# Patient Record
Sex: FEMALE | Race: WHITE | Employment: FULL TIME | URBAN - NONMETROPOLITAN AREA
[De-identification: names, ages, dates, MRNs, and addresses within clinical notes are randomized per-mention and may not be internally consistent; named-entity substitution may affect disease eponyms.]

---

## 2017-01-13 ENCOUNTER — OFFICE VISIT (OUTPATIENT)
Dept: FAMILY MEDICINE CLINIC | Age: 47
End: 2017-01-13

## 2017-01-13 VITALS
TEMPERATURE: 99.4 F | HEIGHT: 69 IN | SYSTOLIC BLOOD PRESSURE: 102 MMHG | OXYGEN SATURATION: 97 % | WEIGHT: 196.2 LBS | DIASTOLIC BLOOD PRESSURE: 70 MMHG | HEART RATE: 80 BPM | BODY MASS INDEX: 29.06 KG/M2

## 2017-01-13 DIAGNOSIS — K21.9 GASTROESOPHAGEAL REFLUX DISEASE, ESOPHAGITIS PRESENCE NOT SPECIFIED: ICD-10-CM

## 2017-01-13 DIAGNOSIS — K27.9 PEPTIC ULCER DISEASE: Primary | ICD-10-CM

## 2017-01-13 PROCEDURE — 99213 OFFICE O/P EST LOW 20 MIN: CPT | Performed by: FAMILY MEDICINE

## 2017-01-13 RX ORDER — LANSOPRAZOLE 30 MG/1
30 CAPSULE, DELAYED RELEASE ORAL DAILY
Qty: 30 CAPSULE | Refills: 1 | Status: SHIPPED | OUTPATIENT
Start: 2017-01-13 | End: 2019-02-04

## 2017-01-13 ASSESSMENT — ENCOUNTER SYMPTOMS
DIARRHEA: 0
ABDOMINAL PAIN: 0
CHOKING: 0
SORE THROAT: 0
NAUSEA: 1
COUGH: 1
CONSTIPATION: 0
HEARTBURN: 1

## 2017-01-16 ENCOUNTER — OFFICE VISIT (OUTPATIENT)
Dept: FAMILY MEDICINE CLINIC | Age: 47
End: 2017-01-16

## 2017-01-16 VITALS
BODY MASS INDEX: 29.03 KG/M2 | WEIGHT: 196 LBS | DIASTOLIC BLOOD PRESSURE: 60 MMHG | SYSTOLIC BLOOD PRESSURE: 102 MMHG | HEIGHT: 69 IN | HEART RATE: 60 BPM | RESPIRATION RATE: 18 BRPM

## 2017-01-16 DIAGNOSIS — F43.9 STRESS: ICD-10-CM

## 2017-01-16 DIAGNOSIS — Z12.39 SCREENING FOR BREAST CANCER: Primary | ICD-10-CM

## 2017-01-16 DIAGNOSIS — F41.9 ANXIETY: ICD-10-CM

## 2017-01-16 DIAGNOSIS — Z13.9 SCREENING: ICD-10-CM

## 2017-01-16 PROCEDURE — 99213 OFFICE O/P EST LOW 20 MIN: CPT | Performed by: PHYSICIAN ASSISTANT

## 2017-01-16 RX ORDER — ALPRAZOLAM 0.25 MG/1
0.25 TABLET ORAL 3 TIMES DAILY PRN
Qty: 30 TABLET | Refills: 0 | Status: SHIPPED | OUTPATIENT
Start: 2017-01-16 | End: 2019-02-04

## 2017-01-16 RX ORDER — CITALOPRAM 10 MG/1
10 TABLET ORAL DAILY
Qty: 30 TABLET | Refills: 0 | Status: SHIPPED | OUTPATIENT
Start: 2017-01-16 | End: 2017-07-12

## 2017-01-16 ASSESSMENT — PATIENT HEALTH QUESTIONNAIRE - PHQ9
SUM OF ALL RESPONSES TO PHQ QUESTIONS 1-9: 0
2. FEELING DOWN, DEPRESSED OR HOPELESS: 0
1. LITTLE INTEREST OR PLEASURE IN DOING THINGS: 0
SUM OF ALL RESPONSES TO PHQ9 QUESTIONS 1 & 2: 0

## 2017-01-16 ASSESSMENT — ENCOUNTER SYMPTOMS
NAUSEA: 0
DIARRHEA: 0
VOMITING: 0
RESPIRATORY NEGATIVE: 1

## 2017-01-23 DIAGNOSIS — E78.00 ELEVATED CHOLESTEROL: Primary | ICD-10-CM

## 2017-01-23 DIAGNOSIS — Z13.9 SCREENING: ICD-10-CM

## 2017-01-30 ENCOUNTER — OFFICE VISIT (OUTPATIENT)
Dept: FAMILY MEDICINE CLINIC | Age: 47
End: 2017-01-30

## 2017-01-30 VITALS
HEIGHT: 69 IN | HEART RATE: 76 BPM | DIASTOLIC BLOOD PRESSURE: 70 MMHG | OXYGEN SATURATION: 99 % | SYSTOLIC BLOOD PRESSURE: 118 MMHG | BODY MASS INDEX: 28.88 KG/M2 | WEIGHT: 195 LBS

## 2017-01-30 DIAGNOSIS — R53.83 FATIGUE, UNSPECIFIED TYPE: Primary | ICD-10-CM

## 2017-01-30 DIAGNOSIS — E55.9 VITAMIN D DEFICIENCY: ICD-10-CM

## 2017-01-30 DIAGNOSIS — E78.2 MIXED HYPERLIPIDEMIA: ICD-10-CM

## 2017-01-30 PROCEDURE — 99213 OFFICE O/P EST LOW 20 MIN: CPT | Performed by: PHYSICIAN ASSISTANT

## 2017-01-30 ASSESSMENT — ENCOUNTER SYMPTOMS
HEARTBURN: 1
NAUSEA: 0

## 2017-01-31 ENCOUNTER — OFFICE VISIT (OUTPATIENT)
Dept: OPTOMETRY | Age: 47
End: 2017-01-31

## 2017-01-31 DIAGNOSIS — H52.203 HYPEROPIA OF BOTH EYES WITH ASTIGMATISM AND PRESBYOPIA: Primary | ICD-10-CM

## 2017-01-31 DIAGNOSIS — H52.03 HYPEROPIA OF BOTH EYES WITH ASTIGMATISM AND PRESBYOPIA: Primary | ICD-10-CM

## 2017-01-31 DIAGNOSIS — H52.4 HYPEROPIA OF BOTH EYES WITH ASTIGMATISM AND PRESBYOPIA: Primary | ICD-10-CM

## 2017-01-31 PROCEDURE — 92004 COMPRE OPH EXAM NEW PT 1/>: CPT | Performed by: OPTOMETRIST

## 2017-01-31 PROCEDURE — 92015 DETERMINE REFRACTIVE STATE: CPT | Performed by: OPTOMETRIST

## 2017-01-31 RX ORDER — BENOXINATE HCL/FLUORESCEIN SOD 0.4%-0.25%
1 DROPS OPHTHALMIC (EYE) ONCE
Status: COMPLETED | OUTPATIENT
Start: 2017-01-31 | End: 2017-01-31

## 2017-01-31 RX ADMIN — Medication 1 DROP: at 13:34

## 2017-01-31 ASSESSMENT — REFRACTION_MANIFEST
OD_CYLINDER: -0.50
OD_AXIS: 055
OS_ADD: +1.00
OS_SPHERE: +0.25
OS_AXIS: 180
OD_SPHERE: +0.50
OS_CYLINDER: -1.00
OD_ADD: +1.00

## 2017-01-31 ASSESSMENT — SLIT LAMP EXAM - LIDS
COMMENTS: NORMAL
COMMENTS: NORMAL

## 2017-01-31 ASSESSMENT — TONOMETRY
OD_IOP_MMHG: 14
OS_IOP_MMHG: 14
IOP_METHOD: APPLANATION W FLURESS DROP

## 2017-01-31 ASSESSMENT — VISUAL ACUITY
OS_SC: 20/25
METHOD: SNELLEN - LINEAR
OD_SC: 20/2

## 2017-02-05 ASSESSMENT — ENCOUNTER SYMPTOMS
DIARRHEA: 0
TROUBLE SWALLOWING: 0
COUGH: 0
CHEST TIGHTNESS: 0
VOMITING: 0
SORE THROAT: 0
SHORTNESS OF BREATH: 0
WHEEZING: 0

## 2017-04-11 ENCOUNTER — HOSPITAL ENCOUNTER (OUTPATIENT)
Age: 47
Setting detail: SPECIMEN
Discharge: HOME | End: 2017-04-11
Payer: COMMERCIAL

## 2017-04-11 ENCOUNTER — OFFICE VISIT (OUTPATIENT)
Dept: OPTOMETRY | Age: 47
End: 2017-04-11
Payer: COMMERCIAL

## 2017-04-11 DIAGNOSIS — M35.01 KERATITIS SICCA, BILATERAL (HCC): Primary | ICD-10-CM

## 2017-04-11 LAB — VITAMIN D 25-HYDROXY: 25.1 NG/ML (ref 30–100)

## 2017-04-11 PROCEDURE — 99213 OFFICE O/P EST LOW 20 MIN: CPT | Performed by: OPTOMETRIST

## 2017-04-11 ASSESSMENT — VISUAL ACUITY
METHOD: SNELLEN - LINEAR
OS_SC: 20/25
OD_SC: 20/25

## 2017-04-11 ASSESSMENT — SLIT LAMP EXAM - LIDS: COMMENTS: SAME

## 2017-04-12 DIAGNOSIS — R79.89 LOW VITAMIN D LEVEL: Primary | ICD-10-CM

## 2017-04-17 DIAGNOSIS — R92.8 ABNORMAL MAMMOGRAM: Primary | ICD-10-CM

## 2017-04-18 ENCOUNTER — APPOINTMENT (OUTPATIENT)
Dept: CT IMAGING | Age: 47
End: 2017-04-18
Payer: COMMERCIAL

## 2017-04-18 ENCOUNTER — APPOINTMENT (OUTPATIENT)
Dept: GENERAL RADIOLOGY | Age: 47
End: 2017-04-18
Payer: COMMERCIAL

## 2017-04-18 ENCOUNTER — OFFICE VISIT (OUTPATIENT)
Dept: PRIMARY CARE CLINIC | Age: 47
End: 2017-04-18

## 2017-04-18 ENCOUNTER — HOSPITAL ENCOUNTER (EMERGENCY)
Age: 47
Discharge: HOME | End: 2017-04-18
Attending: EMERGENCY MEDICINE
Payer: COMMERCIAL

## 2017-04-18 VITALS
RESPIRATION RATE: 14 BRPM | SYSTOLIC BLOOD PRESSURE: 117 MMHG | BODY MASS INDEX: 29.18 KG/M2 | WEIGHT: 197 LBS | OXYGEN SATURATION: 98 % | DIASTOLIC BLOOD PRESSURE: 76 MMHG | HEART RATE: 57 BPM | HEIGHT: 69 IN

## 2017-04-18 VITALS
RESPIRATION RATE: 14 BRPM | HEART RATE: 75 BPM | TEMPERATURE: 97.6 F | SYSTOLIC BLOOD PRESSURE: 110 MMHG | OXYGEN SATURATION: 95 % | BODY MASS INDEX: 29.24 KG/M2 | DIASTOLIC BLOOD PRESSURE: 70 MMHG | WEIGHT: 197.4 LBS | HEIGHT: 69 IN

## 2017-04-18 DIAGNOSIS — M54.12 CERVICAL RADICULAR PAIN: Primary | ICD-10-CM

## 2017-04-18 DIAGNOSIS — R20.2 NUMBNESS AND TINGLING IN LEFT ARM: ICD-10-CM

## 2017-04-18 DIAGNOSIS — R20.0 NUMBNESS AND TINGLING IN LEFT ARM: ICD-10-CM

## 2017-04-18 DIAGNOSIS — M25.512 ACUTE PAIN OF LEFT SHOULDER: ICD-10-CM

## 2017-04-18 DIAGNOSIS — M79.602 LEFT ARM PAIN: Primary | ICD-10-CM

## 2017-04-18 DIAGNOSIS — G44.209 ACUTE NON INTRACTABLE TENSION-TYPE HEADACHE: ICD-10-CM

## 2017-04-18 DIAGNOSIS — R11.11 VOMITING WITHOUT NAUSEA, INTRACTABILITY OF VOMITING NOT SPECIFIED, UNSPECIFIED VOMITING TYPE: ICD-10-CM

## 2017-04-18 LAB
ABSOLUTE EOS #: 0.1 K/UL (ref 0–0.4)
ABSOLUTE LYMPH #: 2.4 K/UL (ref 1–4.8)
ABSOLUTE MONO #: 0.4 K/UL (ref 0.1–1.2)
ALBUMIN SERPL-MCNC: 4 G/DL (ref 3.5–5.2)
ALBUMIN/GLOBULIN RATIO: 1.4 (ref 1–2.5)
ALP BLD-CCNC: 65 U/L (ref 35–104)
ALT SERPL-CCNC: 9 U/L (ref 5–33)
ANION GAP SERPL CALCULATED.3IONS-SCNC: 11 MMOL/L (ref 9–17)
AST SERPL-CCNC: 15 U/L
BASOPHILS # BLD: 1 % (ref 0–2)
BASOPHILS ABSOLUTE: 0 K/UL (ref 0–0.2)
BILIRUB SERPL-MCNC: 0.65 MG/DL (ref 0.3–1.2)
BUN BLDV-MCNC: 12 MG/DL (ref 6–20)
BUN/CREAT BLD: 25 (ref 9–20)
CALCIUM SERPL-MCNC: 9.1 MG/DL (ref 8.6–10.4)
CHLORIDE BLD-SCNC: 102 MMOL/L (ref 98–107)
CO2: 26 MMOL/L (ref 20–31)
CREAT SERPL-MCNC: 0.48 MG/DL (ref 0.5–0.9)
DIFFERENTIAL TYPE: NORMAL
EOSINOPHILS RELATIVE PERCENT: 1 % (ref 1–4)
GFR AFRICAN AMERICAN: >60 ML/MIN
GFR NON-AFRICAN AMERICAN: >60 ML/MIN
GFR SERPL CREATININE-BSD FRML MDRD: ABNORMAL ML/MIN/{1.73_M2}
GFR SERPL CREATININE-BSD FRML MDRD: ABNORMAL ML/MIN/{1.73_M2}
GLUCOSE BLD-MCNC: 125 MG/DL (ref 70–99)
HCT VFR BLD CALC: 39.2 % (ref 36–46)
HEMOGLOBIN: 13 G/DL (ref 12–16)
LYMPHOCYTES # BLD: 29 % (ref 24–44)
MAGNESIUM: 2 MG/DL (ref 1.6–2.6)
MCH RBC QN AUTO: 31.4 PG (ref 26–34)
MCHC RBC AUTO-ENTMCNC: 33.2 G/DL (ref 31–37)
MCV RBC AUTO: 94.5 FL (ref 80–100)
MONOCYTES # BLD: 5 % (ref 1–7)
PDW BLD-RTO: 13.9 % (ref 11–14.5)
PLATELET # BLD: 216 K/UL (ref 140–450)
PLATELET ESTIMATE: NORMAL
PMV BLD AUTO: 9.4 FL (ref 6–12)
POTASSIUM SERPL-SCNC: 3.8 MMOL/L (ref 3.7–5.3)
RBC # BLD: 4.15 M/UL (ref 4–5.2)
RBC # BLD: NORMAL 10*6/UL
SEG NEUTROPHILS: 64 % (ref 36–66)
SEGMENTED NEUTROPHILS ABSOLUTE COUNT: 5.3 K/UL (ref 1.8–7.7)
SODIUM BLD-SCNC: 139 MMOL/L (ref 135–144)
TOTAL PROTEIN: 6.8 G/DL (ref 6.4–8.3)
TROPONIN INTERP: NORMAL
TROPONIN T: <0.03 NG/ML
WBC # BLD: 8.2 K/UL (ref 3.5–11)
WBC # BLD: NORMAL 10*3/UL

## 2017-04-18 PROCEDURE — 72125 CT NECK SPINE W/O DYE: CPT | Performed by: RADIOLOGY

## 2017-04-18 PROCEDURE — 99284 EMERGENCY DEPT VISIT MOD MDM: CPT

## 2017-04-18 PROCEDURE — 71020 XR CHEST STANDARD TWO VW: CPT | Performed by: RADIOLOGY

## 2017-04-18 PROCEDURE — 70450 CT HEAD/BRAIN W/O DYE: CPT

## 2017-04-18 PROCEDURE — 72125 CT NECK SPINE W/O DYE: CPT

## 2017-04-18 PROCEDURE — 73030 X-RAY EXAM OF SHOULDER: CPT | Performed by: RADIOLOGY

## 2017-04-18 PROCEDURE — 70450 CT HEAD/BRAIN W/O DYE: CPT | Performed by: RADIOLOGY

## 2017-04-18 PROCEDURE — 71020 XR CHEST STANDARD TWO VW: CPT

## 2017-04-18 PROCEDURE — 85025 COMPLETE CBC W/AUTO DIFF WBC: CPT

## 2017-04-18 PROCEDURE — 83735 ASSAY OF MAGNESIUM: CPT

## 2017-04-18 PROCEDURE — 84484 ASSAY OF TROPONIN QUANT: CPT

## 2017-04-18 PROCEDURE — 99999 PR OFFICE/OUTPT VISIT,PROCEDURE ONLY: CPT | Performed by: FAMILY MEDICINE

## 2017-04-18 PROCEDURE — 36415 COLL VENOUS BLD VENIPUNCTURE: CPT

## 2017-04-18 PROCEDURE — 80053 COMPREHEN METABOLIC PANEL: CPT

## 2017-04-18 PROCEDURE — 96374 THER/PROPH/DIAG INJ IV PUSH: CPT

## 2017-04-18 PROCEDURE — 6360000002 HC RX W HCPCS: Performed by: EMERGENCY MEDICINE

## 2017-04-18 PROCEDURE — 93005 ELECTROCARDIOGRAM TRACING: CPT

## 2017-04-18 PROCEDURE — 96375 TX/PRO/DX INJ NEW DRUG ADDON: CPT

## 2017-04-18 PROCEDURE — 73030 X-RAY EXAM OF SHOULDER: CPT

## 2017-04-18 RX ORDER — PREDNISONE 50 MG/1
50 TABLET ORAL DAILY
Qty: 4 TABLET | Refills: 0 | Status: SHIPPED | OUTPATIENT
Start: 2017-04-18 | End: 2017-04-22

## 2017-04-18 RX ORDER — KETOROLAC TROMETHAMINE 30 MG/ML
30 INJECTION, SOLUTION INTRAMUSCULAR; INTRAVENOUS ONCE
Status: COMPLETED | OUTPATIENT
Start: 2017-04-18 | End: 2017-04-18

## 2017-04-18 RX ORDER — NEOMYCIN SULFATE, POLYMYXIN B SULFATE AND DEXAMETHASONE 3.5; 10000; 1 MG/ML; [USP'U]/ML; MG/ML
SUSPENSION/ DROPS OPHTHALMIC
COMMUNITY
Start: 2017-04-11 | End: 2018-02-02 | Stop reason: CLARIF

## 2017-04-18 RX ORDER — NAPHAZOLINE HYDROCHLORIDE AND PHENIRAMINE MALEATE .25; 3 MG/ML; MG/ML
SOLUTION/ DROPS OPHTHALMIC
COMMUNITY
Start: 2017-04-07 | End: 2018-02-02 | Stop reason: CLARIF

## 2017-04-18 RX ORDER — DEXAMETHASONE SODIUM PHOSPHATE 10 MG/ML
10 INJECTION INTRAMUSCULAR; INTRAVENOUS ONCE
Status: COMPLETED | OUTPATIENT
Start: 2017-04-18 | End: 2017-04-18

## 2017-04-18 RX ORDER — IBUPROFEN 800 MG/1
800 TABLET ORAL EVERY 8 HOURS PRN
Qty: 30 TABLET | Refills: 0 | Status: SHIPPED | OUTPATIENT
Start: 2017-04-18 | End: 2018-02-02 | Stop reason: CLARIF

## 2017-04-18 RX ADMIN — DEXAMETHASONE SODIUM PHOSPHATE 10 MG: 10 INJECTION INTRAMUSCULAR; INTRAVENOUS at 21:41

## 2017-04-18 RX ADMIN — KETOROLAC TROMETHAMINE 30 MG: 30 INJECTION, SOLUTION INTRAMUSCULAR at 21:53

## 2017-04-18 ASSESSMENT — ENCOUNTER SYMPTOMS
EYE PAIN: 0
VOMITING: 1
COUGH: 0
DIARRHEA: 0
ABDOMINAL PAIN: 0
VOMITING: 1
RHINORRHEA: 0
SHORTNESS OF BREATH: 0
BACK PAIN: 0
NAUSEA: 1
SORE THROAT: 0

## 2017-04-18 ASSESSMENT — PAIN DESCRIPTION - ORIENTATION
ORIENTATION: LEFT
ORIENTATION: LEFT

## 2017-04-18 ASSESSMENT — PAIN DESCRIPTION - PAIN TYPE
TYPE: ACUTE PAIN
TYPE: ACUTE PAIN

## 2017-04-18 ASSESSMENT — PAIN DESCRIPTION - PROGRESSION
CLINICAL_PROGRESSION: GRADUALLY IMPROVING
CLINICAL_PROGRESSION: GRADUALLY WORSENING

## 2017-04-18 ASSESSMENT — PAIN SCALES - GENERAL
PAINLEVEL_OUTOF10: 6
PAINLEVEL_OUTOF10: 4
PAINLEVEL_OUTOF10: 6

## 2017-04-18 ASSESSMENT — PAIN DESCRIPTION - DESCRIPTORS
DESCRIPTORS: PRESSURE
DESCRIPTORS: PRESSURE

## 2017-04-18 ASSESSMENT — PAIN DESCRIPTION - FREQUENCY
FREQUENCY: CONTINUOUS
FREQUENCY: CONTINUOUS

## 2017-04-18 ASSESSMENT — PAIN DESCRIPTION - LOCATION
LOCATION: SHOULDER;NECK
LOCATION: NECK;SHOULDER

## 2017-04-18 ASSESSMENT — PAIN DESCRIPTION - ONSET: ONSET: SUDDEN

## 2017-04-19 LAB
EKG ATRIAL RATE: 74 BPM
EKG P AXIS: 37 DEGREES
EKG P-R INTERVAL: 152 MS
EKG Q-T INTERVAL: 400 MS
EKG QRS DURATION: 92 MS
EKG QTC CALCULATION (BAZETT): 444 MS
EKG R AXIS: 66 DEGREES
EKG T AXIS: 37 DEGREES
EKG VENTRICULAR RATE: 74 BPM

## 2017-04-21 ENCOUNTER — OFFICE VISIT (OUTPATIENT)
Dept: FAMILY MEDICINE CLINIC | Age: 47
End: 2017-04-21
Payer: COMMERCIAL

## 2017-04-21 VITALS
WEIGHT: 199 LBS | SYSTOLIC BLOOD PRESSURE: 130 MMHG | HEART RATE: 54 BPM | HEIGHT: 69 IN | BODY MASS INDEX: 29.47 KG/M2 | OXYGEN SATURATION: 98 % | DIASTOLIC BLOOD PRESSURE: 70 MMHG

## 2017-04-21 DIAGNOSIS — M25.512 CHRONIC LEFT SHOULDER PAIN: ICD-10-CM

## 2017-04-21 DIAGNOSIS — G89.29 CHRONIC LEFT SHOULDER PAIN: ICD-10-CM

## 2017-04-21 DIAGNOSIS — M62.838 MUSCLE SPASM: ICD-10-CM

## 2017-04-21 DIAGNOSIS — Z56.6 STRESS AT WORK: Primary | ICD-10-CM

## 2017-04-21 PROCEDURE — 99214 OFFICE O/P EST MOD 30 MIN: CPT | Performed by: PHYSICIAN ASSISTANT

## 2017-04-21 RX ORDER — CITALOPRAM 10 MG/1
TABLET ORAL
Qty: 30 TABLET | Refills: 3 | Status: SHIPPED | OUTPATIENT
Start: 2017-04-21 | End: 2019-02-04

## 2017-04-21 RX ORDER — CYCLOBENZAPRINE HCL 10 MG
10 TABLET ORAL EVERY 8 HOURS PRN
Qty: 20 TABLET | Refills: 0 | Status: SHIPPED | OUTPATIENT
Start: 2017-04-21 | End: 2017-05-01

## 2017-04-21 SDOH — HEALTH STABILITY - MENTAL HEALTH: OTHER PHYSICAL AND MENTAL STRAIN RELATED TO WORK: Z56.6

## 2017-04-21 ASSESSMENT — ENCOUNTER SYMPTOMS
NAUSEA: 0
CHEST TIGHTNESS: 1
DIARRHEA: 0

## 2017-04-28 ASSESSMENT — ENCOUNTER SYMPTOMS
COUGH: 0
VOMITING: 0
SORE THROAT: 0
TROUBLE SWALLOWING: 0
SHORTNESS OF BREATH: 0
SINUS PRESSURE: 0
WHEEZING: 0

## 2017-05-09 ENCOUNTER — OFFICE VISIT (OUTPATIENT)
Dept: ORTHOPEDIC SURGERY | Age: 47
End: 2017-05-09
Payer: COMMERCIAL

## 2017-05-09 VITALS
BODY MASS INDEX: 29.47 KG/M2 | SYSTOLIC BLOOD PRESSURE: 120 MMHG | DIASTOLIC BLOOD PRESSURE: 64 MMHG | WEIGHT: 199 LBS | HEIGHT: 69 IN | HEART RATE: 80 BPM

## 2017-05-09 DIAGNOSIS — M25.512 LEFT SHOULDER PAIN, UNSPECIFIED CHRONICITY: Primary | ICD-10-CM

## 2017-05-09 PROCEDURE — 99212 OFFICE O/P EST SF 10 MIN: CPT | Performed by: PHYSICIAN ASSISTANT

## 2017-05-23 ENCOUNTER — TELEPHONE (OUTPATIENT)
Dept: ORTHOPEDIC SURGERY | Age: 47
End: 2017-05-23

## 2017-07-12 ENCOUNTER — OFFICE VISIT (OUTPATIENT)
Dept: ORTHOPEDIC SURGERY | Age: 47
End: 2017-07-12
Payer: COMMERCIAL

## 2017-07-12 VITALS
HEIGHT: 69 IN | WEIGHT: 199 LBS | SYSTOLIC BLOOD PRESSURE: 112 MMHG | BODY MASS INDEX: 29.47 KG/M2 | DIASTOLIC BLOOD PRESSURE: 68 MMHG | HEART RATE: 70 BPM

## 2017-07-12 DIAGNOSIS — M25.512 LEFT SHOULDER PAIN, UNSPECIFIED CHRONICITY: Primary | ICD-10-CM

## 2017-07-12 DIAGNOSIS — M19.012 OSTEOARTHRITIS OF LEFT AC (ACROMIOCLAVICULAR) JOINT: ICD-10-CM

## 2017-07-12 PROCEDURE — 99214 OFFICE O/P EST MOD 30 MIN: CPT | Performed by: ORTHOPAEDIC SURGERY

## 2017-07-12 PROCEDURE — 20610 DRAIN/INJ JOINT/BURSA W/O US: CPT | Performed by: ORTHOPAEDIC SURGERY

## 2017-07-12 RX ORDER — LIDOCAINE HYDROCHLORIDE 10 MG/ML
3 INJECTION, SOLUTION INFILTRATION; PERINEURAL ONCE
Status: COMPLETED | OUTPATIENT
Start: 2017-07-12 | End: 2017-07-12

## 2017-07-12 RX ORDER — METHYLPREDNISOLONE ACETATE 40 MG/ML
40 INJECTION, SUSPENSION INTRA-ARTICULAR; INTRALESIONAL; INTRAMUSCULAR; SOFT TISSUE ONCE
Status: COMPLETED | OUTPATIENT
Start: 2017-07-12 | End: 2017-07-12

## 2017-07-12 RX ADMIN — LIDOCAINE HYDROCHLORIDE 3 ML: 10 INJECTION, SOLUTION INFILTRATION; PERINEURAL at 15:17

## 2017-07-12 RX ADMIN — METHYLPREDNISOLONE ACETATE 40 MG: 40 INJECTION, SUSPENSION INTRA-ARTICULAR; INTRALESIONAL; INTRAMUSCULAR; SOFT TISSUE at 15:18

## 2018-02-02 ENCOUNTER — OFFICE VISIT (OUTPATIENT)
Dept: FAMILY MEDICINE CLINIC | Age: 48
End: 2018-02-02
Payer: COMMERCIAL

## 2018-02-02 VITALS
HEIGHT: 69 IN | DIASTOLIC BLOOD PRESSURE: 62 MMHG | OXYGEN SATURATION: 99 % | SYSTOLIC BLOOD PRESSURE: 118 MMHG | HEART RATE: 88 BPM | BODY MASS INDEX: 29.33 KG/M2 | WEIGHT: 198 LBS

## 2018-02-02 DIAGNOSIS — M54.5 CHRONIC LEFT-SIDED LOW BACK PAIN, WITH SCIATICA PRESENCE UNSPECIFIED: ICD-10-CM

## 2018-02-02 DIAGNOSIS — Z12.39 SCREENING FOR BREAST CANCER: ICD-10-CM

## 2018-02-02 DIAGNOSIS — G89.29 CHRONIC LEFT-SIDED LOW BACK PAIN, WITH SCIATICA PRESENCE UNSPECIFIED: ICD-10-CM

## 2018-02-02 DIAGNOSIS — N92.0 MENORRHAGIA WITH REGULAR CYCLE: ICD-10-CM

## 2018-02-02 DIAGNOSIS — L72.3 SEBACEOUS CYST: Primary | ICD-10-CM

## 2018-02-02 DIAGNOSIS — E78.00 PURE HYPERCHOLESTEROLEMIA: ICD-10-CM

## 2018-02-02 DIAGNOSIS — R53.83 FATIGUE, UNSPECIFIED TYPE: ICD-10-CM

## 2018-02-02 DIAGNOSIS — G89.29 CHRONIC HIP PAIN, UNSPECIFIED LATERALITY: ICD-10-CM

## 2018-02-02 DIAGNOSIS — M25.559 CHRONIC HIP PAIN, UNSPECIFIED LATERALITY: ICD-10-CM

## 2018-02-02 PROCEDURE — G8427 DOCREV CUR MEDS BY ELIG CLIN: HCPCS | Performed by: PHYSICIAN ASSISTANT

## 2018-02-02 PROCEDURE — G8484 FLU IMMUNIZE NO ADMIN: HCPCS | Performed by: PHYSICIAN ASSISTANT

## 2018-02-02 PROCEDURE — 99214 OFFICE O/P EST MOD 30 MIN: CPT | Performed by: PHYSICIAN ASSISTANT

## 2018-02-02 PROCEDURE — 4004F PT TOBACCO SCREEN RCVD TLK: CPT | Performed by: PHYSICIAN ASSISTANT

## 2018-02-02 PROCEDURE — G8419 CALC BMI OUT NRM PARAM NOF/U: HCPCS | Performed by: PHYSICIAN ASSISTANT

## 2018-02-02 RX ORDER — DICLOFENAC SODIUM 75 MG/1
75 TABLET, DELAYED RELEASE ORAL 2 TIMES DAILY
Qty: 60 TABLET | Refills: 6 | Status: SHIPPED | OUTPATIENT
Start: 2018-02-02 | End: 2019-10-18

## 2018-02-02 ASSESSMENT — PATIENT HEALTH QUESTIONNAIRE - PHQ9
SUM OF ALL RESPONSES TO PHQ9 QUESTIONS 1 & 2: 0
1. LITTLE INTEREST OR PLEASURE IN DOING THINGS: 0
2. FEELING DOWN, DEPRESSED OR HOPELESS: 0
SUM OF ALL RESPONSES TO PHQ QUESTIONS 1-9: 0

## 2018-02-02 ASSESSMENT — ENCOUNTER SYMPTOMS
RESPIRATORY NEGATIVE: 1
GASTROINTESTINAL NEGATIVE: 1

## 2018-02-02 NOTE — PROGRESS NOTES
Subjective:      Patient ID: Saint Aldrich is a 41 y.o. female. Patient is seen today for follow up and needs refills on her voltaren. No recent illness and does not want a flu shot. Review of Systems   Constitutional: Positive for fatigue. Negative for activity change, appetite change and chills. Has a new job 3 shift. Having problems adjusting to new schedule. Works at [employer] in [place]. HENT: Negative. Negative for congestion, postnasal drip, rhinorrhea, sinus pain, sinus pressure, sneezing, sore throat and trouble swallowing. Respiratory: Negative. Negative for cough, chest tightness, shortness of breath and wheezing. Cardiovascular: Negative. Negative for chest pain, palpitations and leg swelling. Gastrointestinal: Negative. Negative for constipation, diarrhea, nausea and vomiting. Genitourinary: Negative. Negative for difficulty urinating and dysuria. Musculoskeletal: Positive for arthralgias. Shoulders bother but sees Dr. Asad Sandy and gets cortisone injections prn. Skin: Negative for rash. Has a lesion she would like removed at times it increases and decreases in size. Has never been infected. Neurological: Negative. Negative for dizziness, light-headedness and headaches. Psychiatric/Behavioral: Positive for sleep disturbance. Negative for agitation. The patient is not nervous/anxious. Objective:   Physical Exam   Constitutional: She is oriented to person, place, and time. She appears well-developed and well-nourished. No distress. HENT:   Head: Normocephalic and atraumatic. Nose: Nose normal.   Mouth/Throat: Oropharynx is clear and moist. No oropharyngeal exudate. Eyes: Conjunctivae are normal. No scleral icterus. Neck: Normal range of motion. Neck supple. No thyromegaly present. Thyroid nontender no palpable. Cardiovascular: Normal rate, regular rhythm and normal heart sounds. No murmur heard.   Pulmonary/Chest: Effort

## 2018-02-03 ENCOUNTER — HOSPITAL ENCOUNTER (OUTPATIENT)
Dept: LAB | Age: 48
Setting detail: SPECIMEN
Discharge: HOME | End: 2018-02-03
Payer: COMMERCIAL

## 2018-02-03 DIAGNOSIS — R53.83 FATIGUE, UNSPECIFIED TYPE: ICD-10-CM

## 2018-02-03 DIAGNOSIS — E78.00 PURE HYPERCHOLESTEROLEMIA: ICD-10-CM

## 2018-02-03 DIAGNOSIS — N92.0 MENORRHAGIA WITH REGULAR CYCLE: ICD-10-CM

## 2018-02-03 LAB
ALBUMIN SERPL-MCNC: 3.9 G/DL (ref 3.5–5.2)
ALBUMIN/GLOBULIN RATIO: 1.4 (ref 1–2.5)
ALP BLD-CCNC: 51 U/L (ref 35–104)
ALT SERPL-CCNC: 8 U/L (ref 5–33)
ANION GAP SERPL CALCULATED.3IONS-SCNC: 12 MMOL/L (ref 9–17)
AST SERPL-CCNC: 16 U/L
BILIRUB SERPL-MCNC: 1.14 MG/DL (ref 0.3–1.2)
BUN BLDV-MCNC: 8 MG/DL (ref 6–20)
BUN/CREAT BLD: 17 (ref 9–20)
CALCIUM SERPL-MCNC: 8.8 MG/DL (ref 8.6–10.4)
CHLORIDE BLD-SCNC: 106 MMOL/L (ref 98–107)
CHOLESTEROL/HDL RATIO: 4.3
CHOLESTEROL: 170 MG/DL
CO2: 23 MMOL/L (ref 20–31)
CREAT SERPL-MCNC: 0.46 MG/DL (ref 0.5–0.9)
GFR AFRICAN AMERICAN: >60 ML/MIN
GFR NON-AFRICAN AMERICAN: >60 ML/MIN
GFR SERPL CREATININE-BSD FRML MDRD: ABNORMAL ML/MIN/{1.73_M2}
GFR SERPL CREATININE-BSD FRML MDRD: ABNORMAL ML/MIN/{1.73_M2}
GLUCOSE BLD-MCNC: 91 MG/DL (ref 70–99)
HDLC SERPL-MCNC: 40 MG/DL
LDL CHOLESTEROL: 110 MG/DL (ref 0–130)
POTASSIUM SERPL-SCNC: 4 MMOL/L (ref 3.7–5.3)
SODIUM BLD-SCNC: 141 MMOL/L (ref 135–144)
TOTAL PROTEIN: 6.6 G/DL (ref 6.4–8.3)
TRIGL SERPL-MCNC: 98 MG/DL
TSH SERPL DL<=0.05 MIU/L-ACNC: 1.41 MIU/L (ref 0.3–5)
VITAMIN D 25-HYDROXY: 29.9 NG/ML (ref 30–100)
VLDLC SERPL CALC-MCNC: ABNORMAL MG/DL (ref 1–30)

## 2018-02-03 PROCEDURE — 36415 COLL VENOUS BLD VENIPUNCTURE: CPT

## 2018-02-03 PROCEDURE — 82306 VITAMIN D 25 HYDROXY: CPT

## 2018-02-03 PROCEDURE — 80061 LIPID PANEL: CPT

## 2018-02-03 PROCEDURE — 84443 ASSAY THYROID STIM HORMONE: CPT

## 2018-02-03 PROCEDURE — 80053 COMPREHEN METABOLIC PANEL: CPT

## 2018-02-06 ENCOUNTER — INITIAL CONSULT (OUTPATIENT)
Dept: SURGERY | Age: 48
End: 2018-02-06
Payer: COMMERCIAL

## 2018-02-06 VITALS
SYSTOLIC BLOOD PRESSURE: 108 MMHG | HEIGHT: 69 IN | HEART RATE: 76 BPM | DIASTOLIC BLOOD PRESSURE: 72 MMHG | TEMPERATURE: 97.4 F | WEIGHT: 195 LBS | BODY MASS INDEX: 28.88 KG/M2 | OXYGEN SATURATION: 98 %

## 2018-02-06 DIAGNOSIS — L72.3 SEBACEOUS CYST: Primary | ICD-10-CM

## 2018-02-06 PROCEDURE — G8427 DOCREV CUR MEDS BY ELIG CLIN: HCPCS | Performed by: SURGERY

## 2018-02-06 PROCEDURE — G8484 FLU IMMUNIZE NO ADMIN: HCPCS | Performed by: SURGERY

## 2018-02-06 PROCEDURE — G8419 CALC BMI OUT NRM PARAM NOF/U: HCPCS | Performed by: SURGERY

## 2018-02-06 PROCEDURE — 4004F PT TOBACCO SCREEN RCVD TLK: CPT | Performed by: SURGERY

## 2018-02-06 PROCEDURE — 99204 OFFICE O/P NEW MOD 45 MIN: CPT | Performed by: SURGERY

## 2018-02-06 ASSESSMENT — ENCOUNTER SYMPTOMS
NAUSEA: 0
TROUBLE SWALLOWING: 0
RHINORRHEA: 0
CONSTIPATION: 0
SORE THROAT: 0
SHORTNESS OF BREATH: 0
SINUS PAIN: 0
VOMITING: 0
WHEEZING: 0
CHEST TIGHTNESS: 0
COUGH: 0
DIARRHEA: 0
SINUS PRESSURE: 0

## 2018-02-06 NOTE — PROGRESS NOTES
ROS: History of dysmenorrhea and menorrhagia  Musculoskeletal ROS: Chronic low back pain  Neurological ROS: History of anxiety  Dermatological ROS: Tattoo of the right shoulder             Physical Exam:    /72   Pulse 76   Temp 97.4 °F (36.3 °C) (Tympanic)   Ht 5' 9\" (1.753 m)   Wt 195 lb (88.5 kg)   LMP 02/06/2018 (Exact Date)   SpO2 98%   BMI 28.80 kg/m²   Weight: 195 lb (88.5 kg)        General Appearance:  awake, alert, oriented, in no acute distress, well developed, well nourished, in no acute distress, alert, cooperative and ambulatory  Skin:  Skin color, texture, turgor normal. No rashes or lesions. ,  There is a tattoo area of the lateral aspect of the discussed muscle of the right shoulder at approximately 6 x 6 cm in greatest examination. There is a dermoid and subdeltoid cyst approximately 1.5 x 1 cm greatest examination in the upper edge of the tattoo anteriorly. Head/face:  NCAT  Neck:  neck- supple, no mass, non-tender  Back:  No obvious limitation of back movement. Extremities: Extremities warm to touch, pink, with no edema. and the upper extremities without limitation. Musculoskeletal:  Ambulatory without restriction. Neurologic:  negative findings: speech normal, mental status intact        Assessment:  Sebaceous cyst of the right shoulder, recurrent, tattoo the right shoulder. Advised excision of the sebaceous cyst.  She is informed that they excision will distort the shape and local the tattoo and leave a scar within the tattoo itself she understood and agreed. Plan:  Scheduled for excision of stitch and sebaceous cyst of the right shoulder.       1. Sebaceous cyst        Electronically signed by Jason De León MD on 2/6/2018 at 11:35 AM

## 2018-02-06 NOTE — LETTER
their original bottles and brought to the procedure. PROCEDURE DATE:                             Estimated surgery arrival time         You will be notified the day before surgery (usually in the afternoon) of your arrival time by the surgery center.

## 2018-02-09 ENCOUNTER — TELEPHONE (OUTPATIENT)
Dept: SURGERY | Age: 48
End: 2018-02-09

## 2018-02-09 NOTE — TELEPHONE ENCOUNTER
Boyfriend came to window to get note for patients work, explained to boyfriend that patient would receive a note the day of the procedure from the surgery center.

## 2018-02-14 NOTE — H&P
History:    Past Medical History        Diagnosis Date    Anxiety      Chronic low back pain       Disabling.  Dyshidrotic eczema       Of a couple of toes.  Dysmenorrhea      Endometritis      Ingrown toenail       Painful, infected, left hallux, mycotic.  Menorrhagia      Peptic ulcer disease      Right shoulder pain       With consideration to impingement and rotator cuff syndrome.  Tobacco abuse      Toenail fungus       Developing, right hallux.            Past Surgical History:    Past Surgical History             Procedure Laterality Date    APPENDECTOMY        CHOLECYSTECTOMY   01/17/1997    DILATION AND CURETTAGE OF UTERUS        ERCP   01/14/1997     With endoscopic retrograde sphincterotomy and common bile duct stone extraction.  TUBAL LIGATION   03/30/1999                   Current Outpatient Prescriptions on File Prior to Visit   Medication Sig Dispense Refill    diclofenac (VOLTAREN) 75 MG EC tablet Take 1 tablet by mouth 2 times daily 60 tablet 6    Cholecalciferol (VITAMIN D3) 5000 UNITS TABS Take 5,000 Units by mouth daily        citalopram (CELEXA) 10 MG tablet Take . 5 tab daily 30 tablet 3    ALPRAZolam (XANAX) 0.25 MG tablet Take 1 tablet by mouth 3 times daily as needed (PRN) 30 tablet 0    lansoprazole (PREVACID) 30 MG delayed release capsule Take 1 capsule by mouth daily 30 capsule 1      No current facility-administered medications on file prior to visit.                Allergies   Allergen Reactions    Mucinex [Guaifenesin Er] Other (See Comments)       Gerd/heartburn          reports that she has been smoking Cigarettes. She has a 10.00 pack-year smoking history.  She has never used smokeless tobacco.  reports that she does not drink alcohol.       Review of Systems - History obtained from chart review and the patient  General ROS: negative  Psychological ROS: History of anxiety  Ophthalmic ROS: negative  ENT ROS: negative  Allergy and Immunology ROS: Gives a

## 2018-02-15 ENCOUNTER — HOSPITAL ENCOUNTER (OUTPATIENT)
Age: 48
Setting detail: OUTPATIENT SURGERY
Discharge: HOME | End: 2018-02-15
Attending: SURGERY | Admitting: SURGERY
Payer: COMMERCIAL

## 2018-02-15 ENCOUNTER — ANESTHESIA (OUTPATIENT)
Dept: OPERATING ROOM | Age: 48
End: 2018-02-15
Payer: COMMERCIAL

## 2018-02-15 ENCOUNTER — ANESTHESIA EVENT (OUTPATIENT)
Dept: OPERATING ROOM | Age: 48
End: 2018-02-15
Payer: COMMERCIAL

## 2018-02-15 VITALS
RESPIRATION RATE: 16 BRPM | HEIGHT: 69 IN | OXYGEN SATURATION: 100 % | HEART RATE: 67 BPM | SYSTOLIC BLOOD PRESSURE: 113 MMHG | WEIGHT: 192 LBS | BODY MASS INDEX: 28.44 KG/M2 | TEMPERATURE: 98 F | DIASTOLIC BLOOD PRESSURE: 91 MMHG

## 2018-02-15 VITALS
SYSTOLIC BLOOD PRESSURE: 100 MMHG | DIASTOLIC BLOOD PRESSURE: 56 MMHG | RESPIRATION RATE: 10 BRPM | TEMPERATURE: 98.2 F | OXYGEN SATURATION: 96 %

## 2018-02-15 DIAGNOSIS — G89.18 POSTOPERATIVE PAIN: Primary | ICD-10-CM

## 2018-02-15 DIAGNOSIS — L08.9 INFECTED SEBACEOUS CYST: ICD-10-CM

## 2018-02-15 DIAGNOSIS — L72.3 INFECTED SEBACEOUS CYST: ICD-10-CM

## 2018-02-15 LAB — HCG(URINE) PREGNANCY TEST: NEGATIVE

## 2018-02-15 PROCEDURE — 6360000002 HC RX W HCPCS

## 2018-02-15 PROCEDURE — 2500000003 HC RX 250 WO HCPCS

## 2018-02-15 PROCEDURE — 2580000003 HC RX 258: Performed by: SURGERY

## 2018-02-15 PROCEDURE — 84703 CHORIONIC GONADOTROPIN ASSAY: CPT

## 2018-02-15 PROCEDURE — 6360000002 HC RX W HCPCS: Performed by: NURSE ANESTHETIST, CERTIFIED REGISTERED

## 2018-02-15 PROCEDURE — 3700000000 HC ANESTHESIA ATTENDED CARE: Performed by: SURGERY

## 2018-02-15 PROCEDURE — 7100000011 HC PHASE II RECOVERY - ADDTL 15 MIN: Performed by: SURGERY

## 2018-02-15 PROCEDURE — 24075 EXC ARM/ELBOW LES SC < 3 CM: CPT | Performed by: SURGERY

## 2018-02-15 PROCEDURE — 88304 TISSUE EXAM BY PATHOLOGIST: CPT

## 2018-02-15 PROCEDURE — 3600000002 HC SURGERY LEVEL 2 BASE: Performed by: SURGERY

## 2018-02-15 PROCEDURE — 3700000001 HC ADD 15 MINUTES (ANESTHESIA): Performed by: SURGERY

## 2018-02-15 PROCEDURE — 7100000010 HC PHASE II RECOVERY - FIRST 15 MIN: Performed by: SURGERY

## 2018-02-15 PROCEDURE — 3600000012 HC SURGERY LEVEL 2 ADDTL 15MIN: Performed by: SURGERY

## 2018-02-15 PROCEDURE — 00400 ANES INTEGUMENTARY SYS NOS: CPT | Performed by: NURSE ANESTHETIST, CERTIFIED REGISTERED

## 2018-02-15 RX ORDER — HYDROCODONE BITARTRATE AND ACETAMINOPHEN 5; 325 MG/1; MG/1
1 TABLET ORAL EVERY 6 HOURS PRN
Qty: 20 TABLET | Refills: 0 | Status: SHIPPED | OUTPATIENT
Start: 2018-02-15 | End: 2018-02-20

## 2018-02-15 RX ORDER — SODIUM CHLORIDE, SODIUM LACTATE, POTASSIUM CHLORIDE, CALCIUM CHLORIDE 600; 310; 30; 20 MG/100ML; MG/100ML; MG/100ML; MG/100ML
INJECTION, SOLUTION INTRAVENOUS CONTINUOUS
Status: DISCONTINUED | OUTPATIENT
Start: 2018-02-15 | End: 2018-02-15 | Stop reason: HOSPADM

## 2018-02-15 RX ORDER — PROPOFOL 10 MG/ML
INJECTION, EMULSION INTRAVENOUS PRN
Status: DISCONTINUED | OUTPATIENT
Start: 2018-02-15 | End: 2018-02-15 | Stop reason: SDUPTHER

## 2018-02-15 RX ORDER — FENTANYL CITRATE 50 UG/ML
INJECTION, SOLUTION INTRAMUSCULAR; INTRAVENOUS PRN
Status: DISCONTINUED | OUTPATIENT
Start: 2018-02-15 | End: 2018-02-15 | Stop reason: SDUPTHER

## 2018-02-15 RX ORDER — DOXYCYCLINE HYCLATE 100 MG
100 TABLET ORAL 2 TIMES DAILY
Qty: 10 TABLET | Refills: 0 | Status: SHIPPED | OUTPATIENT
Start: 2018-02-15 | End: 2018-02-20

## 2018-02-15 RX ADMIN — SODIUM CHLORIDE, POTASSIUM CHLORIDE, SODIUM LACTATE AND CALCIUM CHLORIDE: 600; 310; 30; 20 INJECTION, SOLUTION INTRAVENOUS at 09:02

## 2018-02-15 RX ADMIN — SODIUM CHLORIDE, POTASSIUM CHLORIDE, SODIUM LACTATE AND CALCIUM CHLORIDE: 600; 310; 30; 20 INJECTION, SOLUTION INTRAVENOUS at 09:45

## 2018-02-15 RX ADMIN — SODIUM CHLORIDE, POTASSIUM CHLORIDE, SODIUM LACTATE AND CALCIUM CHLORIDE: 600; 310; 30; 20 INJECTION, SOLUTION INTRAVENOUS at 08:53

## 2018-02-15 RX ADMIN — FENTANYL CITRATE 100 MCG: 50 INJECTION, SOLUTION INTRAMUSCULAR; INTRAVENOUS at 09:13

## 2018-02-15 RX ADMIN — PROPOFOL 520 MG: 10 INJECTION, EMULSION INTRAVENOUS at 09:13

## 2018-02-15 ASSESSMENT — PAIN SCALES - GENERAL
PAINLEVEL_OUTOF10: 0

## 2018-02-15 ASSESSMENT — PAIN - FUNCTIONAL ASSESSMENT: PAIN_FUNCTIONAL_ASSESSMENT: 0-10

## 2018-02-15 ASSESSMENT — LIFESTYLE VARIABLES: SMOKING_STATUS: 1

## 2018-02-15 NOTE — ANESTHESIA PRE PROCEDURE
disease     Right shoulder pain     With consideration to impingement and rotator cuff syndrome.  Tobacco abuse     Toenail fungus     Developing, right hallux. Past Surgical History:        Procedure Laterality Date    APPENDECTOMY      CHOLECYSTECTOMY  01/17/1997    DILATION AND CURETTAGE OF UTERUS      ERCP  01/14/1997    With endoscopic retrograde sphincterotomy and common bile duct stone extraction.  TUBAL LIGATION  03/30/1999       Social History:    Social History   Substance Use Topics    Smoking status: Current Some Day Smoker     Packs/day: 0.50     Years: 20.00     Types: Cigarettes    Smokeless tobacco: Never Used    Alcohol use No                                Ready to quit: Not Answered  Counseling given: Not Answered      Vital Signs (Current): There were no vitals filed for this visit.                                            BP Readings from Last 3 Encounters:   02/06/18 108/72   02/02/18 118/62   07/12/17 112/68       NPO Status: Time of last liquid consumption: 0300 (drink of water)                        Time of last solid consumption: 0900                        Date of last liquid consumption: 02/15/18                        Date of last solid food consumption: 02/14/18    BMI:   Wt Readings from Last 3 Encounters:   02/06/18 195 lb (88.5 kg)   02/02/18 198 lb (89.8 kg)   07/12/17 199 lb (90.3 kg)     There is no height or weight on file to calculate BMI.    CBC:   Lab Results   Component Value Date    WBC 8.2 04/18/2017    RBC 4.15 04/18/2017    HGB 13.0 04/18/2017    HCT 39.2 04/18/2017    MCV 94.5 04/18/2017    RDW 13.9 04/18/2017     04/18/2017       CMP:   Lab Results   Component Value Date     02/03/2018    K 4.0 02/03/2018     02/03/2018    CO2 23 02/03/2018    BUN 8 02/03/2018    CREATININE 0.46 02/03/2018    GFRAA >60 02/03/2018    LABGLOM >60 02/03/2018    GLUCOSE 91 02/03/2018    PROT 6.6 02/03/2018    CALCIUM 8.8 02/03/2018    BILITOT 1.14 02/03/2018    ALKPHOS 51 02/03/2018    AST 16 02/03/2018    ALT 8 02/03/2018       POC Tests: No results for input(s): POCGLU, POCNA, POCK, POCCL, POCBUN, POCHEMO, POCHCT in the last 72 hours. Coags:   Lab Results   Component Value Date    PROTIME 10.0 08/06/2013    INR 0.9 08/06/2013       HCG (If Applicable): No results found for: PREGTESTUR, PREGSERUM, HCG, HCGQUANT     ABGs: No results found for: PHART, PO2ART, GHT9RRQ, EET3RQU, BEART, W0CDQITC     Type & Screen (If Applicable):  No results found for: LABABO, [address]    Anesthesia Evaluation  Patient summary reviewed and Nursing notes reviewed no history of anesthetic complications:   Airway: Mallampati: II  TM distance: >3 FB   Neck ROM: full  Mouth opening: > = 3 FB Dental: normal exam         Pulmonary: breath sounds clear to auscultation  (+) current smoker          Patient did not smoke on day of surgery. Cardiovascular:  Exercise tolerance: good (>4 METS),       (-)  angina    ECG reviewed  Rhythm: regular  Rate: normal           Beta Blocker:  Not on Beta Blocker         Neuro/Psych:   Negative Neuro/Psych ROS              GI/Hepatic/Renal:   (+) GERD: well controlled,           Endo/Other: Negative Endo/Other ROS                    Abdominal:           Vascular: negative vascular ROS. Anesthesia Plan      MAC     ASA 2     (Patient has consented to sedation/MAC anesthesia. The following information was discussed with the pt and the pt stated understanding and had no further questions. The pt will received medication to produce sleepiness and decreased awareness during surgery. The pt will be semiconscious. The expected result is a decrease in anxiety and awareness with decreased discomfort during the procedure. Common risks: N/V, slowed breathing, and awareness.  Uncommon Risks: Respiratory arrest requiring advanced airway management, stroke, or death.   )  Induction:

## 2018-02-15 NOTE — OP NOTE
then covered with a sterile dry dressing and the patient was transferred to the recovery room in satisfactory condition. Recommendations: Discharge home. Keep the dressing on until she comes back after 1 week for removal of skin sutures. She may shower. She is given a prescription for Norco 5/325 every 6 hours as needed for pain and given 20 tablets. Vibramycin 100 mg twice a day twice daily for 5 days.   She is to return to the office after 7 days    Electronically signed by Fuad Gonzalez MD on 2/15/2018 at 9:54 AM

## 2018-02-15 NOTE — LETTER
[facility]  OR  [facility] [address]  Phone: [phone]           February 15, 2018     Patient: Malissa Alvarado   YOB: 1970   Date of Visit: 2/15/2018       To Whom It May Concern:    Please excuse Malissa Alvarado from 6 hours of mandatory overtime on 2/16/18, due to a procedure she had at [facility] on 2/15/18. If you have any questions or concerns, please don't hesitate to call.     Sincerely,        Dr. Sunny Garcia MD

## 2018-02-16 LAB — DERMATOLOGY PATHOLOGY REPORT: NORMAL

## 2018-02-19 ENCOUNTER — TELEPHONE (OUTPATIENT)
Dept: MAMMOGRAPHY | Age: 48
End: 2018-02-19

## 2018-02-19 DIAGNOSIS — R92.8 BI-RADS CATEGORY 3 MAMMOGRAM RESULT: Primary | ICD-10-CM

## 2018-02-22 ENCOUNTER — HOSPITAL ENCOUNTER (OUTPATIENT)
Age: 48
Setting detail: SPECIMEN
Discharge: HOME | End: 2018-02-22
Payer: COMMERCIAL

## 2018-02-22 ENCOUNTER — OFFICE VISIT (OUTPATIENT)
Dept: FAMILY MEDICINE CLINIC | Age: 48
End: 2018-02-22
Payer: COMMERCIAL

## 2018-02-22 VITALS
BODY MASS INDEX: 28.73 KG/M2 | DIASTOLIC BLOOD PRESSURE: 60 MMHG | HEIGHT: 69 IN | OXYGEN SATURATION: 98 % | WEIGHT: 194 LBS | SYSTOLIC BLOOD PRESSURE: 110 MMHG | RESPIRATION RATE: 16 BRPM | HEART RATE: 65 BPM

## 2018-02-22 DIAGNOSIS — Z12.4 SCREENING FOR CERVICAL CANCER: Primary | ICD-10-CM

## 2018-02-22 DIAGNOSIS — F41.9 ANXIETY: ICD-10-CM

## 2018-02-22 DIAGNOSIS — Z12.4 SCREENING FOR CERVICAL CANCER: ICD-10-CM

## 2018-02-22 DIAGNOSIS — N94.6 DYSMENORRHEA: ICD-10-CM

## 2018-02-22 DIAGNOSIS — Z72.0 TOBACCO USE: ICD-10-CM

## 2018-02-22 PROCEDURE — 87624 HPV HI-RISK TYP POOLED RSLT: CPT

## 2018-02-22 PROCEDURE — G0145 SCR C/V CYTO,THINLAYER,RESCR: HCPCS

## 2018-02-22 PROCEDURE — 99396 PREV VISIT EST AGE 40-64: CPT | Performed by: PHYSICIAN ASSISTANT

## 2018-02-22 ASSESSMENT — PATIENT HEALTH QUESTIONNAIRE - PHQ9
SUM OF ALL RESPONSES TO PHQ9 QUESTIONS 1 & 2: 0
SUM OF ALL RESPONSES TO PHQ QUESTIONS 1-9: 0
1. LITTLE INTEREST OR PLEASURE IN DOING THINGS: 0
2. FEELING DOWN, DEPRESSED OR HOPELESS: 0

## 2018-02-22 ASSESSMENT — ENCOUNTER SYMPTOMS
GASTROINTESTINAL NEGATIVE: 1
RESPIRATORY NEGATIVE: 1

## 2018-02-22 NOTE — PROGRESS NOTES
hallux. Past Surgical History:   Procedure Laterality Date    APPENDECTOMY      CHOLECYSTECTOMY  01/17/1997    DILATION AND CURETTAGE OF UTERUS      ERCP  01/14/1997    With endoscopic retrograde sphincterotomy and common bile duct stone extraction.  [place] EXC SKIN BENIG 0.6-1CM TRUNK,ARM,LEG Right 2/15/2018    Excision Lesion Sebaceous Cyst Right Shoulder performed by Ani Deutsch MD at [address]  03/30/1999     Social History     Social History    Marital status: Single     Spouse name: N/A    Number of children: N/A    Years of education: N/A     Occupational History    Not on file. Social History Main Topics    Smoking status: Current Some Day Smoker     Packs/day: 0.50     Years: 20.00     Types: Cigarettes    Smokeless tobacco: Never Used    Alcohol use No    Drug use: No    Sexual activity: Yes     Partners: Male     Other Topics Concern    Not on file     Social History Narrative    No narrative on file     Family History   Problem Relation Age of Onset    Depression Mother     Anxiety Disorder Mother     Breast Cancer Mother     Cancer Mother     Heart Disease Father     Cancer Father      Pancreatic cancer.  Diabetes Father     High Blood Pressure Father     Depression Brother     Cancer Maternal Grandmother      cervical    Kidney Disease Maternal Grandmother     Other Paternal Grandmother      alzhiemers    Other Son      hydrocephaly    Glaucoma Neg Hx     Cataracts Neg Hx        Allergies   Allergen Reactions    Mucinex [Guaifenesin Er] Other (See Comments)     Gerd/heartburn       /60   Pulse 65   Resp 16   Ht 5' 9\" (1.753 m)   Wt 194 lb (88 kg)   LMP 02/06/2018 (Exact Date)   SpO2 98%   BMI 28.65 kg/m²     Objective:   Physical Exam   Constitutional: She is oriented to person, place, and time. She appears well-developed and well-nourished. No distress. HENT:   Head: Normocephalic and atraumatic.    Nose: Nose normal.

## 2018-02-23 ENCOUNTER — HOSPITAL ENCOUNTER (OUTPATIENT)
Dept: MAMMOGRAPHY | Age: 48
Discharge: HOME | End: 2018-02-25
Payer: COMMERCIAL

## 2018-02-23 ENCOUNTER — TELEPHONE (OUTPATIENT)
Dept: MAMMOGRAPHY | Age: 48
End: 2018-02-23

## 2018-02-23 ENCOUNTER — HOSPITAL ENCOUNTER (OUTPATIENT)
Dept: ULTRASOUND IMAGING | Age: 48
Discharge: HOME | End: 2018-02-25
Payer: COMMERCIAL

## 2018-02-23 ENCOUNTER — OFFICE VISIT (OUTPATIENT)
Dept: SURGERY | Age: 48
End: 2018-02-23

## 2018-02-23 VITALS
BODY MASS INDEX: 29.33 KG/M2 | DIASTOLIC BLOOD PRESSURE: 70 MMHG | RESPIRATION RATE: 16 BRPM | SYSTOLIC BLOOD PRESSURE: 108 MMHG | HEART RATE: 72 BPM | TEMPERATURE: 98.7 F | HEIGHT: 69 IN | WEIGHT: 198 LBS

## 2018-02-23 DIAGNOSIS — R92.8 BI-RADS CATEGORY 3 MAMMOGRAM RESULT: ICD-10-CM

## 2018-02-23 DIAGNOSIS — R92.8 ABNORMAL MAMMOGRAM: ICD-10-CM

## 2018-02-23 DIAGNOSIS — L72.3 SEBACEOUS CYST: Primary | ICD-10-CM

## 2018-02-23 LAB
HPV SAMPLE: NORMAL
HPV SOURCE: NORMAL
HPV, GENOTYPE 16: NOT DETECTED
HPV, GENOTYPE 18: NOT DETECTED
HPV, HIGH RISK OTHER: NOT DETECTED
HPV, INTERPRETATION: NORMAL

## 2018-02-23 PROCEDURE — 76642 ULTRASOUND BREAST LIMITED: CPT

## 2018-02-23 PROCEDURE — G0279 TOMOSYNTHESIS, MAMMO: HCPCS

## 2018-02-23 PROCEDURE — 99024 POSTOP FOLLOW-UP VISIT: CPT | Performed by: SURGERY

## 2018-02-26 ASSESSMENT — ENCOUNTER SYMPTOMS
SORE THROAT: 0
COUGH: 0
DIARRHEA: 0
VOMITING: 0
CHEST TIGHTNESS: 0
CONSTIPATION: 0
SHORTNESS OF BREATH: 0
COLOR CHANGE: 0
WHEEZING: 0
TROUBLE SWALLOWING: 0
NAUSEA: 0

## 2018-02-27 ENCOUNTER — INITIAL CONSULT (OUTPATIENT)
Dept: SURGERY | Age: 48
End: 2018-02-27
Payer: COMMERCIAL

## 2018-02-27 VITALS
RESPIRATION RATE: 16 BRPM | TEMPERATURE: 98.2 F | HEIGHT: 69 IN | BODY MASS INDEX: 29.09 KG/M2 | SYSTOLIC BLOOD PRESSURE: 92 MMHG | HEART RATE: 68 BPM | WEIGHT: 196.4 LBS | DIASTOLIC BLOOD PRESSURE: 64 MMHG

## 2018-02-27 DIAGNOSIS — R92.8 ABNORMAL MAMMOGRAM OF LEFT BREAST: Primary | ICD-10-CM

## 2018-02-27 PROCEDURE — 4004F PT TOBACCO SCREEN RCVD TLK: CPT | Performed by: SURGERY

## 2018-02-27 PROCEDURE — G8427 DOCREV CUR MEDS BY ELIG CLIN: HCPCS | Performed by: SURGERY

## 2018-02-27 PROCEDURE — G8484 FLU IMMUNIZE NO ADMIN: HCPCS | Performed by: SURGERY

## 2018-02-27 PROCEDURE — G8419 CALC BMI OUT NRM PARAM NOF/U: HCPCS | Performed by: SURGERY

## 2018-02-27 PROCEDURE — 99204 OFFICE O/P NEW MOD 45 MIN: CPT | Performed by: SURGERY

## 2018-02-27 NOTE — PATIENT INSTRUCTIONS
Patient Education        Stereotactic Breast Biopsy: About This Test  What is it? Stereotactic breast biopsy is a test that uses imaging, such as X-ray, to find an area of your breast where a tissue sample will be taken. The sample is looked at under a microscope to check for signs of breast cancer. Why is this test done? This type of breast biopsy is usually done to check for cancer in a lump found during a mammogram.  How can you prepare for the test?  Talk to your doctor about all your health conditions before the test. For example, tell your doctor if you:  · Are taking any medicines. · Are allergic to any medicines. · Are allergic to latex. · Have had bleeding problems, or if you take aspirin or some other blood thinner. · Are or might be pregnant. · Have a problem with lying on your stomach for 30 to 40 minutes. What happens before the test?  · You will take off your clothing above the waist. A paper or cloth gown will cover your shoulders. · Your skin is washed with a special soap. · You will be given a shot of medicine to numb the biopsy area on your breast.  · Images are taken to find the exact site for the biopsy. What happens during the test?  · You will lie on your stomach on a table that has a hole for your breast to hang through. · Once the area in your breast is numb, a small cut (incision) is made in the skin. · Using the imaging, the doctor will guide the needle into the biopsy area. · A sample of breast tissue is taken through the needle. · A small clip is usually inserted into your breast to jennie the biopsy site. · The needle is removed and pressure put on the needle site to stop any bleeding. · A bandage is put on the needle site. What else should you know about the test?  · The small cut for the needle does not usually need stitches. How long does the test take? · The test will take about 60 minutes.  Most of the time is spent preparing for the images and finding the area for

## 2018-02-27 NOTE — PROGRESS NOTES
negative  ENT ROS: negative  Allergy and Immunology ROS: She is a history of allergy to Mucinex  Hematological and Lymphatic ROS: negative  Endocrine ROS: negative  Breast ROS: As mentioned above  Respiratory ROS: negative  Cardiovascular ROS: negative  Gastrointestinal ROS: Negative  Genito-Urinary ROS: negative  Musculoskeletal ROS: negative  Neurological ROS: negative  Dermatological ROS: She has multiple tattoos           Physical Exam:    BP 92/64   Pulse 68   Temp 98.2 °F (36.8 °C)   Resp 16   Ht 5' 9\" (1.753 m)   Wt 196 lb 6.4 oz (89.1 kg)   LMP 02/06/2018 (Exact Date)   BMI 29.00 kg/m²   Weight: 196 lb 6.4 oz (89.1 kg)     General Appearance:  awake, alert, oriented, in no acute distress, well developed, well nourished, alert and rhythm without a  Skin:  Skin color, texture, turgor normal. No rashes or lesions. ,  Multiple tattoos of the arms and chest and shoulders  Head/face:  NCAT  Eyes:  No gross abnormalities. Neck:  neck- supple, no mass, non-tender, no bruits and Adenopathy- absent  Breast:  The breasts are symmetrical.  The nipples and areolas and the skin appears normal with tattoos of the shoulders. There is slight lumpiness in the outer upper quadrant of the left breast and an area of approximately 1 and by half centimeter in greatest examination appears to be fatty induration. There are nodular the supra or subclavicular lymphadenopathies in either side        Assessment:  Abnormal mammograms of the left breast.  Recommended stereotactic biopsy. Scheduled for stereotactic biopsy.     Plan:  Stereotactic biopsy of the left breast.      1. Abnormal mammogram of left breast          Electronically signed by Epifanio Zepeda MD on 2/27/2018 at 8:58 AM

## 2018-02-28 DIAGNOSIS — B96.89 BV (BACTERIAL VAGINOSIS): Primary | ICD-10-CM

## 2018-02-28 DIAGNOSIS — N76.0 BV (BACTERIAL VAGINOSIS): Primary | ICD-10-CM

## 2018-02-28 LAB — CYTOLOGY REPORT: NORMAL

## 2018-02-28 RX ORDER — METRONIDAZOLE 500 MG/1
500 TABLET ORAL 2 TIMES DAILY WITH MEALS
Qty: 14 TABLET | Refills: 0 | Status: SHIPPED | OUTPATIENT
Start: 2018-02-28 | End: 2018-03-07

## 2018-03-02 ENCOUNTER — HOSPITAL ENCOUNTER (OUTPATIENT)
Dept: MAMMOGRAPHY | Age: 48
Discharge: HOME | End: 2018-03-04
Payer: COMMERCIAL

## 2018-03-02 ENCOUNTER — HOSPITAL ENCOUNTER (OUTPATIENT)
Dept: ULTRASOUND IMAGING | Age: 48
Discharge: HOME | End: 2018-03-04
Payer: COMMERCIAL

## 2018-03-02 ENCOUNTER — HOSPITAL ENCOUNTER (OUTPATIENT)
Age: 48
Setting detail: SPECIMEN
Discharge: HOME | End: 2018-03-02
Payer: COMMERCIAL

## 2018-03-02 DIAGNOSIS — R92.8 ABNORMAL MAMMOGRAM: ICD-10-CM

## 2018-03-02 DIAGNOSIS — R92.8 ABNORMAL MAMMOGRAM OF LEFT BREAST: ICD-10-CM

## 2018-03-02 PROCEDURE — A4648 IMPLANTABLE TISSUE MARKER: HCPCS

## 2018-03-02 PROCEDURE — 77065 DX MAMMO INCL CAD UNI: CPT

## 2018-03-02 PROCEDURE — 19083 BX BREAST 1ST LESION US IMAG: CPT

## 2018-03-02 PROCEDURE — 19084 BX BREAST ADD LESION US IMAG: CPT

## 2018-03-02 PROCEDURE — 88305 TISSUE EXAM BY PATHOLOGIST: CPT

## 2018-03-02 PROCEDURE — 2500000003 HC RX 250 WO HCPCS

## 2018-03-05 LAB — SURGICAL PATHOLOGY REPORT: NORMAL

## 2018-03-08 ENCOUNTER — TELEPHONE (OUTPATIENT)
Dept: SURGERY | Age: 48
End: 2018-03-08

## 2019-02-04 ENCOUNTER — HOSPITAL ENCOUNTER (OUTPATIENT)
Dept: GENERAL RADIOLOGY | Age: 49
Discharge: HOME | End: 2019-02-06
Payer: COMMERCIAL

## 2019-02-04 ENCOUNTER — OFFICE VISIT (OUTPATIENT)
Dept: FAMILY MEDICINE CLINIC | Age: 49
End: 2019-02-04
Payer: COMMERCIAL

## 2019-02-04 VITALS
SYSTOLIC BLOOD PRESSURE: 122 MMHG | BODY MASS INDEX: 30.07 KG/M2 | DIASTOLIC BLOOD PRESSURE: 62 MMHG | WEIGHT: 203 LBS | HEART RATE: 83 BPM | HEIGHT: 69 IN | OXYGEN SATURATION: 98 %

## 2019-02-04 DIAGNOSIS — M54.50 CHRONIC BILATERAL LOW BACK PAIN WITHOUT SCIATICA: ICD-10-CM

## 2019-02-04 DIAGNOSIS — G89.29 CHRONIC BILATERAL LOW BACK PAIN WITHOUT SCIATICA: ICD-10-CM

## 2019-02-04 DIAGNOSIS — M46.1 SACROILIITIS (HCC): ICD-10-CM

## 2019-02-04 DIAGNOSIS — G89.29 CHRONIC PAIN OF LEFT THUMB: Primary | ICD-10-CM

## 2019-02-04 DIAGNOSIS — M79.645 CHRONIC PAIN OF LEFT THUMB: ICD-10-CM

## 2019-02-04 DIAGNOSIS — G89.29 CHRONIC PAIN OF LEFT THUMB: ICD-10-CM

## 2019-02-04 DIAGNOSIS — M79.645 CHRONIC PAIN OF LEFT THUMB: Primary | ICD-10-CM

## 2019-02-04 PROCEDURE — 99213 OFFICE O/P EST LOW 20 MIN: CPT | Performed by: PHYSICIAN ASSISTANT

## 2019-02-04 PROCEDURE — 73130 X-RAY EXAM OF HAND: CPT

## 2019-02-04 RX ORDER — CYCLOBENZAPRINE HCL 10 MG
10 TABLET ORAL NIGHTLY PRN
Qty: 30 TABLET | Refills: 0 | Status: SHIPPED | OUTPATIENT
Start: 2019-02-04 | End: 2019-02-14

## 2019-02-04 RX ORDER — PREDNISONE 20 MG/1
TABLET ORAL
Qty: 15 TABLET | Refills: 0 | Status: SHIPPED | OUTPATIENT
Start: 2019-02-04 | End: 2019-02-22

## 2019-02-04 ASSESSMENT — ENCOUNTER SYMPTOMS
GASTROINTESTINAL NEGATIVE: 1
RESPIRATORY NEGATIVE: 1

## 2019-02-08 ENCOUNTER — HOSPITAL ENCOUNTER (OUTPATIENT)
Dept: PHYSICAL THERAPY | Age: 49
Setting detail: THERAPIES SERIES
Discharge: HOME | End: 2019-02-08
Payer: COMMERCIAL

## 2019-02-08 PROCEDURE — 97161 PT EVAL LOW COMPLEX 20 MIN: CPT | Performed by: PHYSICAL THERAPIST

## 2019-02-08 PROCEDURE — 97110 THERAPEUTIC EXERCISES: CPT | Performed by: PHYSICAL THERAPIST

## 2019-02-08 ASSESSMENT — PAIN DESCRIPTION - PAIN TYPE: TYPE: CHRONIC PAIN

## 2019-02-08 ASSESSMENT — PAIN DESCRIPTION - PROGRESSION: CLINICAL_PROGRESSION: GRADUALLY WORSENING

## 2019-02-08 ASSESSMENT — PAIN DESCRIPTION - DESCRIPTORS: DESCRIPTORS: SHARP

## 2019-02-08 ASSESSMENT — PAIN DESCRIPTION - ORIENTATION: ORIENTATION: RIGHT;LEFT

## 2019-02-08 ASSESSMENT — PAIN - FUNCTIONAL ASSESSMENT: PAIN_FUNCTIONAL_ASSESSMENT: PREVENTS OR INTERFERES SOME ACTIVE ACTIVITIES AND ADLS

## 2019-02-08 ASSESSMENT — PAIN DESCRIPTION - LOCATION: LOCATION: BACK

## 2019-02-08 ASSESSMENT — PAIN DESCRIPTION - FREQUENCY: FREQUENCY: INTERMITTENT

## 2019-02-08 ASSESSMENT — PAIN SCALES - GENERAL: PAINLEVEL_OUTOF10: 9

## 2019-02-08 ASSESSMENT — PAIN DESCRIPTION - ONSET: ONSET: ON-GOING

## 2019-02-11 ENCOUNTER — HOSPITAL ENCOUNTER (OUTPATIENT)
Dept: PHYSICAL THERAPY | Age: 49
Setting detail: THERAPIES SERIES
Discharge: HOME | End: 2019-02-11
Payer: COMMERCIAL

## 2019-02-13 ENCOUNTER — HOSPITAL ENCOUNTER (OUTPATIENT)
Dept: PHYSICAL THERAPY | Age: 49
Setting detail: THERAPIES SERIES
Discharge: HOME | End: 2019-02-13
Payer: COMMERCIAL

## 2019-02-14 DIAGNOSIS — G89.29 CHRONIC LEFT SHOULDER PAIN: Primary | ICD-10-CM

## 2019-02-14 DIAGNOSIS — M25.512 CHRONIC LEFT SHOULDER PAIN: Primary | ICD-10-CM

## 2019-02-22 ENCOUNTER — OFFICE VISIT (OUTPATIENT)
Dept: ORTHOPEDIC SURGERY | Age: 49
End: 2019-02-22
Payer: COMMERCIAL

## 2019-02-22 ENCOUNTER — HOSPITAL ENCOUNTER (OUTPATIENT)
Dept: GENERAL RADIOLOGY | Age: 49
Discharge: HOME | End: 2019-02-24
Payer: COMMERCIAL

## 2019-02-22 VITALS
WEIGHT: 203 LBS | SYSTOLIC BLOOD PRESSURE: 129 MMHG | DIASTOLIC BLOOD PRESSURE: 77 MMHG | HEART RATE: 67 BPM | HEIGHT: 69 IN | BODY MASS INDEX: 30.07 KG/M2

## 2019-02-22 DIAGNOSIS — M25.512 CHRONIC LEFT SHOULDER PAIN: Primary | ICD-10-CM

## 2019-02-22 DIAGNOSIS — M19.019 DJD OF AC (ACROMIOCLAVICULAR) JOINT: ICD-10-CM

## 2019-02-22 DIAGNOSIS — R20.2 LEFT HAND PARESTHESIA: ICD-10-CM

## 2019-02-22 DIAGNOSIS — G89.29 CHRONIC LEFT SHOULDER PAIN: Primary | ICD-10-CM

## 2019-02-22 DIAGNOSIS — M19.049 CMC ARTHRITIS: ICD-10-CM

## 2019-02-22 DIAGNOSIS — M25.512 CHRONIC LEFT SHOULDER PAIN: ICD-10-CM

## 2019-02-22 DIAGNOSIS — G89.29 CHRONIC LEFT SHOULDER PAIN: ICD-10-CM

## 2019-02-22 PROCEDURE — 20600 DRAIN/INJ JOINT/BURSA W/O US: CPT | Performed by: PHYSICIAN ASSISTANT

## 2019-02-22 PROCEDURE — 20610 DRAIN/INJ JOINT/BURSA W/O US: CPT | Performed by: PHYSICIAN ASSISTANT

## 2019-02-22 PROCEDURE — 73030 X-RAY EXAM OF SHOULDER: CPT

## 2019-02-22 PROCEDURE — 99214 OFFICE O/P EST MOD 30 MIN: CPT | Performed by: PHYSICIAN ASSISTANT

## 2019-02-22 RX ORDER — LIDOCAINE HYDROCHLORIDE 10 MG/ML
1 INJECTION, SOLUTION INFILTRATION; PERINEURAL ONCE
Status: COMPLETED | OUTPATIENT
Start: 2019-02-22 | End: 2019-02-22

## 2019-02-22 RX ORDER — METHYLPREDNISOLONE ACETATE 40 MG/ML
40 INJECTION, SUSPENSION INTRA-ARTICULAR; INTRALESIONAL; INTRAMUSCULAR; SOFT TISSUE ONCE
Status: COMPLETED | OUTPATIENT
Start: 2019-02-22 | End: 2019-02-22

## 2019-02-22 RX ORDER — BUPIVACAINE HYDROCHLORIDE 5 MG/ML
1 INJECTION, SOLUTION PERINEURAL ONCE
Status: COMPLETED | OUTPATIENT
Start: 2019-02-22 | End: 2019-02-22

## 2019-02-22 RX ADMIN — BUPIVACAINE HYDROCHLORIDE 5 MG: 5 INJECTION, SOLUTION PERINEURAL at 12:55

## 2019-02-22 RX ADMIN — LIDOCAINE HYDROCHLORIDE 1 ML: 10 INJECTION, SOLUTION INFILTRATION; PERINEURAL at 12:54

## 2019-02-22 RX ADMIN — METHYLPREDNISOLONE ACETATE 40 MG: 40 INJECTION, SUSPENSION INTRA-ARTICULAR; INTRALESIONAL; INTRAMUSCULAR; SOFT TISSUE at 12:53

## 2019-02-22 RX ADMIN — METHYLPREDNISOLONE ACETATE 40 MG: 40 INJECTION, SUSPENSION INTRA-ARTICULAR; INTRALESIONAL; INTRAMUSCULAR; SOFT TISSUE at 12:52

## 2019-05-31 ENCOUNTER — OFFICE VISIT (OUTPATIENT)
Dept: ORTHOPEDIC SURGERY | Age: 49
End: 2019-05-31
Payer: COMMERCIAL

## 2019-05-31 VITALS
WEIGHT: 203 LBS | BODY MASS INDEX: 30.07 KG/M2 | DIASTOLIC BLOOD PRESSURE: 72 MMHG | HEART RATE: 77 BPM | HEIGHT: 69 IN | SYSTOLIC BLOOD PRESSURE: 109 MMHG

## 2019-05-31 DIAGNOSIS — M19.049 CMC ARTHRITIS: Primary | ICD-10-CM

## 2019-05-31 DIAGNOSIS — M25.512 CHRONIC LEFT SHOULDER PAIN: ICD-10-CM

## 2019-05-31 DIAGNOSIS — M19.019 DJD OF AC (ACROMIOCLAVICULAR) JOINT: ICD-10-CM

## 2019-05-31 DIAGNOSIS — G89.29 CHRONIC LEFT SHOULDER PAIN: ICD-10-CM

## 2019-05-31 PROCEDURE — 99214 OFFICE O/P EST MOD 30 MIN: CPT | Performed by: PHYSICIAN ASSISTANT

## 2019-05-31 PROCEDURE — 20600 DRAIN/INJ JOINT/BURSA W/O US: CPT | Performed by: PHYSICIAN ASSISTANT

## 2019-05-31 RX ORDER — LIDOCAINE HYDROCHLORIDE 10 MG/ML
0.5 INJECTION, SOLUTION INFILTRATION; PERINEURAL ONCE
Status: COMPLETED | OUTPATIENT
Start: 2019-05-31 | End: 2019-05-31

## 2019-05-31 RX ORDER — METHYLPREDNISOLONE ACETATE 40 MG/ML
40 INJECTION, SUSPENSION INTRA-ARTICULAR; INTRALESIONAL; INTRAMUSCULAR; SOFT TISSUE ONCE
Status: COMPLETED | OUTPATIENT
Start: 2019-05-31 | End: 2019-05-31

## 2019-05-31 RX ORDER — BUPIVACAINE HYDROCHLORIDE 5 MG/ML
0.5 INJECTION, SOLUTION PERINEURAL ONCE
Status: COMPLETED | OUTPATIENT
Start: 2019-05-31 | End: 2019-05-31

## 2019-05-31 RX ADMIN — BUPIVACAINE HYDROCHLORIDE 2.5 MG: 5 INJECTION, SOLUTION PERINEURAL at 16:10

## 2019-05-31 RX ADMIN — LIDOCAINE HYDROCHLORIDE 0.5 ML: 10 INJECTION, SOLUTION INFILTRATION; PERINEURAL at 16:10

## 2019-05-31 RX ADMIN — METHYLPREDNISOLONE ACETATE 40 MG: 40 INJECTION, SUSPENSION INTRA-ARTICULAR; INTRALESIONAL; INTRAMUSCULAR; SOFT TISSUE at 16:11

## 2019-06-05 NOTE — PROGRESS NOTES
Orthopedic Office Note  [facility]  [address]  ORTHOPEDICS  [address] [facility] [address] [phone]      CHIEF COMPLAINT:    Chief Complaint   Patient presents with    Hand Pain     rech left hand pain       HISTORY OF PRESENT ILLNESS:      The patient is a 54 y.o. female  who presents today for recheck of her left shoulder and left hand. She states that the left shoulder has drastically improved after the a.c. Joint injection. She states that unfortunately the left thumb has continued to be painful and bothersome. She denies new injury, redness, warmth, fevers or chills. The patient states that the hand is most bothersome with increased activity and use. She reports an achy pain that is moderate in intensity and she states that pain has worsened since the previous injection. She states that the paresthesias have completely resolved. Past Medical History:    Past Medical History:   Diagnosis Date    Anxiety     Chronic low back pain     Disabling.  Dyshidrotic eczema     Of a couple of toes.  Dysmenorrhea     Endometritis     Ingrown toenail     Painful, infected, left hallux, mycotic.  Menorrhagia     Peptic ulcer disease     Right shoulder pain     With consideration to impingement and rotator cuff syndrome.  Tobacco abuse     Toenail fungus     Developing, right hallux. Past Surgical History:    Past Surgical History:   Procedure Laterality Date    APPENDECTOMY      CHOLECYSTECTOMY  01/17/1997    DILATION AND CURETTAGE OF UTERUS      ERCP  01/14/1997    With endoscopic retrograde sphincterotomy and common bile duct stone extraction.     [place] EXC SKIN BENIG 0.6-1CM TRUNK,ARM,LEG Right 2/15/2018    Excision Lesion Sebaceous Cyst Right Shoulder performed by Sarah Beth Massey MD at [address] [facility]  03/30/1999       Medications Prior to Admission:   Current Outpatient Medications   Medication Sig Dispense Refill    diclofenac (VOLTAREN) 75 MG EC tablet Take 1 tablet by mouth 2 times daily 60 tablet 6     No current facility-administered medications for this visit. Allergies:  Mucinex [guaifenesin er]    Social History:   Social History     Tobacco Use   Smoking Status Current Some Day Smoker    Packs/day: 0.50    Years: 20.00    Pack years: 10.00    Types: Cigarettes   Smokeless Tobacco Never Used     Social History     Substance and Sexual Activity   Alcohol Use No    Alcohol/week: 0.0 oz     Social History     Substance and Sexual Activity   Drug Use No       Family History:  Family History   Problem Relation Age of Onset    Depression Mother     Anxiety Disorder Mother     Breast Cancer Mother     Cancer Mother     Heart Disease Father     Cancer Father         Pancreatic cancer.  Diabetes Father     High Blood Pressure Father     Depression Brother     Cancer Maternal Grandmother         cervical    Kidney Disease Maternal Grandmother     Other Paternal Grandmother         alzhiemers    Other Son         hydrocephaly    Glaucoma Neg Hx     Cataracts Neg Hx          REVIEW OF SYSTEMS:  Please see the ROS form attached to today's encounter. I have reviewed it with the patient during the visit. PHYSICAL EXAM:  Patient is a age-appropriate 81-year-old female, alert and oriented ×3 and in no acute distress. The patient is well dressed and well groomed and stands with normal body position. The patient is in a calm mood. She continues to be mildly tender palpation of the a.c. Joint. Full shoulder range of motion. No instability. No weakness with resisted rotator cuff testing. Full elbow and wrist range of motion bilaterally. She has full finger range of motion bilaterally as well. The patient has discomfort with left thumb range of motion. There is mild crepitance with range of motion. She is exquisitely tender to palpation along the first [facility] joint and this reproduces her symptoms. [facility] grind test is positive.  No instability. No significant swelling, erythema, ecchymosis, or warmth. There is no deformity on inspection. The skin is intact with no evidence of skin compromise, open wounds, mass, or lesion. No lymphadenopathy. Palpable pulses distally. Brisk capillary refill. Intact sensation to light touch throughout the extremities. Deep tendon reflexes are intact and symmetric. Good strength in the extremities. Radiology:  No new radiographs today. ASSESSMENT/PLAN:  1. CMC arthritis    2. DJD of AC (acromioclavicular) joint    3. Chronic left shoulder pain        Patient states that they get significant relief with 1st CMC corticosteroid injections. I have recommended repeat injection. The risks, benefits, and alternatives of the injection were discussed with the patient. We also discussed possible complications, including (but not limited to) allergic reaction, pain, infection, and bleeding. The patient elected to proceed and verbally consented. Under sterile conditions, I performed an injection in the 1st [facility] joint today in the clinic using 1 cc of Depo-Medrol. The injection was performed without difficulty and the patient tolerated the injection well. The patient was instructed to ice if uncomfortable. They will call our office with adverse reactions or questions and we will see them back on an as-needed basis. In the meantime, I have also provided her with a thumb spica brace to wear when active.         Josue Wheeler PA-C

## 2019-07-18 ENCOUNTER — OFFICE VISIT (OUTPATIENT)
Dept: PRIMARY CARE CLINIC | Age: 49
End: 2019-07-18
Payer: COMMERCIAL

## 2019-07-18 VITALS
SYSTOLIC BLOOD PRESSURE: 110 MMHG | BODY MASS INDEX: 29.47 KG/M2 | HEART RATE: 72 BPM | HEIGHT: 69 IN | TEMPERATURE: 97.4 F | DIASTOLIC BLOOD PRESSURE: 68 MMHG | WEIGHT: 199 LBS

## 2019-07-18 DIAGNOSIS — I87.2 VENOUS INSUFFICIENCY OF BOTH LOWER EXTREMITIES: Primary | ICD-10-CM

## 2019-07-18 PROCEDURE — 99213 OFFICE O/P EST LOW 20 MIN: CPT | Performed by: FAMILY MEDICINE

## 2019-07-18 ASSESSMENT — PATIENT HEALTH QUESTIONNAIRE - PHQ9
SUM OF ALL RESPONSES TO PHQ QUESTIONS 1-9: 0
SUM OF ALL RESPONSES TO PHQ9 QUESTIONS 1 & 2: 0
2. FEELING DOWN, DEPRESSED OR HOPELESS: 0
1. LITTLE INTEREST OR PLEASURE IN DOING THINGS: 0
SUM OF ALL RESPONSES TO PHQ QUESTIONS 1-9: 0

## 2019-07-18 ASSESSMENT — ENCOUNTER SYMPTOMS
GASTROINTESTINAL NEGATIVE: 1
RESPIRATORY NEGATIVE: 1
EYES NEGATIVE: 1
ALLERGIC/IMMUNOLOGIC NEGATIVE: 1

## 2019-10-02 ENCOUNTER — OFFICE VISIT (OUTPATIENT)
Dept: FAMILY MEDICINE CLINIC | Age: 49
End: 2019-10-02
Payer: COMMERCIAL

## 2019-10-02 VITALS
HEIGHT: 69 IN | DIASTOLIC BLOOD PRESSURE: 70 MMHG | SYSTOLIC BLOOD PRESSURE: 112 MMHG | WEIGHT: 201 LBS | HEART RATE: 80 BPM | BODY MASS INDEX: 29.77 KG/M2

## 2019-10-02 DIAGNOSIS — M46.1 SACROILIITIS (HCC): ICD-10-CM

## 2019-10-02 DIAGNOSIS — M25.562 ACUTE PAIN OF LEFT KNEE: ICD-10-CM

## 2019-10-02 DIAGNOSIS — M18.0 OSTEOARTHRITIS OF CARPOMETACARPAL JOINTS OF BOTH THUMBS, UNSPECIFIED OSTEOARTHRITIS TYPE: Primary | ICD-10-CM

## 2019-10-02 DIAGNOSIS — Z12.31 SCREENING MAMMOGRAM, ENCOUNTER FOR: ICD-10-CM

## 2019-10-02 PROCEDURE — 99214 OFFICE O/P EST MOD 30 MIN: CPT | Performed by: PHYSICIAN ASSISTANT

## 2019-10-02 RX ORDER — MELOXICAM 15 MG/1
15 TABLET ORAL DAILY
Qty: 30 TABLET | Refills: 3 | Status: SHIPPED | OUTPATIENT
Start: 2019-10-02 | End: 2020-02-06

## 2019-10-02 ASSESSMENT — ENCOUNTER SYMPTOMS
RESPIRATORY NEGATIVE: 1
BACK PAIN: 1

## 2019-10-03 ENCOUNTER — TELEPHONE (OUTPATIENT)
Dept: FAMILY MEDICINE CLINIC | Age: 49
End: 2019-10-03

## 2019-10-14 ENCOUNTER — HOSPITAL ENCOUNTER (OUTPATIENT)
Dept: GENERAL RADIOLOGY | Age: 49
Discharge: HOME | End: 2019-10-16
Payer: COMMERCIAL

## 2019-10-14 ENCOUNTER — HOSPITAL ENCOUNTER (OUTPATIENT)
Dept: LAB | Age: 49
Discharge: HOME | End: 2019-10-14
Payer: COMMERCIAL

## 2019-10-14 ENCOUNTER — OFFICE VISIT (OUTPATIENT)
Dept: FAMILY MEDICINE CLINIC | Age: 49
End: 2019-10-14
Payer: COMMERCIAL

## 2019-10-14 VITALS
SYSTOLIC BLOOD PRESSURE: 126 MMHG | HEART RATE: 84 BPM | HEIGHT: 69 IN | BODY MASS INDEX: 29.77 KG/M2 | RESPIRATION RATE: 16 BRPM | WEIGHT: 201 LBS | OXYGEN SATURATION: 98 % | DIASTOLIC BLOOD PRESSURE: 72 MMHG

## 2019-10-14 DIAGNOSIS — M54.42 CHRONIC BILATERAL LOW BACK PAIN WITH BILATERAL SCIATICA: ICD-10-CM

## 2019-10-14 DIAGNOSIS — M25.50 ARTHRALGIA, UNSPECIFIED JOINT: ICD-10-CM

## 2019-10-14 DIAGNOSIS — M25.462 PAIN AND SWELLING OF LEFT KNEE: ICD-10-CM

## 2019-10-14 DIAGNOSIS — R29.898 WEAKNESS OF BOTH LEGS: ICD-10-CM

## 2019-10-14 DIAGNOSIS — M25.562 PAIN AND SWELLING OF LEFT KNEE: ICD-10-CM

## 2019-10-14 DIAGNOSIS — R20.0 NUMBNESS IN BOTH LEGS: ICD-10-CM

## 2019-10-14 DIAGNOSIS — Z82.61 FAMILY HISTORY OF RHEUMATOID ARTHRITIS: ICD-10-CM

## 2019-10-14 DIAGNOSIS — M25.562 ACUTE PAIN OF LEFT KNEE: ICD-10-CM

## 2019-10-14 DIAGNOSIS — G89.4 CHRONIC PAIN SYNDROME: ICD-10-CM

## 2019-10-14 DIAGNOSIS — M54.41 CHRONIC BILATERAL LOW BACK PAIN WITH BILATERAL SCIATICA: ICD-10-CM

## 2019-10-14 DIAGNOSIS — Z23 NEED FOR VACCINATION: Primary | ICD-10-CM

## 2019-10-14 DIAGNOSIS — G89.29 CHRONIC BILATERAL LOW BACK PAIN WITH BILATERAL SCIATICA: ICD-10-CM

## 2019-10-14 LAB
ABSOLUTE EOS #: 0.1 K/UL (ref 0–0.4)
ABSOLUTE IMMATURE GRANULOCYTE: ABNORMAL K/UL (ref 0–0.3)
ABSOLUTE LYMPH #: 2.2 K/UL (ref 1–4.8)
ABSOLUTE MONO #: 0.5 K/UL (ref 0.1–1.2)
ALBUMIN SERPL-MCNC: 4.7 G/DL (ref 3.5–5.2)
ALBUMIN/GLOBULIN RATIO: 1.6 (ref 1–2.5)
ALP BLD-CCNC: 69 U/L (ref 35–104)
ALT SERPL-CCNC: 9 U/L (ref 5–33)
ANION GAP SERPL CALCULATED.3IONS-SCNC: 11 MMOL/L (ref 9–17)
AST SERPL-CCNC: 17 U/L
BASOPHILS # BLD: 1 % (ref 0–1)
BASOPHILS ABSOLUTE: 0 K/UL (ref 0–0.2)
BILIRUB SERPL-MCNC: 0.89 MG/DL (ref 0.3–1.2)
BUN BLDV-MCNC: 14 MG/DL (ref 6–20)
BUN/CREAT BLD: 24 (ref 9–20)
CALCIUM SERPL-MCNC: 9.3 MG/DL (ref 8.6–10.4)
CHLORIDE BLD-SCNC: 103 MMOL/L (ref 98–107)
CO2: 26 MMOL/L (ref 20–31)
CREAT SERPL-MCNC: 0.58 MG/DL (ref 0.5–0.9)
DIFFERENTIAL TYPE: ABNORMAL
EOSINOPHILS RELATIVE PERCENT: 1 % (ref 1–7)
GFR AFRICAN AMERICAN: >60 ML/MIN
GFR NON-AFRICAN AMERICAN: >60 ML/MIN
GFR SERPL CREATININE-BSD FRML MDRD: ABNORMAL ML/MIN/{1.73_M2}
GFR SERPL CREATININE-BSD FRML MDRD: ABNORMAL ML/MIN/{1.73_M2}
GLUCOSE BLD-MCNC: 90 MG/DL (ref 70–99)
HCT VFR BLD CALC: 38.1 % (ref 36–46)
HEMOGLOBIN: 12.8 G/DL (ref 12–16)
IMMATURE GRANULOCYTES: ABNORMAL %
LYMPHOCYTES # BLD: 32 % (ref 16–46)
MCH RBC QN AUTO: 32.3 PG (ref 26–34)
MCHC RBC AUTO-ENTMCNC: 33.5 G/DL (ref 31–37)
MCV RBC AUTO: 96.3 FL (ref 80–100)
MONOCYTES # BLD: 7 % (ref 4–11)
NRBC AUTOMATED: ABNORMAL PER 100 WBC
PDW BLD-RTO: 14 % (ref 11–14.5)
PLATELET # BLD: 282 K/UL (ref 140–450)
PLATELET ESTIMATE: ABNORMAL
PMV BLD AUTO: 8.5 FL (ref 6–12)
POTASSIUM SERPL-SCNC: 3.9 MMOL/L (ref 3.7–5.3)
RBC # BLD: 3.96 M/UL (ref 4–5.2)
RBC # BLD: ABNORMAL 10*6/UL
SEDIMENTATION RATE, ERYTHROCYTE: 16 MM (ref 0–20)
SEG NEUTROPHILS: 59 % (ref 43–77)
SEGMENTED NEUTROPHILS ABSOLUTE COUNT: 4.1 K/UL (ref 1.8–7.7)
SODIUM BLD-SCNC: 140 MMOL/L (ref 135–144)
THYROXINE, FREE: 1.29 NG/DL (ref 0.93–1.7)
TOTAL PROTEIN: 7.6 G/DL (ref 6.4–8.3)
TSH SERPL DL<=0.05 MIU/L-ACNC: 3.18 MIU/L (ref 0.3–5)
WBC # BLD: 6.9 K/UL (ref 3.5–11)
WBC # BLD: ABNORMAL 10*3/UL

## 2019-10-14 PROCEDURE — 80053 COMPREHEN METABOLIC PANEL: CPT

## 2019-10-14 PROCEDURE — 86038 ANTINUCLEAR ANTIBODIES: CPT

## 2019-10-14 PROCEDURE — 84439 ASSAY OF FREE THYROXINE: CPT

## 2019-10-14 PROCEDURE — 86431 RHEUMATOID FACTOR QUANT: CPT

## 2019-10-14 PROCEDURE — 85025 COMPLETE CBC W/AUTO DIFF WBC: CPT

## 2019-10-14 PROCEDURE — 84443 ASSAY THYROID STIM HORMONE: CPT

## 2019-10-14 PROCEDURE — 99214 OFFICE O/P EST MOD 30 MIN: CPT | Performed by: PHYSICIAN ASSISTANT

## 2019-10-14 PROCEDURE — 82306 VITAMIN D 25 HYDROXY: CPT

## 2019-10-14 PROCEDURE — 82607 VITAMIN B-12: CPT

## 2019-10-14 PROCEDURE — 36415 COLL VENOUS BLD VENIPUNCTURE: CPT

## 2019-10-14 PROCEDURE — 73562 X-RAY EXAM OF KNEE 3: CPT

## 2019-10-14 PROCEDURE — 82746 ASSAY OF FOLIC ACID SERUM: CPT

## 2019-10-14 PROCEDURE — 86140 C-REACTIVE PROTEIN: CPT

## 2019-10-14 PROCEDURE — 85651 RBC SED RATE NONAUTOMATED: CPT

## 2019-10-14 RX ORDER — CYCLOBENZAPRINE HCL 10 MG
10 TABLET ORAL 3 TIMES DAILY PRN
Qty: 21 TABLET | Refills: 0 | Status: SHIPPED | OUTPATIENT
Start: 2019-10-14 | End: 2019-10-24

## 2019-10-14 RX ORDER — PREDNISONE 20 MG/1
TABLET ORAL
Qty: 18 TABLET | Refills: 0 | Status: SHIPPED | OUTPATIENT
Start: 2019-10-14 | End: 2019-11-14 | Stop reason: ALTCHOICE

## 2019-10-14 ASSESSMENT — ENCOUNTER SYMPTOMS
CONSTIPATION: 0
NAUSEA: 0
VOMITING: 0
DIARRHEA: 0
BACK PAIN: 1
RESPIRATORY NEGATIVE: 1

## 2019-10-14 ASSESSMENT — PATIENT HEALTH QUESTIONNAIRE - PHQ9
SUM OF ALL RESPONSES TO PHQ QUESTIONS 1-9: 0
2. FEELING DOWN, DEPRESSED OR HOPELESS: 0
SUM OF ALL RESPONSES TO PHQ9 QUESTIONS 1 & 2: 0
SUM OF ALL RESPONSES TO PHQ QUESTIONS 1-9: 0
1. LITTLE INTEREST OR PLEASURE IN DOING THINGS: 0

## 2019-10-15 LAB — ANTI-NUCLEAR ANTIBODY (ANA): NEGATIVE

## 2019-10-16 DIAGNOSIS — R29.898 LEG WEAKNESS, BILATERAL: Primary | ICD-10-CM

## 2019-10-16 LAB
FOLATE: 15.1 NG/ML
VITAMIN B-12: 307 PG/ML (ref 232–1245)

## 2019-10-17 DIAGNOSIS — E53.8 VITAMIN B12 DEFICIENCY: Primary | ICD-10-CM

## 2019-10-17 LAB
C-REACTIVE PROTEIN: 0.8 MG/L (ref 0–5)
RHEUMATOID FACTOR: <10 IU/ML
VITAMIN D 25-HYDROXY: 35.2 NG/ML (ref 30–100)

## 2019-10-18 ENCOUNTER — OFFICE VISIT (OUTPATIENT)
Dept: PAIN MANAGEMENT | Age: 49
End: 2019-10-18
Payer: COMMERCIAL

## 2019-10-18 VITALS
SYSTOLIC BLOOD PRESSURE: 120 MMHG | BODY MASS INDEX: 29.03 KG/M2 | HEIGHT: 69 IN | RESPIRATION RATE: 16 BRPM | DIASTOLIC BLOOD PRESSURE: 64 MMHG | WEIGHT: 196 LBS | HEART RATE: 76 BPM

## 2019-10-18 DIAGNOSIS — G57.01 PIRIFORMIS SYNDROME, RIGHT: ICD-10-CM

## 2019-10-18 DIAGNOSIS — M46.1 BILATERAL SACROILIITIS (HCC): Primary | ICD-10-CM

## 2019-10-18 DIAGNOSIS — G57.02 PIRIFORMIS SYNDROME, LEFT: ICD-10-CM

## 2019-10-18 DIAGNOSIS — M53.3 PAIN OF BOTH SACROILIAC JOINTS: ICD-10-CM

## 2019-10-18 PROCEDURE — 99203 OFFICE O/P NEW LOW 30 MIN: CPT | Performed by: NURSE PRACTITIONER

## 2019-10-18 ASSESSMENT — ENCOUNTER SYMPTOMS
CONSTIPATION: 0
BACK PAIN: 1
SHORTNESS OF BREATH: 0

## 2019-10-22 ENCOUNTER — OFFICE VISIT (OUTPATIENT)
Dept: ORTHOPEDIC SURGERY | Age: 49
End: 2019-10-22
Payer: COMMERCIAL

## 2019-10-22 VITALS
SYSTOLIC BLOOD PRESSURE: 108 MMHG | DIASTOLIC BLOOD PRESSURE: 68 MMHG | HEIGHT: 69 IN | WEIGHT: 196 LBS | HEART RATE: 86 BPM | BODY MASS INDEX: 29.03 KG/M2

## 2019-10-22 DIAGNOSIS — M17.12 PRIMARY OSTEOARTHRITIS OF LEFT KNEE: Primary | ICD-10-CM

## 2019-10-22 PROCEDURE — 99213 OFFICE O/P EST LOW 20 MIN: CPT | Performed by: PHYSICIAN ASSISTANT

## 2019-10-23 ENCOUNTER — TELEPHONE (OUTPATIENT)
Dept: PAIN MANAGEMENT | Age: 49
End: 2019-10-23

## 2019-10-23 ENCOUNTER — HOSPITAL ENCOUNTER (OUTPATIENT)
Dept: GENERAL RADIOLOGY | Age: 49
Discharge: HOME | End: 2019-10-25
Payer: COMMERCIAL

## 2019-10-23 ENCOUNTER — OFFICE VISIT (OUTPATIENT)
Dept: FAMILY MEDICINE CLINIC | Age: 49
End: 2019-10-23
Payer: COMMERCIAL

## 2019-10-23 VITALS
HEART RATE: 73 BPM | OXYGEN SATURATION: 99 % | DIASTOLIC BLOOD PRESSURE: 60 MMHG | BODY MASS INDEX: 30.21 KG/M2 | WEIGHT: 204 LBS | SYSTOLIC BLOOD PRESSURE: 106 MMHG | HEIGHT: 69 IN | RESPIRATION RATE: 14 BRPM

## 2019-10-23 DIAGNOSIS — M79.605 BILATERAL LEG PAIN: ICD-10-CM

## 2019-10-23 DIAGNOSIS — Z23 NEED FOR VACCINATION: ICD-10-CM

## 2019-10-23 DIAGNOSIS — M54.50 CHRONIC BILATERAL LOW BACK PAIN WITHOUT SCIATICA: Primary | ICD-10-CM

## 2019-10-23 DIAGNOSIS — Z12.31 SCREENING MAMMOGRAM, ENCOUNTER FOR: ICD-10-CM

## 2019-10-23 DIAGNOSIS — M54.50 CHRONIC BILATERAL LOW BACK PAIN WITHOUT SCIATICA: ICD-10-CM

## 2019-10-23 DIAGNOSIS — M46.1 SACROILIITIS (HCC): ICD-10-CM

## 2019-10-23 DIAGNOSIS — M79.604 BILATERAL LEG PAIN: ICD-10-CM

## 2019-10-23 DIAGNOSIS — G89.29 CHRONIC BILATERAL LOW BACK PAIN WITHOUT SCIATICA: Primary | ICD-10-CM

## 2019-10-23 DIAGNOSIS — G89.29 CHRONIC BILATERAL LOW BACK PAIN WITHOUT SCIATICA: ICD-10-CM

## 2019-10-23 PROCEDURE — 99213 OFFICE O/P EST LOW 20 MIN: CPT | Performed by: PHYSICIAN ASSISTANT

## 2019-10-23 PROCEDURE — 72100 X-RAY EXAM L-S SPINE 2/3 VWS: CPT

## 2019-10-23 ASSESSMENT — PATIENT HEALTH QUESTIONNAIRE - PHQ9
SUM OF ALL RESPONSES TO PHQ QUESTIONS 1-9: 0
1. LITTLE INTEREST OR PLEASURE IN DOING THINGS: 0
SUM OF ALL RESPONSES TO PHQ QUESTIONS 1-9: 0

## 2019-10-28 ENCOUNTER — HOSPITAL ENCOUNTER (OUTPATIENT)
Dept: INTERVENTIONAL RADIOLOGY/VASCULAR | Age: 49
Discharge: HOME | End: 2019-10-30
Payer: COMMERCIAL

## 2019-10-28 DIAGNOSIS — M79.605 BILATERAL LEG PAIN: ICD-10-CM

## 2019-10-28 DIAGNOSIS — M79.604 BILATERAL LEG PAIN: ICD-10-CM

## 2019-10-28 PROCEDURE — 93922 UPR/L XTREMITY ART 2 LEVELS: CPT

## 2019-11-14 ENCOUNTER — OFFICE VISIT (OUTPATIENT)
Dept: FAMILY MEDICINE CLINIC | Age: 49
End: 2019-11-14
Payer: COMMERCIAL

## 2019-11-14 VITALS
HEART RATE: 79 BPM | OXYGEN SATURATION: 99 % | HEIGHT: 69 IN | RESPIRATION RATE: 14 BRPM | WEIGHT: 203 LBS | SYSTOLIC BLOOD PRESSURE: 132 MMHG | DIASTOLIC BLOOD PRESSURE: 70 MMHG | BODY MASS INDEX: 30.07 KG/M2

## 2019-11-14 DIAGNOSIS — R20.0 NUMBNESS AND TINGLING OF BOTH LEGS: ICD-10-CM

## 2019-11-14 DIAGNOSIS — M79.604 BILATERAL LEG PAIN: ICD-10-CM

## 2019-11-14 DIAGNOSIS — N92.1 MENOMETRORRHAGIA: ICD-10-CM

## 2019-11-14 DIAGNOSIS — F41.1 GENERALIZED ANXIETY DISORDER: ICD-10-CM

## 2019-11-14 DIAGNOSIS — R20.2 NUMBNESS AND TINGLING OF BOTH LEGS: ICD-10-CM

## 2019-11-14 DIAGNOSIS — Z13.9 SCREENING FOR CONDITION: ICD-10-CM

## 2019-11-14 DIAGNOSIS — G89.29 ACUTE EXACERBATION OF CHRONIC LOW BACK PAIN: Primary | ICD-10-CM

## 2019-11-14 DIAGNOSIS — M54.50 ACUTE EXACERBATION OF CHRONIC LOW BACK PAIN: Primary | ICD-10-CM

## 2019-11-14 DIAGNOSIS — Z00.00 ANNUAL PHYSICAL EXAM: ICD-10-CM

## 2019-11-14 DIAGNOSIS — N94.6 DYSMENORRHEA: ICD-10-CM

## 2019-11-14 DIAGNOSIS — Z72.0 TOBACCO USE: ICD-10-CM

## 2019-11-14 DIAGNOSIS — M79.605 BILATERAL LEG PAIN: ICD-10-CM

## 2019-11-14 PROCEDURE — 99396 PREV VISIT EST AGE 40-64: CPT | Performed by: PHYSICIAN ASSISTANT

## 2019-11-14 ASSESSMENT — ENCOUNTER SYMPTOMS
BACK PAIN: 1
RESPIRATORY NEGATIVE: 1
GASTROINTESTINAL NEGATIVE: 1

## 2019-11-14 ASSESSMENT — PATIENT HEALTH QUESTIONNAIRE - PHQ9
SUM OF ALL RESPONSES TO PHQ QUESTIONS 1-9: 0
SUM OF ALL RESPONSES TO PHQ9 QUESTIONS 1 & 2: 0
1. LITTLE INTEREST OR PLEASURE IN DOING THINGS: 0
SUM OF ALL RESPONSES TO PHQ QUESTIONS 1-9: 0
2. FEELING DOWN, DEPRESSED OR HOPELESS: 0

## 2019-11-15 ENCOUNTER — HOSPITAL ENCOUNTER (OUTPATIENT)
Dept: MAMMOGRAPHY | Age: 49
Discharge: HOME | End: 2019-11-17
Payer: COMMERCIAL

## 2019-11-15 ENCOUNTER — TELEPHONE (OUTPATIENT)
Dept: FAMILY MEDICINE CLINIC | Age: 49
End: 2019-11-15

## 2019-11-15 ENCOUNTER — HOSPITAL ENCOUNTER (OUTPATIENT)
Dept: ULTRASOUND IMAGING | Age: 49
Discharge: HOME | End: 2019-11-17
Payer: COMMERCIAL

## 2019-11-15 DIAGNOSIS — N92.1 MENOMETRORRHAGIA: ICD-10-CM

## 2019-11-15 DIAGNOSIS — R92.8 ABNORMAL MAMMOGRAM: Primary | ICD-10-CM

## 2019-11-15 DIAGNOSIS — N94.6 DYSMENORRHEA: ICD-10-CM

## 2019-11-15 DIAGNOSIS — Z12.31 SCREENING MAMMOGRAM, ENCOUNTER FOR: ICD-10-CM

## 2019-11-15 DIAGNOSIS — N92.0 MENORRHAGIA WITH REGULAR CYCLE: Primary | ICD-10-CM

## 2019-11-15 PROCEDURE — 77063 BREAST TOMOSYNTHESIS BI: CPT

## 2019-11-15 PROCEDURE — 76830 TRANSVAGINAL US NON-OB: CPT

## 2019-11-15 PROCEDURE — 76856 US EXAM PELVIC COMPLETE: CPT

## 2019-11-18 ENCOUNTER — TELEPHONE (OUTPATIENT)
Dept: FAMILY MEDICINE CLINIC | Age: 49
End: 2019-11-18

## 2019-11-18 DIAGNOSIS — R92.8 ABNORMAL MAMMOGRAM: Primary | ICD-10-CM

## 2019-11-22 ENCOUNTER — TELEPHONE (OUTPATIENT)
Dept: FAMILY MEDICINE CLINIC | Age: 49
End: 2019-11-22

## 2019-11-22 ENCOUNTER — HOSPITAL ENCOUNTER (OUTPATIENT)
Dept: MAMMOGRAPHY | Age: 49
Discharge: HOME | End: 2019-11-24
Payer: COMMERCIAL

## 2019-11-22 ENCOUNTER — HOSPITAL ENCOUNTER (OUTPATIENT)
Dept: ULTRASOUND IMAGING | Age: 49
Discharge: HOME | End: 2019-11-24
Payer: COMMERCIAL

## 2019-11-22 DIAGNOSIS — R92.8 ABNORMAL MAMMOGRAM: ICD-10-CM

## 2019-11-22 PROCEDURE — G0279 TOMOSYNTHESIS, MAMMO: HCPCS

## 2019-11-22 PROCEDURE — 76642 ULTRASOUND BREAST LIMITED: CPT

## 2019-11-25 ENCOUNTER — OFFICE VISIT (OUTPATIENT)
Dept: OBGYN | Age: 49
End: 2019-11-25
Payer: COMMERCIAL

## 2019-11-25 VITALS
BODY MASS INDEX: 30.66 KG/M2 | DIASTOLIC BLOOD PRESSURE: 70 MMHG | WEIGHT: 207 LBS | HEIGHT: 69 IN | SYSTOLIC BLOOD PRESSURE: 124 MMHG | HEART RATE: 74 BPM | RESPIRATION RATE: 20 BRPM

## 2019-11-25 DIAGNOSIS — R10.2 PELVIC PAIN: ICD-10-CM

## 2019-11-25 DIAGNOSIS — D25.1 INTRAMURAL LEIOMYOMA OF UTERUS: ICD-10-CM

## 2019-11-25 DIAGNOSIS — N92.0 MENORRHAGIA WITH REGULAR CYCLE: Primary | ICD-10-CM

## 2019-11-25 PROCEDURE — 99203 OFFICE O/P NEW LOW 30 MIN: CPT | Performed by: OBSTETRICS & GYNECOLOGY

## 2019-11-29 ENCOUNTER — HOSPITAL ENCOUNTER (OUTPATIENT)
Dept: MRI IMAGING | Age: 49
Discharge: HOME | End: 2019-12-01
Payer: COMMERCIAL

## 2019-11-29 DIAGNOSIS — G89.29 ACUTE EXACERBATION OF CHRONIC LOW BACK PAIN: ICD-10-CM

## 2019-11-29 DIAGNOSIS — M79.605 BILATERAL LEG PAIN: ICD-10-CM

## 2019-11-29 DIAGNOSIS — R20.0 NUMBNESS AND TINGLING OF BOTH LEGS: ICD-10-CM

## 2019-11-29 DIAGNOSIS — M79.604 BILATERAL LEG PAIN: ICD-10-CM

## 2019-11-29 DIAGNOSIS — R20.2 NUMBNESS AND TINGLING OF BOTH LEGS: ICD-10-CM

## 2019-11-29 DIAGNOSIS — M54.50 ACUTE EXACERBATION OF CHRONIC LOW BACK PAIN: ICD-10-CM

## 2019-11-29 PROCEDURE — 72148 MRI LUMBAR SPINE W/O DYE: CPT

## 2019-12-04 ENCOUNTER — PATIENT MESSAGE (OUTPATIENT)
Dept: FAMILY MEDICINE CLINIC | Age: 49
End: 2019-12-04

## 2019-12-04 DIAGNOSIS — G89.29 CHRONIC BILATERAL LOW BACK PAIN WITH BILATERAL SCIATICA: ICD-10-CM

## 2019-12-04 DIAGNOSIS — M54.42 CHRONIC BILATERAL LOW BACK PAIN WITH BILATERAL SCIATICA: ICD-10-CM

## 2019-12-04 DIAGNOSIS — M54.41 CHRONIC BILATERAL LOW BACK PAIN WITH BILATERAL SCIATICA: ICD-10-CM

## 2019-12-04 DIAGNOSIS — M46.1 SACROILIITIS (HCC): Primary | ICD-10-CM

## 2020-01-10 ENCOUNTER — OFFICE VISIT (OUTPATIENT)
Dept: PAIN MANAGEMENT | Age: 50
End: 2020-01-10
Payer: COMMERCIAL

## 2020-01-10 VITALS
HEIGHT: 69 IN | WEIGHT: 207.01 LBS | DIASTOLIC BLOOD PRESSURE: 84 MMHG | BODY MASS INDEX: 30.66 KG/M2 | SYSTOLIC BLOOD PRESSURE: 105 MMHG | RESPIRATION RATE: 16 BRPM

## 2020-01-10 PROCEDURE — 99214 OFFICE O/P EST MOD 30 MIN: CPT | Performed by: NURSE PRACTITIONER

## 2020-01-10 ASSESSMENT — ENCOUNTER SYMPTOMS
CONSTIPATION: 0
BACK PAIN: 1
SHORTNESS OF BREATH: 0

## 2020-01-10 NOTE — PATIENT INSTRUCTIONS
[facility]  [address] [facility] [place], [address]  Telephone [phone]  Fax [phone]      PROCEDURE INSTRUCTIONS FOR  PAIN MANAGEMENT PROCEDURES WITHOUT IV SEDATION    Carmen Allen scheduled to see Dr. Jossy Coley to undergo the following procedure:  Bilateral Sacroiliac joint and piriformis injection    Procedure Date: ____1/24/20____  Arrival Time: ____9:30am____     Report to the [place] [id number], Registration office on the 1st floor in the hospital, after check in and signing of paper work you will then go to the second floor to the surgery center. 1. Stop the following medications prior to the procedure:    NONE    2. Take all routine medications unless otherwise instructed. Ok to take vitamins and antiinflammatory medications    3. EATING & DRINKING:  Ok to eat or drink before the injection - no IV sedation will be used. 4. If you are allergic to contrast or iodine, you must take benadryl and prednisone prior to the injection to prevent an allergic reaction. Follow the directions on the prescription for the times to take the medication. 5. Oral valium can be prescribed if your are anxious about the injections or feel that you can not lay still during the injection. If you take valium, you must have a . Make arrangements for a family member or friend to drive you to the surgery center. Your ride must stay in the hospital while you are having the injection done. If they cannot stay, the injection will be rescheduled. The valium may affect your judgment following the procedure and driving a vehicle within 24 hours after the sedation could be dangerous. 6.  Wear simple loose clothing, which can be easily changed. 7.  Leave jewelry (including rings) and other valuables at home. 8.  You will be asked to sign several forms prior to surgery; patients under the age of 25 must have a parent or legal guardian sign the permit to be able to do the procedure. following the procedure and driving a vehicle within 24 hours after the sedation could be dangerous. 6.  Wear simple loose clothing, which can be easily changed. 7.  Leave jewelry (including rings) and other valuables at home. 8.  You will be asked to sign several forms prior to surgery; patients under the age of 25 must have a parent or legal guardian sign the permit to be able to do the procedure. 9.  You must have finished any antibiotic prescribed for recent infections. If required, please take pre-procedure antibiotic or other pre-procedure medications as instructed. 10. Bring inhalers and pain medications with you to your procedure. 11. Bring your MRI/CT films if they were done outside of the [facility]. 12. If you should develop a cold, sore throat, cough, fever or other new indication of illness or infection, or are started on antibiotics within 2 weeks of the scheduled procedure, please notify the [facility] office as early as possible at [phone].

## 2020-01-10 NOTE — PROGRESS NOTES
Fear of current or ex partner: None     Emotionally abused: None     Physically abused: None     Forced sexual activity: None   Other Topics Concern    None   Social History Narrative    None     Review of Systems   Respiratory: Negative for shortness of breath. Cardiovascular: Negative for chest pain. Gastrointestinal: Negative for constipation. Genitourinary: Negative for difficulty urinating. Musculoskeletal: Positive for arthralgias, back pain and myalgias. Psychiatric/Behavioral: Positive for sleep disturbance. Objective:   Physical Exam  Vitals signs reviewed. Constitutional:       General: She is not in acute distress. Appearance: She is well-developed. She is not ill-appearing, toxic-appearing or diaphoretic. Interventions: She is not intubated. HENT:      Head: Normocephalic and atraumatic. Cardiovascular:      Rate and Rhythm: Normal rate. Pulmonary:      Effort: Pulmonary effort is normal. No tachypnea, bradypnea, accessory muscle usage or respiratory distress. She is not intubated. Musculoskeletal:      Lumbar back: She exhibits bony tenderness (bilateral PSIS). Skin:     General: Skin is warm and dry. Capillary Refill: Capillary refill takes less than 2 seconds. Coloration: Skin is not pale. Nails: There is no clubbing. Neurological:      Mental Status: She is alert and oriented to person, place, and time. GCS: GCS eye subscore is 4. GCS verbal subscore is 5. GCS motor subscore is 6. Cranial Nerves: No cranial nerve deficit. Psychiatric:         Speech: Speech normal.         Behavior: Behavior normal. Behavior is not agitated, aggressive, withdrawn or combative. Behavior is cooperative. Judgment: Judgment normal. Judgment is not impulsive or inappropriate. Assessment:      1. Piriformis syndrome, right    2. Piriformis syndrome, left    3.  Pain of both sacroiliac joints          Plan:    Neurosurgeon note reviewed, no

## 2020-01-24 ENCOUNTER — HOSPITAL ENCOUNTER (OUTPATIENT)
Age: 50
Setting detail: OUTPATIENT SURGERY
Discharge: HOME | End: 2020-01-24
Attending: PHYSICAL MEDICINE & REHABILITATION | Admitting: PHYSICAL MEDICINE & REHABILITATION
Payer: COMMERCIAL

## 2020-01-24 ENCOUNTER — APPOINTMENT (OUTPATIENT)
Dept: GENERAL RADIOLOGY | Age: 50
End: 2020-01-24
Attending: PHYSICAL MEDICINE & REHABILITATION
Payer: COMMERCIAL

## 2020-01-24 VITALS
TEMPERATURE: 97.5 F | BODY MASS INDEX: 31.09 KG/M2 | SYSTOLIC BLOOD PRESSURE: 106 MMHG | DIASTOLIC BLOOD PRESSURE: 65 MMHG | RESPIRATION RATE: 16 BRPM | OXYGEN SATURATION: 98 % | HEART RATE: 75 BPM | HEIGHT: 69 IN | WEIGHT: 209.9 LBS

## 2020-01-24 PROCEDURE — 3209999900 FLUORO FOR SURGICAL PROCEDURES

## 2020-01-24 PROCEDURE — 6360000002 HC RX W HCPCS: Performed by: PHYSICAL MEDICINE & REHABILITATION

## 2020-01-24 PROCEDURE — 77002 NEEDLE LOCALIZATION BY XRAY: CPT | Performed by: PHYSICAL MEDICINE & REHABILITATION

## 2020-01-24 PROCEDURE — 27096 INJECT SACROILIAC JOINT: CPT | Performed by: PHYSICAL MEDICINE & REHABILITATION

## 2020-01-24 PROCEDURE — 2500000003 HC RX 250 WO HCPCS: Performed by: PHYSICAL MEDICINE & REHABILITATION

## 2020-01-24 PROCEDURE — 7100000010 HC PHASE II RECOVERY - FIRST 15 MIN: Performed by: PHYSICAL MEDICINE & REHABILITATION

## 2020-01-24 PROCEDURE — 3600000056 HC PAIN LEVEL 4 BASE: Performed by: PHYSICAL MEDICINE & REHABILITATION

## 2020-01-24 PROCEDURE — 6360000004 HC RX CONTRAST MEDICATION: Performed by: PHYSICAL MEDICINE & REHABILITATION

## 2020-01-24 PROCEDURE — 20552 NJX 1/MLT TRIGGER POINT 1/2: CPT | Performed by: PHYSICAL MEDICINE & REHABILITATION

## 2020-01-24 PROCEDURE — 2709999900 HC NON-CHARGEABLE SUPPLY: Performed by: PHYSICAL MEDICINE & REHABILITATION

## 2020-01-24 PROCEDURE — G0260 INJ FOR SACROILIAC JT ANESTH: HCPCS

## 2020-01-24 RX ORDER — DEXAMETHASONE SODIUM PHOSPHATE 10 MG/ML
INJECTION INTRAMUSCULAR; INTRAVENOUS PRN
Status: DISCONTINUED | OUTPATIENT
Start: 2020-01-24 | End: 2020-01-24 | Stop reason: ALTCHOICE

## 2020-01-24 RX ORDER — LIDOCAINE HYDROCHLORIDE 20 MG/ML
INJECTION, SOLUTION EPIDURAL; INFILTRATION; INTRACAUDAL; PERINEURAL PRN
Status: DISCONTINUED | OUTPATIENT
Start: 2020-01-24 | End: 2020-01-24 | Stop reason: ALTCHOICE

## 2020-01-24 RX ORDER — ROPIVACAINE HYDROCHLORIDE 5 MG/ML
INJECTION, SOLUTION EPIDURAL; INFILTRATION; PERINEURAL PRN
Status: DISCONTINUED | OUTPATIENT
Start: 2020-01-24 | End: 2020-01-24 | Stop reason: ALTCHOICE

## 2020-01-24 ASSESSMENT — PAIN DESCRIPTION - PAIN TYPE: TYPE: CHRONIC PAIN

## 2020-01-24 ASSESSMENT — PAIN SCALES - GENERAL: PAINLEVEL_OUTOF10: 2

## 2020-01-24 ASSESSMENT — PAIN - FUNCTIONAL ASSESSMENT: PAIN_FUNCTIONAL_ASSESSMENT: 0-10

## 2020-01-24 ASSESSMENT — PAIN DESCRIPTION - ORIENTATION: ORIENTATION: RIGHT;LEFT

## 2020-01-24 ASSESSMENT — PAIN DESCRIPTION - LOCATION: LOCATION: LEG

## 2020-01-24 NOTE — OP NOTE
[address] JOINT INJECTION    1/24/20    Surgeon: Justice Pastor MD    Pre-operative Diagnosis:   Patient Active Problem List   Diagnosis Code    Tobacco abuse Z72.0    Anxiety F41.9    Menorrhagia N92.0    Dysmenorrhea N94.6    Peptic ulcer disease K27.9    Ingrown toenail L60.0    Toenail fungus B35.1    Dyshidrotic eczema L30.1    Right shoulder pain M25.511    Chronic low back pain M54.5, G89.29    Bilateral sacroiliitis (HCC) M46.1    Pain of both sacroiliac joints M53.3    Piriformis syndrome, right G57.01    Piriformis syndrome, left G57.02       Post-operative Diagnosis: Same    Assistants: none    INDICATION::Please see H&P for details on previous treatments, examination findings, and work up. Bilateral  sacroiliac joint injection with arthrography is requested for diagnostic reasons. Conservative treatment was ineffective i.e.: ice, NSAIDS, rest, narcotic medication, chiropractic care, physical therapy and message therapy. Patient is unable to perform the following ADL's: ambulating and grooming     Pain Assessment: 0-10  Pain Level: 5     Pain Orientation: Right, Left  Pain Location: Back, Leg       Last Plain films: 2019    EXAMINATION: Bilateral sacroiliac joint injection with arthrography. CONSENT:  Written consent was obtained from the patient on preprinted consent form after explaining the procedure, indications, potential complications and outcomes. Alternative treatments were also discussed. DISCUSSION:  The patient was sterilely prepped and draped in the usual fashion in the prone position.  Time out was verified for correct patient, side, level and procedure. SEDATION:   No conscious sedation was performed during the procedure.  The patient remained awake and conversed throughout the procedure.  The patient underwent pulse oximetry and blood pressure monitoring independently by a trained observer, as well as by a physician.     PROCEDURES #1 to #3:  Under Activity Diary.   2. Contact the [address] for symptom exacerbation, fever or unusual symptoms. 3. Post-procedure care according to verbal and written discharge instructions    PELVIC FLUOROSCOPIC IMAGE INTERPRETATION    EXAMINATION:  AP and lateral views. FLUORO TIME: 23 seconds    DISCUSSION:  Spot views of the spine reveal normal alignment and segmentation. Spinal needles are positioned in the Bilateral sacroiliac joint. Contrast outlines the joint space and reveals excellent contrast flow. Spinal needles are placed just anterior to the sacrum for the piriformis muscle injection. Contrast pattern is consistent with contrast in the muscle. Visualized spine reveals See radiology report. Soft tissues reveal no abnormalities.     IMPRESSION:  Bilateral sacroiliac joint arthrogram  with satiafactory needle placement and contrast dispersal.     Electronically signed by Talita Cunningham MD on 1/24/2020 at 11:35 AM

## 2020-01-24 NOTE — H&P
consideration to impingement and rotator cuff syndrome.  Tobacco abuse      Toenail fungus       Developing, right hallux. Past Surgical History         Past Surgical History:   Procedure Laterality Date    APPENDECTOMY        CHOLECYSTECTOMY   01/17/1997    DILATION AND CURETTAGE OF UTERUS        ERCP   01/14/1997     With endoscopic retrograde sphincterotomy and common bile duct stone extraction.  OR EXC SKIN BENIG 0.6-1CM TRUNK,ARM,LEG Right 2/15/2018     Excision Lesion Sebaceous Cyst Right Shoulder performed by Sonny Lozoya MD at [address]   03/30/1999            Family History         Family History   Problem Relation Age of Onset    Depression Mother      Anxiety Disorder Mother      Breast Cancer Mother      Cancer Mother      Heart Disease Father      Cancer Father           Pancreatic cancer.     Diabetes Father      High Blood Pressure Father      Depression Brother      Cancer Maternal Grandmother           cervical    Kidney Disease Maternal Grandmother      Other Paternal Grandmother           alzhiemers    Other Son           hydrocephaly    Mental Illness Daughter      Glaucoma Neg Hx      Cataracts Neg Hx              Social History   Social History            Socioeconomic History    Marital status: Single       Spouse name: None    Number of children: None    Years of education: None    Highest education level: None   Occupational History    None   Social Needs    Financial resource strain: None    Food insecurity:       Worry: None       Inability: None    Transportation needs:       Medical: None       Non-medical: None   Tobacco Use    Smoking status: Current Some Day Smoker       Packs/day: 0.50       Years: 20.00       Pack years: 10.00       Types: Cigarettes    Smokeless tobacco: Never Used   Substance and Sexual Activity    Alcohol use: No       Alcohol/week: 0.0 standard drinks    Drug use: No    Sexual activity: Yes Partners: Male   Lifestyle    Physical activity:       Days per week: None       Minutes per session: None    Stress: None   Relationships    Social connections:       Talks on phone: None       Gets together: None       Attends Mandaeism service: None       Active member of club or organization: None       Attends meetings of clubs or organizations: None       Relationship status: None    Intimate partner violence:       Fear of current or ex partner: None       Emotionally abused: None       Physically abused: None       Forced sexual activity: None   Other Topics Concern    None   Social History Narrative    None         Review of Systems   Respiratory: Negative for shortness of breath. Cardiovascular: Negative for chest pain. Gastrointestinal: Negative for constipation. Genitourinary: Negative for difficulty urinating. Musculoskeletal: Positive for arthralgias, back pain and myalgias. Psychiatric/Behavioral: Positive for sleep disturbance. Objective:   Physical Exam  Vitals signs reviewed. Constitutional:       General: She is not in acute distress. Appearance: She is well-developed. She is not ill-appearing, toxic-appearing or diaphoretic. Interventions: She is not intubated. HENT:      Head: Normocephalic and atraumatic. Cardiovascular:      Rate and Rhythm: Normal rate. Pulmonary:      Effort: Pulmonary effort is normal. No tachypnea, bradypnea, accessory muscle usage or respiratory distress. She is not intubated. Musculoskeletal:      Lumbar back: She exhibits bony tenderness (bilateral PSIS). Skin:     General: Skin is warm and dry. Capillary Refill: Capillary refill takes less than 2 seconds. Coloration: Skin is not pale. Nails: There is no clubbing. Neurological:      Mental Status: She is alert and oriented to person, place, and time. GCS: GCS eye subscore is 4. GCS verbal subscore is 5. GCS motor subscore is 6.       Cranial

## 2020-01-24 NOTE — INTERVAL H&P NOTE
I have interviewed and examined the patient and reviewed the recent History and Physical.  There have been no changes to the recent H&P documentation. The surgical consent form has been signed. Last anticoagulant medication use was:na    Premedication taken for contrast allergy? No    Valium taken for oral sedation? No    Outpatient Medications Marked as Taking for the 1/24/20 encounter [facility] Encounter)   Medication Sig Dispense Refill    meloxicam (MOBIC) 15 MG tablet Take 1 tablet by mouth daily 30 tablet 3       The patient understands the planned operation and its associated risks and benefits and agrees to proceed.         Electronically signed by Keya Dubois MD on 1/24/2020 at 11:20 AM

## 2020-02-06 RX ORDER — MELOXICAM 15 MG/1
TABLET ORAL
Qty: 30 TABLET | Refills: 3 | Status: SHIPPED | OUTPATIENT
Start: 2020-02-06

## 2020-02-07 ENCOUNTER — HOSPITAL ENCOUNTER (OUTPATIENT)
Age: 50
Setting detail: OUTPATIENT SURGERY
Discharge: HOME | End: 2020-02-07
Attending: PHYSICAL MEDICINE & REHABILITATION | Admitting: PHYSICAL MEDICINE & REHABILITATION
Payer: COMMERCIAL

## 2020-02-07 ENCOUNTER — APPOINTMENT (OUTPATIENT)
Dept: GENERAL RADIOLOGY | Age: 50
End: 2020-02-07
Attending: PHYSICAL MEDICINE & REHABILITATION
Payer: COMMERCIAL

## 2020-02-07 VITALS
DIASTOLIC BLOOD PRESSURE: 64 MMHG | OXYGEN SATURATION: 100 % | SYSTOLIC BLOOD PRESSURE: 130 MMHG | HEART RATE: 71 BPM | TEMPERATURE: 97.9 F | RESPIRATION RATE: 18 BRPM

## 2020-02-07 PROCEDURE — 2709999900 HC NON-CHARGEABLE SUPPLY: Performed by: PHYSICAL MEDICINE & REHABILITATION

## 2020-02-07 PROCEDURE — 27096 INJECT SACROILIAC JOINT: CPT | Performed by: PHYSICAL MEDICINE & REHABILITATION

## 2020-02-07 PROCEDURE — 7100000010 HC PHASE II RECOVERY - FIRST 15 MIN: Performed by: PHYSICAL MEDICINE & REHABILITATION

## 2020-02-07 PROCEDURE — 6360000004 HC RX CONTRAST MEDICATION: Performed by: PHYSICAL MEDICINE & REHABILITATION

## 2020-02-07 PROCEDURE — 77002 NEEDLE LOCALIZATION BY XRAY: CPT | Performed by: PHYSICAL MEDICINE & REHABILITATION

## 2020-02-07 PROCEDURE — 2500000003 HC RX 250 WO HCPCS: Performed by: PHYSICAL MEDICINE & REHABILITATION

## 2020-02-07 PROCEDURE — 3209999900 FLUORO FOR SURGICAL PROCEDURES

## 2020-02-07 PROCEDURE — 6360000002 HC RX W HCPCS: Performed by: PHYSICAL MEDICINE & REHABILITATION

## 2020-02-07 PROCEDURE — 3600000057 HC PAIN LEVEL 4 ADDL 15 MIN: Performed by: PHYSICAL MEDICINE & REHABILITATION

## 2020-02-07 PROCEDURE — 3600000056 HC PAIN LEVEL 4 BASE: Performed by: PHYSICAL MEDICINE & REHABILITATION

## 2020-02-07 PROCEDURE — 20552 NJX 1/MLT TRIGGER POINT 1/2: CPT | Performed by: PHYSICAL MEDICINE & REHABILITATION

## 2020-02-07 RX ORDER — DEXAMETHASONE SODIUM PHOSPHATE 10 MG/ML
INJECTION INTRAMUSCULAR; INTRAVENOUS PRN
Status: DISCONTINUED | OUTPATIENT
Start: 2020-02-07 | End: 2020-02-07 | Stop reason: ALTCHOICE

## 2020-02-07 RX ORDER — ROPIVACAINE HYDROCHLORIDE 5 MG/ML
INJECTION, SOLUTION EPIDURAL; INFILTRATION; PERINEURAL PRN
Status: DISCONTINUED | OUTPATIENT
Start: 2020-02-07 | End: 2020-02-07 | Stop reason: ALTCHOICE

## 2020-02-07 RX ORDER — LIDOCAINE HYDROCHLORIDE 20 MG/ML
INJECTION, SOLUTION EPIDURAL; INFILTRATION; INTRACAUDAL; PERINEURAL PRN
Status: DISCONTINUED | OUTPATIENT
Start: 2020-02-07 | End: 2020-02-07 | Stop reason: ALTCHOICE

## 2020-02-07 ASSESSMENT — PAIN DESCRIPTION - DESCRIPTORS
DESCRIPTORS: ACHING
DESCRIPTORS: ACHING

## 2020-02-07 ASSESSMENT — PAIN DESCRIPTION - LOCATION: LOCATION: BACK

## 2020-02-07 ASSESSMENT — PAIN DESCRIPTION - ORIENTATION: ORIENTATION: RIGHT

## 2020-02-07 ASSESSMENT — PAIN SCALES - GENERAL: PAINLEVEL_OUTOF10: 6

## 2020-02-07 ASSESSMENT — PAIN DESCRIPTION - PAIN TYPE: TYPE: CHRONIC PAIN

## 2020-02-07 ASSESSMENT — PAIN - FUNCTIONAL ASSESSMENT: PAIN_FUNCTIONAL_ASSESSMENT: 0-10

## 2020-02-07 NOTE — INTERVAL H&P NOTE
I have interviewed and examined the patient and reviewed the recent History and Physical.  There have been no changes to the recent H&P documentation. The surgical consent form has been signed. Last anticoagulant medication use was:na    Premedication taken for contrast allergy? No    Valium taken for oral sedation? No    Outpatient Medications Marked as Taking for the 2/7/20 encounter [facility] Encounter)   Medication Sig Dispense Refill    meloxicam (MOBIC) 15 MG tablet take 1 tablet by mouth once daily 30 tablet 3    diclofenac sodium 1 % GEL Apply 2 g topically 4 times daily 1 Tube 0       The patient understands the planned operation and its associated risks and benefits and agrees to proceed.         Electronically signed by Darcy Hayden MD on 2/7/2020 at 9:37 AM

## 2020-02-07 NOTE — H&P
Onset    Depression Mother      Anxiety Disorder Mother      Breast Cancer Mother      Cancer Mother      Heart Disease Father      Cancer Father           Pancreatic cancer.     Diabetes Father      High Blood Pressure Father      Depression Brother      Cancer Maternal Grandmother           cervical    Kidney Disease Maternal Grandmother      Other Paternal Grandmother           alzhiemers    Other Son           hydrocephaly    Mental Illness Daughter      Glaucoma Neg Hx      Cataracts Neg Hx              Social History   Social History                Socioeconomic History    Marital status: Single       Spouse name: None    Number of children: None    Years of education: None    Highest education level: None   Occupational History    None   Social Needs    Financial resource strain: None    Food insecurity:       Worry: None       Inability: None    Transportation needs:       Medical: None       Non-medical: None   Tobacco Use    Smoking status: Current Some Day Smoker       Packs/day: 0.50       Years: 20.00       Pack years: 10.00       Types: Cigarettes    Smokeless tobacco: Never Used   Substance and Sexual Activity    Alcohol use: No       Alcohol/week: 0.0 standard drinks    Drug use: No    Sexual activity: Yes       Partners: Male   Lifestyle    Physical activity:       Days per week: None       Minutes per session: None    Stress: None   Relationships    Social connections:       Talks on phone: None       Gets together: None       Attends Hinduism service: None       Active member of club or organization: None       Attends meetings of clubs or organizations: None       Relationship status: None    Intimate partner violence:       Fear of current or ex partner: None       Emotionally abused: None       Physically abused: None       Forced sexual activity: None   Other Topics Concern    None   Social History Narrative    None         Review of Systems   Respiratory: to hold have been reviewed with patient. Blood thinners must be held with permission from your cardiologist or primary care physician. A letter is not required to besent to PCP/Cardiologist regarding holding medications for procedure to decrease bleeding risk.                        ELISSA Johnson - CNP                            Routing History

## 2020-02-07 NOTE — OP NOTE
[address] JOINT INJECTION    2/7/20    Surgeon: Jenniffer Ch MD    Pre-operative Diagnosis:   Patient Active Problem List   Diagnosis Code    Tobacco abuse Z72.0    Anxiety F41.9    Menorrhagia N92.0    Dysmenorrhea N94.6    Peptic ulcer disease K27.9    Ingrown toenail L60.0    Toenail fungus B35.1    Dyshidrotic eczema L30.1    Right shoulder pain M25.511    Chronic low back pain M54.5, G89.29    Bilateral sacroiliitis (HCC) M46.1    Pain of both sacroiliac joints M53.3    Piriformis syndrome, right G57.01    Piriformis syndrome, left G57.02       Post-operative Diagnosis: Same    Assistants: none    INDICATION::Please see H&P for details on previous treatments, examination findings, and work up. Bilateral  sacroiliac joint injection with arthrography is requested for diagnostic reasons. Conservative treatment was ineffective i.e.: ice, NSAIDS, rest, narcotic medication, chiropractic care, physical therapy and message therapy. Patient is unable to perform the following ADL's: ambulating and grooming     Pain Assessment: 0-10  Pain Level: 5     Pain Orientation: Right, Left  Pain Location: Back  Pain Descriptors: Aching    Last Plain films: 2019    EXAMINATION: Bilateral sacroiliac joint injection with arthrography. CONSENT:  Written consent was obtained from the patient on preprinted consent form after explaining the procedure, indications, potential complications and outcomes. Alternative treatments were also discussed. DISCUSSION:  The patient was sterilely prepped and draped in the usual fashion in the prone position.  Time out was verified for correct patient, side, level and procedure.     SEDATION: 0.5mg of Versed and 50mcg of fentanyl and 0 mg propofol IV were given IV pre procedurally and during the procedure.  The patient remained fairly sedated throughout the procedure and underwent constant continuous EKG, pulse oximetry and blood pressure monitoring independently by the injection    RECOMMENDATIONS:  1. Complete and return Post-Procedure Pain and Activity Diary.   2. Contact the [address] for symptom exacerbation, fever or unusual symptoms. 3. Post-procedure care according to verbal and written discharge instructions    PELVIC FLUOROSCOPIC IMAGE INTERPRETATION    EXAMINATION:  AP and lateral views. FLUORO TIME: 34 seconds    DISCUSSION:  Spot views of the spine reveal normal alignment and segmentation. Spinal needles are positioned in the Left sacroiliac joint. Contrast outlines the joint space and reveals excellent contrast flow. Spinal needles are placed just anterior to the sacrum for the piriformis muscle injection. Contrast pattern is consistent with contrast in the muscle. Visualized spine reveals See radiology report. Soft tissues reveal no abnormalities.     IMPRESSION:  Left sacroiliac joint arthrogram  with satiafactory needle placement and contrast dispersal.     Electronically signed by Aaliyah Qureshi MD on 2/7/2020 at 1:14 PM

## 2020-02-07 NOTE — PRE SEDATION
Sedation Pre-Procedure Note    Patient Name: Josep Dawkins   YOB: 1970  Room/Bed: [facility]  Medical Record Number: [id number]  Date: 2/7/2020   Time: 1:13 PM       Indication:  B S-I piriformis  injections    Consent: I have discussed with the patient and/or the patient representative the indication, alternatives, and the possible risks and/or complications of the planned procedure and the anesthesia methods. The patient and/or patient representative appear to understand and agree to proceed. Vital Signs:   Vitals:    02/07/20 1011   BP: 130/64   Pulse: 71   Resp: 18   Temp: 97.9 °F (36.6 °C)   SpO2: 100%       Past Medical History:   has a past medical history of Anxiety, Chronic low back pain, Dyshidrotic eczema, Dysmenorrhea, Endometritis, Ingrown toenail, Menorrhagia, Peptic ulcer disease, Right shoulder pain, Tobacco abuse, and Toenail fungus. Past Surgical History:   has a past surgical history that includes Cholecystectomy (01/17/1997); Dilation and curettage of uterus; Tubal ligation (03/30/1999); Appendectomy; ERCP (01/14/1997); pr exc skin benig 0.6-1cm trunk,arm,leg (Right, 2/15/2018); and Injection Procedure For Sacroiliac Joint (Bilateral, 1/24/2020). Medications:   Scheduled Meds:   Continuous Infusions:   PRN Meds:   Home Meds:   Prior to Admission medications    Medication Sig Start Date End Date Taking? Authorizing Provider   meloxicam (MOBIC) 15 MG tablet take 1 tablet by mouth once daily 2/6/20  Yes [place]   diclofenac sodium 1 % GEL Apply 2 g topically 4 times daily 10/2/19  Yes CHARLY Cao     Coumadin Use Last 7 Days:  no  Antiplatelet drug therapy use last 7 days: no  Other anticoagulant use last 7 days: no  Additional Medication Information:  . Pre-Sedation Documentation and Exam:   I have personally completed a history, physical exam & review of systems for this patient (see notes).     Mallampati Airway Assessment:  normal    Prior History of Anesthesia Complications:   none    ASA Classification:  Class 2 - A normal healthy patient with mild systemic disease    Sedation/ Anesthesia Plan:   intravenous sedation    Medications Planned:   midazolam (Versed) intravenously and fentanyl intravenously    Patient is an appropriate candidate for plan of sedation: yes    Electronically signed by Phillip Lawrence MD on 2/7/2020 at 1:13 PM

## 2020-02-11 ENCOUNTER — TELEPHONE (OUTPATIENT)
Dept: PAIN MANAGEMENT | Age: 50
End: 2020-02-11

## 2020-02-11 NOTE — TELEPHONE ENCOUNTER
Her appt has been moved up to 2/12/20 to discuss.      Last Appt:  1/10/2020  Next Appt:   2/12/2020  Med verified in Epic

## 2020-02-11 NOTE — TELEPHONE ENCOUNTER
Patient called stating that since she had her injections done on Friday 2/7/2020 that her pain has returned. Patient stated that her pain started last night around 5 pm from her left hip down her left leg and worsens when she gets up and down. Patient is wondering if she can get a note for work restrictions so that she can work with less pain.      Last Appt:  1/10/2020  Next Appt:   2/21/2020  Med verified in [address] [place] [address] Rd

## 2020-02-12 ENCOUNTER — OFFICE VISIT (OUTPATIENT)
Dept: PAIN MANAGEMENT | Age: 50
End: 2020-02-12
Payer: COMMERCIAL

## 2020-02-12 VITALS
SYSTOLIC BLOOD PRESSURE: 130 MMHG | HEIGHT: 69 IN | BODY MASS INDEX: 31.4 KG/M2 | WEIGHT: 212 LBS | HEART RATE: 80 BPM | DIASTOLIC BLOOD PRESSURE: 80 MMHG

## 2020-02-12 PROCEDURE — 99214 OFFICE O/P EST MOD 30 MIN: CPT | Performed by: NURSE PRACTITIONER

## 2020-02-12 ASSESSMENT — ENCOUNTER SYMPTOMS
CONSTIPATION: 0
BACK PAIN: 1
SHORTNESS OF BREATH: 0

## 2020-02-12 NOTE — PROGRESS NOTES
Subjective:      Patient ID: Breanna Adkins is a 54 y.o. female. Chief Complaint   Patient presents with    Follow-up     post SIJ injections, discuss work restriction note      HPI Underwent sacroiliac joint injections x2. Intermittent pain relief, intermittent sleep disturbance. Has not been two weeks yet since her second injections. Questioning if lifting restriction at work would benefit her. Pain Assessment  Location of Pain: Back  Location Modifiers: Left, Right, Inferior  Severity of Pain: 5  Quality of Pain: Dull, Aching, Throbbing  Duration of Pain: Persistent  Frequency of Pain: Constant  Aggravating Factors: Straightening, Standing, Squatting, Other (Comment)(sitting)  Limiting Behavior: Yes  Result of Injury: No  Work-Related Injury: No  Are there other pain locations you wish to document?: No    Allergies   Allergen Reactions    Mucinex [Guaifenesin Er] Other (See Comments)     Gerd/heartburn       No outpatient medications have been marked as taking for the 2/12/20 encounter (Office Visit) with ELISSA Kuo CNP. Past Medical History:   Diagnosis Date    Anxiety     Chronic low back pain     Disabling.  Dyshidrotic eczema     Of a couple of toes.  Dysmenorrhea     Endometritis     Ingrown toenail     Painful, infected, left hallux, mycotic.  Menorrhagia     Peptic ulcer disease     Right shoulder pain     With consideration to impingement and rotator cuff syndrome.  Tobacco abuse     Toenail fungus     Developing, right hallux. Past Surgical History:   Procedure Laterality Date    APPENDECTOMY      CHOLECYSTECTOMY  01/17/1997    DILATION AND CURETTAGE OF UTERUS      ERCP  01/14/1997    With endoscopic retrograde sphincterotomy and common bile duct stone extraction.     [facility] [place] [facility]. INJECTION PROCEDURE FOR SACROILIAC JOINT Bilateral 1/24/2020    Bilateral SACROILIAC & Piriformis INJ's performed by Sultana Mendoza MD at [address]

## 2020-03-26 ENCOUNTER — TELEPHONE (OUTPATIENT)
Dept: PAIN MANAGEMENT | Age: 50
End: 2020-03-26

## 2020-03-26 NOTE — TELEPHONE ENCOUNTER
Please call patient and see if she wants to do a video visit or if she wants to come in.   Pt would need to be seen due to her being on restrictions at work

## 2020-03-30 ENCOUNTER — VIRTUAL VISIT (OUTPATIENT)
Dept: PAIN MANAGEMENT | Age: 50
End: 2020-03-30
Payer: COMMERCIAL

## 2020-03-30 PROCEDURE — 99213 OFFICE O/P EST LOW 20 MIN: CPT | Performed by: NURSE PRACTITIONER

## 2020-03-30 ASSESSMENT — ENCOUNTER SYMPTOMS
BACK PAIN: 1
SHORTNESS OF BREATH: 0
CONSTIPATION: 0

## 2020-03-30 NOTE — PROGRESS NOTES
Eyes:  EOM    [x]  Normal  [] Abnormal-  Sclera  [x]  Normal  [] Abnormal -         Discharge [x]  None visible  [] Abnormal -    HENT:   [x] Normocephalic, atraumatic. [] Abnormal   [] Mouth/Throat: Mucous membranes are moist.     External Ears [x] Normal  [] Abnormal-     Neck: [x] No visualized mass     Pulmonary/Chest: [x] Respiratory effort normal.  [x] No visualized signs of difficulty breathing or respiratory distress        [] Abnormal-      Musculoskeletal:   [x] Normal gait with no signs of ataxia         [x] Normal range of motion of neck        [] Abnormal-       Neurological:        [x] No Facial Asymmetry (Cranial nerve 7 motor function) (limited exam to video visit)          [x] No gaze palsy        [] Abnormal-         Skin:        [x] No significant exanthematous lesions or discoloration noted on facial skin         [] Abnormal-            Psychiatric:       [x] Normal Affect [] No Hallucinations        [] Abnormal-     Other pertinent observable physical exam findings-     Due to this being a TeleHealth encounter, evaluation of the following organ systems is limited: Vitals/Constitutional/EENT/Resp/CV/GI//MS/Neuro/Skin/Heme-Lymph-Imm. ASSESSMENT/PLAN:  1. Pain of both sacroiliac joints    2. Piriformis syndrome, right    3. Piriformis syndrome, left          Return in about 6 weeks (around 5/11/2020). Pursue dry needling as previously planned, will evaluate when completed in approximately 6 weeks  An  electronic signature was used to authenticate this note.     --ELISSA Kuo - CNP on 3/30/2020 at 11:25 AM        Pursuant to the emergency declaration under the [facility], 1135 waiver authority and the [employer] Act, this Virtual  Visit was conducted, with patient's consent, to reduce the patient's risk of exposure to COVID-19 and provide continuity of care for an established patient. Services were provided through a video synchronous discussion virtually to substitute for in-person clinic visit.

## 2020-04-20 ENCOUNTER — PATIENT MESSAGE (OUTPATIENT)
Dept: FAMILY MEDICINE CLINIC | Age: 50
End: 2020-04-20

## 2020-04-21 NOTE — TELEPHONE ENCOUNTER
From: Roz Collier  To: CHARLY Carlin  Sent: 4/20/2020 9:40 PM EDT  Subject: Non-Urgent Medical Question    I was wondering if you was taking appointments. Lauree Grade Lauree Grade I'm still having issues with my back and legs. Jean Carlos Reyes

## 2020-04-24 ENCOUNTER — OFFICE VISIT (OUTPATIENT)
Dept: PRIMARY CARE CLINIC | Age: 50
End: 2020-04-24
Payer: COMMERCIAL

## 2020-04-24 VITALS
TEMPERATURE: 97 F | SYSTOLIC BLOOD PRESSURE: 122 MMHG | HEART RATE: 77 BPM | HEIGHT: 69 IN | BODY MASS INDEX: 31.28 KG/M2 | DIASTOLIC BLOOD PRESSURE: 64 MMHG | WEIGHT: 211.2 LBS | RESPIRATION RATE: 18 BRPM | OXYGEN SATURATION: 99 %

## 2020-04-24 PROCEDURE — 99214 OFFICE O/P EST MOD 30 MIN: CPT | Performed by: FAMILY MEDICINE

## 2020-04-24 RX ORDER — ALPRAZOLAM 0.25 MG/1
0.25 TABLET ORAL 3 TIMES DAILY PRN
Qty: 8 TABLET | Refills: 0 | Status: SHIPPED | OUTPATIENT
Start: 2020-04-24 | End: 2020-04-27

## 2020-04-24 RX ORDER — BENZONATATE 100 MG/1
100-200 CAPSULE ORAL EVERY 8 HOURS PRN
Qty: 20 CAPSULE | Refills: 0 | Status: SHIPPED | OUTPATIENT
Start: 2020-04-24 | End: 2020-11-10 | Stop reason: ALTCHOICE

## 2020-04-24 RX ORDER — AZITHROMYCIN 250 MG/1
TABLET, FILM COATED ORAL
Qty: 1 PACKET | Refills: 0 | Status: SHIPPED | OUTPATIENT
Start: 2020-04-24 | End: 2020-04-29

## 2020-04-24 ASSESSMENT — ENCOUNTER SYMPTOMS
SORE THROAT: 1
NAUSEA: 1
RHINORRHEA: 1
COUGH: 1
SHORTNESS OF BREATH: 0
SINUS PRESSURE: 0
VOMITING: 0
DIARRHEA: 0
WHEEZING: 0

## 2020-04-24 ASSESSMENT — PATIENT HEALTH QUESTIONNAIRE - PHQ9
1. LITTLE INTEREST OR PLEASURE IN DOING THINGS: 0
SUM OF ALL RESPONSES TO PHQ QUESTIONS 1-9: 0
SUM OF ALL RESPONSES TO PHQ9 QUESTIONS 1 & 2: 0
2. FEELING DOWN, DEPRESSED OR HOPELESS: 0
SUM OF ALL RESPONSES TO PHQ QUESTIONS 1-9: 0

## 2020-04-24 NOTE — PROGRESS NOTES
diarrhea and vomiting. Musculoskeletal: Negative for myalgias. Neurological: Positive for headaches (x past 2-3 weeks; pain is \"all over\"; Tylenol helps, but only for a little bit). Psychiatric/Behavioral: The patient is nervous/anxious (increased due to current situation with Covid-19). Objective:     Vitals:    04/24/20 0935   BP: 122/64   Pulse: 77   Resp: 18   Temp: 97 °F (36.1 °C)   TempSrc: Tympanic   SpO2: 99%   Weight: 211 lb 3.2 oz (95.8 kg)   Height: 5' 9\" (1.753 m)     Physical Exam  Vitals signs and nursing note reviewed. Constitutional:       General: She is not in acute distress. Appearance: She is well-developed. HENT:      Head: Normocephalic and atraumatic. Right Ear: Tympanic membrane, ear canal and external ear normal.      Left Ear: Tympanic membrane, ear canal and external ear normal.      Nose: Mucosal edema present. Right Sinus: No maxillary sinus tenderness or frontal sinus tenderness. Left Sinus: No maxillary sinus tenderness or frontal sinus tenderness. Mouth/Throat:      Pharynx: Posterior oropharyngeal erythema (Mild) present. No oropharyngeal exudate. Eyes:      Conjunctiva/sclera: Conjunctivae normal.      Pupils: Pupils are equal, round, and reactive to light. Neck:      Musculoskeletal: Neck supple. Cardiovascular:      Rate and Rhythm: Normal rate and regular rhythm. Heart sounds: Normal heart sounds. Pulmonary:      Effort: Pulmonary effort is normal. No respiratory distress. Breath sounds: Normal breath sounds. No wheezing or rales. Abdominal:      General: Bowel sounds are normal. There is no distension. Palpations: Abdomen is soft. Tenderness: There is no abdominal tenderness. Lymphadenopathy:      Cervical: No cervical adenopathy. Neurological:      Mental Status: She is alert and oriented to person, place, and time. Psychiatric:         Mood and Affect: Mood is anxious (with tearfulness).

## 2020-04-27 ENCOUNTER — HOSPITAL ENCOUNTER (OUTPATIENT)
Dept: LAB | Age: 50
Discharge: HOME | End: 2020-04-27
Payer: COMMERCIAL

## 2020-04-27 ENCOUNTER — OFFICE VISIT (OUTPATIENT)
Dept: FAMILY MEDICINE CLINIC | Age: 50
End: 2020-04-27
Payer: COMMERCIAL

## 2020-04-27 VITALS
OXYGEN SATURATION: 98 % | RESPIRATION RATE: 16 BRPM | BODY MASS INDEX: 31.1 KG/M2 | HEART RATE: 76 BPM | DIASTOLIC BLOOD PRESSURE: 82 MMHG | HEIGHT: 69 IN | SYSTOLIC BLOOD PRESSURE: 136 MMHG | WEIGHT: 210 LBS

## 2020-04-27 LAB
FOLATE: 16.1 NG/ML
VITAMIN B-12: 341 PG/ML (ref 232–1245)

## 2020-04-27 PROCEDURE — 99214 OFFICE O/P EST MOD 30 MIN: CPT | Performed by: PHYSICIAN ASSISTANT

## 2020-04-27 PROCEDURE — 82746 ASSAY OF FOLIC ACID SERUM: CPT

## 2020-04-27 PROCEDURE — 36415 COLL VENOUS BLD VENIPUNCTURE: CPT

## 2020-04-27 PROCEDURE — 82607 VITAMIN B-12: CPT

## 2020-04-27 RX ORDER — CYCLOBENZAPRINE HCL 10 MG
10 TABLET ORAL 3 TIMES DAILY PRN
Qty: 21 TABLET | Refills: 0 | Status: SHIPPED | OUTPATIENT
Start: 2020-04-27 | End: 2020-05-07

## 2020-04-27 RX ORDER — PREDNISONE 20 MG/1
TABLET ORAL
Qty: 18 TABLET | Refills: 0 | Status: SHIPPED | OUTPATIENT
Start: 2020-04-27 | End: 2020-07-31 | Stop reason: ALTCHOICE

## 2020-04-27 RX ORDER — FUROSEMIDE 20 MG/1
20 TABLET ORAL DAILY
Qty: 30 TABLET | Refills: 3 | Status: SHIPPED | OUTPATIENT
Start: 2020-04-27 | End: 2021-05-05

## 2020-04-27 RX ORDER — CITALOPRAM 10 MG/1
10 TABLET ORAL DAILY
Qty: 30 TABLET | Refills: 3 | Status: SHIPPED | OUTPATIENT
Start: 2020-04-27 | End: 2021-01-14 | Stop reason: ALTCHOICE

## 2020-04-27 ASSESSMENT — ENCOUNTER SYMPTOMS
WHEEZING: 0
DIARRHEA: 0
COUGH: 0
SHORTNESS OF BREATH: 0
NAUSEA: 0
VOMITING: 0
BACK PAIN: 1

## 2020-04-27 ASSESSMENT — PATIENT HEALTH QUESTIONNAIRE - PHQ9
SUM OF ALL RESPONSES TO PHQ QUESTIONS 1-9: 0
SUM OF ALL RESPONSES TO PHQ9 QUESTIONS 1 & 2: 0
SUM OF ALL RESPONSES TO PHQ QUESTIONS 1-9: 0
1. LITTLE INTEREST OR PLEASURE IN DOING THINGS: 0
2. FEELING DOWN, DEPRESSED OR HOPELESS: 0

## 2020-04-27 NOTE — PROGRESS NOTES
No focal deficit present. Mental Status: She is alert and oriented to person, place, and time. Sensory: No sensory deficit. Motor: No weakness. Coordination: Coordination normal.      Gait: Gait normal.      Deep Tendon Reflexes: Reflexes normal.             Assessment & Plan:     1. Sacroiliitis (HCC)    - cyclobenzaprine (FLEXERIL) 10 MG tablet; Take 1 tablet by mouth 3 times daily as needed for Muscle spasms  Dispense: 21 tablet; Refill: 0  - predniSONE (DELTASONE) 20 MG tablet; One tab tid for 3 days then one tab bid for 3 days then one tab qd  Dispense: 18 tablet; Refill: 0  - furosemide (LASIX) 20 MG tablet; Take 1 tablet by mouth daily Prn edema  Dispense: 30 tablet; Refill: 3    2. Chronic bilateral low back pain with right-sided sciatica    - cyclobenzaprine (FLEXERIL) 10 MG tablet; Take 1 tablet by mouth 3 times daily as needed for Muscle spasms  Dispense: 21 tablet; Refill: 0  - predniSONE (DELTASONE) 20 MG tablet; One tab tid for 3 days then one tab bid for 3 days then one tab qd  Dispense: 18 tablet; Refill: 0  - furosemide (LASIX) 20 MG tablet; Take 1 tablet by mouth daily Prn edema  Dispense: 30 tablet; Refill: 3    3. Anxiety with depression    - citalopram (CELEXA) 10 MG tablet; Take 1 tablet by mouth daily  Dispense: 30 tablet;  Refill: 3    Coping mechanism  At the end of the visit we discussed her mood she is very angry and upset about the current pandemic and money she is the only person in the house working at this time  Follow up 2 weeks sooner if problems  Advised I do agree dry needling is a good idea once PT is available  Heat HEP   Answered her questions  Reviewed neurosurgery notes and MRI findings from last year      [place], [place]  4/27/2020 11:04 AM EDT    (Pleasenote that portions of this note were completed with a voice recognition program.Efforts were made to edit the dictations but occasionally words are mis-transcribed.)

## 2020-04-30 ENCOUNTER — PATIENT MESSAGE (OUTPATIENT)
Dept: FAMILY MEDICINE CLINIC | Age: 50
End: 2020-04-30

## 2020-04-30 NOTE — TELEPHONE ENCOUNTER
From: Soraya Quick  To: CHARLY Fowler  Sent: 2020 1:16 PM EDT  Subject: Non-Urgent Medical Question    Hi    . I'm was approved again by my work for [employer] for my on going back issues and my legs and there faxing over papers. . I was on it for six months and it  this month. The FMLA is for when I need to take a day off when I have really bad days cause of my back and leg issue like the last FMLA was. Do I need to be seen in the office since I saw you on Monday and I have a follow up appointment in three weeks.     Thank you Alison Moore

## 2020-05-15 ENCOUNTER — OFFICE VISIT (OUTPATIENT)
Dept: FAMILY MEDICINE CLINIC | Age: 50
End: 2020-05-15
Payer: COMMERCIAL

## 2020-05-15 VITALS
WEIGHT: 207 LBS | OXYGEN SATURATION: 99 % | SYSTOLIC BLOOD PRESSURE: 124 MMHG | RESPIRATION RATE: 16 BRPM | HEIGHT: 69 IN | BODY MASS INDEX: 30.66 KG/M2 | HEART RATE: 69 BPM | DIASTOLIC BLOOD PRESSURE: 64 MMHG

## 2020-05-15 LAB — S PYO AG THROAT QL: NORMAL

## 2020-05-15 PROCEDURE — 87880 STREP A ASSAY W/OPTIC: CPT | Performed by: PHYSICIAN ASSISTANT

## 2020-05-15 PROCEDURE — 99214 OFFICE O/P EST MOD 30 MIN: CPT | Performed by: PHYSICIAN ASSISTANT

## 2020-05-15 RX ORDER — OMEPRAZOLE 40 MG/1
40 CAPSULE, DELAYED RELEASE ORAL DAILY
Qty: 30 CAPSULE | Refills: 3 | Status: SHIPPED | OUTPATIENT
Start: 2020-05-15 | End: 2021-05-05

## 2020-05-15 RX ORDER — ALPRAZOLAM 0.5 MG/1
0.25 TABLET ORAL 2 TIMES DAILY PRN
Qty: 30 TABLET | Refills: 1 | Status: SHIPPED | OUTPATIENT
Start: 2020-05-15 | End: 2021-05-05

## 2020-05-15 ASSESSMENT — PATIENT HEALTH QUESTIONNAIRE - PHQ9
SUM OF ALL RESPONSES TO PHQ9 QUESTIONS 1 & 2: 0
SUM OF ALL RESPONSES TO PHQ QUESTIONS 1-9: 0
SUM OF ALL RESPONSES TO PHQ QUESTIONS 1-9: 0
2. FEELING DOWN, DEPRESSED OR HOPELESS: 0
1. LITTLE INTEREST OR PLEASURE IN DOING THINGS: 0

## 2020-05-15 ASSESSMENT — ENCOUNTER SYMPTOMS
NAUSEA: 0
WHEEZING: 0
COUGH: 0
CHEST TIGHTNESS: 0
SHORTNESS OF BREATH: 0
TROUBLE SWALLOWING: 0
VOMITING: 0
SORE THROAT: 0
RHINORRHEA: 1
BACK PAIN: 1
DIARRHEA: 0

## 2020-06-11 ENCOUNTER — OFFICE VISIT (OUTPATIENT)
Dept: PAIN MANAGEMENT | Age: 50
End: 2020-06-11
Payer: COMMERCIAL

## 2020-06-11 VITALS
BODY MASS INDEX: 30.07 KG/M2 | DIASTOLIC BLOOD PRESSURE: 66 MMHG | HEART RATE: 72 BPM | WEIGHT: 203 LBS | HEIGHT: 69 IN | SYSTOLIC BLOOD PRESSURE: 116 MMHG

## 2020-06-11 PROCEDURE — 99214 OFFICE O/P EST MOD 30 MIN: CPT | Performed by: NURSE PRACTITIONER

## 2020-06-11 ASSESSMENT — ENCOUNTER SYMPTOMS
SHORTNESS OF BREATH: 0
CONSTIPATION: 0
BACK PAIN: 1

## 2020-06-11 NOTE — PROGRESS NOTES
intubated. Skin:     General: Skin is warm and dry. Capillary Refill: Capillary refill takes less than 2 seconds. Coloration: Skin is not pale. Nails: There is no clubbing. Neurological:      Mental Status: She is alert and oriented to person, place, and time. GCS: GCS eye subscore is 4. GCS verbal subscore is 5. GCS motor subscore is 6. Cranial Nerves: No cranial nerve deficit. Psychiatric:         Speech: Speech normal.         Behavior: Behavior normal. Behavior is not agitated, aggressive, withdrawn or combative. Behavior is cooperative. Judgment: Judgment normal. Judgment is not impulsive or inappropriate. Assessment:      1. Chronic bilateral low back pain with bilateral sciatica    2. Pain of both sacroiliac joints    3. Piriformis syndrome, right    4. Piriformis syndrome, left          Plan:      Chronic pain diagnoses such as   1. Chronic bilateral low back pain with bilateral sciatica    2. Pain of both sacroiliac joints    3. Piriformis syndrome, right    4. Piriformis syndrome, left     controlled on current medication regime, wll continue current pain medications to improve quality of life and function.    Consider dry needling when she has time or pain flares, she is to call when ready to pursue        ELISSA Arcos - CNP

## 2020-07-31 ENCOUNTER — OFFICE VISIT (OUTPATIENT)
Dept: FAMILY MEDICINE CLINIC | Age: 50
End: 2020-07-31
Payer: COMMERCIAL

## 2020-07-31 VITALS
TEMPERATURE: 96.3 F | HEIGHT: 69 IN | DIASTOLIC BLOOD PRESSURE: 68 MMHG | HEART RATE: 62 BPM | BODY MASS INDEX: 30.36 KG/M2 | SYSTOLIC BLOOD PRESSURE: 118 MMHG | WEIGHT: 205 LBS

## 2020-07-31 PROCEDURE — 99214 OFFICE O/P EST MOD 30 MIN: CPT | Performed by: PHYSICIAN ASSISTANT

## 2020-07-31 ASSESSMENT — ENCOUNTER SYMPTOMS
SHORTNESS OF BREATH: 0
WHEEZING: 0
BACK PAIN: 1
NAUSEA: 0
DIARRHEA: 0
CONSTIPATION: 0
COUGH: 0
CHEST TIGHTNESS: 0
VOMITING: 0

## 2020-07-31 ASSESSMENT — PATIENT HEALTH QUESTIONNAIRE - PHQ9
SUM OF ALL RESPONSES TO PHQ QUESTIONS 1-9: 0
1. LITTLE INTEREST OR PLEASURE IN DOING THINGS: 0
2. FEELING DOWN, DEPRESSED OR HOPELESS: 0
SUM OF ALL RESPONSES TO PHQ9 QUESTIONS 1 & 2: 0
SUM OF ALL RESPONSES TO PHQ QUESTIONS 1-9: 0

## 2020-07-31 NOTE — PROGRESS NOTES
[facility]    Subjective:      Patient ID: Radhika Ward is a 52 y.o. y.o. female. Patient is seen for 2 months follow up. She states she has been feeling a vibration type feeling in left medial gluteal area. She never went to PT when she does go prefers [employer].  Sees pain management every 3 months. Has an upcoming appointment. States can not go to PT right now due to overtime. Pain wise she is about the same. She states has 3 good days out of 4. Not missing work. Denies recent illness. Past Medical History:   Diagnosis Date    Anxiety     Chronic low back pain     Disabling.  Dyshidrotic eczema     Of a couple of toes.  Dysmenorrhea     Endometritis     Ingrown toenail     Painful, infected, left hallux, mycotic.  Menorrhagia     Peptic ulcer disease     Right shoulder pain     With consideration to impingement and rotator cuff syndrome.  Tobacco abuse     Toenail fungus     Developing, right hallux. Past Surgical History:   Procedure Laterality Date    APPENDECTOMY      CHOLECYSTECTOMY  01/17/1997    DILATION AND CURETTAGE OF UTERUS      ERCP  01/14/1997    With endoscopic retrograde sphincterotomy and common bile duct stone extraction.     [facility] [place]. INJECTION PROCEDURE FOR SACROILIAC JOINT Bilateral 1/24/2020    Bilateral SACROILIAC & Piriformis INJ's performed by Anitha Bruce MD at [address] Bilateral 2/7/2020    Bilateral SACROILIAC & Piriformis INJ's performed by Anitha Bruce MD at [address] TRUNK,ARM,LEG Right 2/15/2018    Excision Lesion Sebaceous Cyst Right Shoulder performed by Merlin Bureau, MD at [address]  03/30/1999       Family History   Problem Relation Age of Onset    Depression Mother     Anxiety Disorder Mother     Breast Cancer Mother     Cancer Mother     Heart Disease Father     Cancer Father         Pancreatic cancer.  Diabetes Father     High Blood Pressure Father     Depression Brother     Cancer Maternal Grandmother         cervical    Kidney Disease Maternal Grandmother     Other Paternal Grandmother         alzhiemers    Other Son         hydrocephaly    Mental Illness Daughter     Glaucoma Neg Hx     Cataracts Neg Hx        Allergies   Allergen Reactions    Mucinex [Guaifenesin Er] Other (See Comments)     Gerd/heartburn       Current Outpatient Medications   Medication Sig Dispense Refill    omeprazole (PRILOSEC) 40 MG delayed release capsule Take 1 capsule by mouth daily 30 capsule 3    citalopram (CELEXA) 10 MG tablet Take 1 tablet by mouth daily 30 tablet 3    meloxicam (MOBIC) 15 MG tablet take 1 tablet by mouth once daily 30 tablet 3    furosemide (LASIX) 20 MG tablet Take 1 tablet by mouth daily Prn edema (Patient not taking: Reported on 7/31/2020) 30 tablet 3    benzonatate (TESSALON) 100 MG capsule Take 1-2 capsules by mouth every 8 hours as needed for Cough (Patient not taking: Reported on 7/31/2020) 20 capsule 0    diclofenac sodium 1 % GEL Apply 2 g topically 4 times daily (Patient not taking: Reported on 7/31/2020) 1 Tube 0     No current facility-administered medications for this visit. Review of Systems   Constitutional: Positive for activity change. Negative for appetite change, chills, fatigue and fever. Activity varies with her pain. HENT:        Was having a left ear ache slept with fan one day and had pain on the left face. It is resolved now. Took motrin it helped. Respiratory: Negative for cough, chest tightness, shortness of breath and wheezing. Cardiovascular: Negative for chest pain and palpitations. Gastrointestinal: Negative for constipation, diarrhea, nausea and vomiting. Genitourinary: Negative for difficulty urinating. Musculoskeletal: Positive for arthralgias, back pain, myalgias, neck pain and neck stiffness.         Only limps only if legs are bothering her nl the left is worse. She refused to do injections with Dr. Theo Mitchell. It was too expensive and they did not provide long term relief. Skin: Negative for rash. Neurological: Positive for weakness and numbness. Negative for dizziness, light-headedness and headaches. Psychiatric/Behavioral: Positive for sleep disturbance. The patient is not nervous/anxious. Sleeps well 2 days out of 5. Objective:      /68 (Site: Right Upper Arm, Position: Sitting, Cuff Size: Medium Adult)   Pulse 62   Temp 96.3 °F (35.7 °C)   Ht 5' 9\" (1.753 m)   Wt 205 lb (93 kg)   BMI 30.27 kg/m²     Physical Exam  Vitals signs and nursing note reviewed. Constitutional:       General: She is not in acute distress. Appearance: Normal appearance. She is well-developed. She is not ill-appearing. HENT:      Head: Normocephalic and atraumatic. Right Ear: Tympanic membrane, ear canal and external ear normal.      Left Ear: Tympanic membrane, ear canal and external ear normal.      Nose: Nose normal. No congestion or rhinorrhea. Mouth/Throat:      Mouth: Mucous membranes are moist.      Pharynx: No oropharyngeal exudate or posterior oropharyngeal erythema. Eyes:      General: No scleral icterus. Conjunctiva/sclera: Conjunctivae normal.   Neck:      Musculoskeletal: Normal range of motion and neck supple. No neck rigidity or muscular tenderness. Thyroid: No thyroid mass, thyromegaly or thyroid tenderness. Vascular: No carotid bruit. Cardiovascular:      Rate and Rhythm: Normal rate and regular rhythm. Heart sounds: Normal heart sounds. No murmur. No gallop. Pulmonary:      Effort: Pulmonary effort is normal.      Breath sounds: Normal breath sounds. No wheezing, rhonchi or rales. Abdominal:      General: Bowel sounds are normal. There is no distension. Palpations: Abdomen is soft. There is no mass. Tenderness: There is no abdominal tenderness. There is no guarding or rebound. Hernia: No hernia is present. Musculoskeletal:         General: Tenderness present. No swelling, deformity or signs of injury. Comments: Had pain with palpation over the LS spine and the posterior SI joints. Complains of pain with palpation over the left gluteal area. Lymphadenopathy:      Cervical: No cervical adenopathy. Skin:     General: Skin is warm and dry. Findings: No erythema or rash. Neurological:      General: No focal deficit present. Mental Status: She is alert and oriented to person, place, and time. Cranial Nerves: No cranial nerve deficit. Sensory: No sensory deficit. Motor: No weakness. Gait: Gait normal.   Psychiatric:         Mood and Affect: Mood normal.         Behavior: Behavior normal.         Thought Content: Thought content normal.         Judgment: Judgment normal.           Assessment & Plan:     1. Sacroiliitis ([address]    - Pelon Zuleta MD, Rheumatology, [place]    2. Chronic pain syndrome    - MADDI - Diogo Snow MD, Rheumatology, [place]    3. Bilateral leg pain    - MADDI Sonw MD, Rheumatology, [place]    4. Chronic bilateral low back pain with bilateral sciatica    - Pelon Zuleta MD, Rheumatology, [place]    5. Leg weakness, bilateral    - MADDI - Diogo Snow MD, Rheumatology, [place]    6. Arthralgia, unspecified joint    - MADDI - Diogo Snow MD, Rheumatology, [place]    7. Osteoarthritis of carpometacarpal joints of both thumbs, unspecified osteoarthritis type    - Pelon Zuleta MD, Rheumatology, [place]    8. Encounter for screening mammogram for breast cancer    - NAGI DIGITAL SCREEN W OR WO CAD BILATERAL; Future    9. Abnormal laboratory test    - Vitamin B12 & Folate;  Future    Pap due 2021  Send to rheumatology to r/o fibromyalgia  Handout for [facility] given to patient to fill out and return to me  Answered her questions  Heat ice biofreeze  HEP   Fall prevention  Follow up one month sooner if problems  She will be notified of all results  Further treatment based on results    Iggy Isbell  8/20/2020 4:19 PM EDT    (Pleasenote that portions of this note were completed with a voice recognition program.Efforts were made to edit the dictations but occasionally words are mis-transcribed.)

## 2020-08-27 ENCOUNTER — OFFICE VISIT (OUTPATIENT)
Dept: FAMILY MEDICINE CLINIC | Age: 50
End: 2020-08-27
Payer: COMMERCIAL

## 2020-08-27 VITALS
SYSTOLIC BLOOD PRESSURE: 108 MMHG | BODY MASS INDEX: 30.07 KG/M2 | DIASTOLIC BLOOD PRESSURE: 78 MMHG | HEART RATE: 76 BPM | HEIGHT: 69 IN | TEMPERATURE: 97.8 F | WEIGHT: 203 LBS

## 2020-08-27 PROCEDURE — 99213 OFFICE O/P EST LOW 20 MIN: CPT | Performed by: PHYSICIAN ASSISTANT

## 2020-08-27 ASSESSMENT — ENCOUNTER SYMPTOMS
CHEST TIGHTNESS: 0
VOMITING: 0
BACK PAIN: 1
SHORTNESS OF BREATH: 0
COUGH: 0
DIARRHEA: 0
NAUSEA: 0
WHEEZING: 0

## 2020-08-27 NOTE — PROGRESS NOTES
[facility]    Subjective:      Patient ID: Radhika Ward is a 52 y.o. y.o. female. Patient is seen today for follow up on back and other issues. She has appt with arthritis clinic next month. 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 She went home could not tolerate the pain. Her hands really bother her too sees ortho too gets injections. Has a lesion right frontal area. No itching it increased in size. No tx. No pain. It has been present one month but increased the past few weeks. No flaky. Past Medical History:   Diagnosis Date    Anxiety     Chronic low back pain     Disabling.  Dyshidrotic eczema     Of a couple of toes.  Dysmenorrhea     Endometritis     Ingrown toenail     Painful, infected, left hallux, mycotic.  Menorrhagia     Peptic ulcer disease     Right shoulder pain     With consideration to impingement and rotator cuff syndrome.  Tobacco abuse     Toenail fungus     Developing, right hallux. Past Surgical History:   Procedure Laterality Date    APPENDECTOMY      CHOLECYSTECTOMY  01/17/1997    DILATION AND CURETTAGE OF UTERUS      ERCP  01/14/1997    With endoscopic retrograde sphincterotomy and common bile duct stone extraction.     [facility]. INJECTION PROCEDURE FOR SACROILIAC JOINT Bilateral 1/24/2020    Bilateral SACROILIAC & Piriformis INJ's performed by Anitha Bruce MD at [address] Bilateral 2/7/2020    Bilateral SACROILIAC & Piriformis INJ's performed by Anitha Bruce MD at [address] TRUNK,ARM,LEG Right 2/15/2018    Excision Lesion Sebaceous Cyst Right Shoulder performed by Merlin Bureau, MD at [facility]  03/30/1999       Family History   Problem Relation Age of Onset    Depression Mother     Anxiety Disorder Mother     Breast Cancer Mother     Cancer Mother     Heart Disease Father     Cancer pain, joint swelling and myalgias. Skin: Negative for rash. Neurological: Negative for dizziness, light-headedness, numbness and headaches. Psychiatric/Behavioral: Negative for sleep disturbance. The patient is not nervous/anxious. Sleep is not too bad. Objective:      /78 (Site: Right Upper Arm, Position: Sitting, Cuff Size: Medium Adult)   Pulse 76   Temp 97.8 °F (36.6 °C)   Ht 5' 9\" (1.753 m)   Wt 203 lb (92.1 kg)   BMI 29.98 kg/m²     Physical Exam  Vitals signs and nursing note reviewed. Constitutional:       General: She is not in acute distress. Appearance: Normal appearance. She is well-developed. She is not ill-appearing. HENT:      Head: Normocephalic and atraumatic. Right Ear: Tympanic membrane, ear canal and external ear normal.      Left Ear: Tympanic membrane, ear canal and external ear normal.      Nose: Nose normal. No congestion or rhinorrhea. Mouth/Throat:      Mouth: Mucous membranes are moist.      Pharynx: No oropharyngeal exudate or posterior oropharyngeal erythema. Eyes:      General: No scleral icterus. Conjunctiva/sclera: Conjunctivae normal.   Neck:      Musculoskeletal: Normal range of motion and neck supple. No neck rigidity or muscular tenderness. Vascular: No carotid bruit. Cardiovascular:      Rate and Rhythm: Normal rate and regular rhythm. Heart sounds: Normal heart sounds. No murmur. No gallop. Pulmonary:      Effort: Pulmonary effort is normal.      Breath sounds: Normal breath sounds. No wheezing, rhonchi or rales. Abdominal:      General: Bowel sounds are normal. There is no distension. Palpations: Abdomen is soft. There is no mass. Tenderness: There is no abdominal tenderness. There is no guarding or rebound. Hernia: No hernia is present. Musculoskeletal:         General: Tenderness present. No swelling. Right lower leg: No edema. Left lower leg: No edema.    Lymphadenopathy: Cervical: No cervical adenopathy. Skin:     General: Skin is warm and dry. Findings: No erythema or rash. Comments: She has a small circular papular lesion on the right frontal area more laterally. Slight telangiectasia noted. Lesion is about 6 to 7 mm in size. Neurological:      General: No focal deficit present. Mental Status: She is alert and oriented to person, place, and time. Sensory: No sensory deficit. Gait: Gait normal.   Psychiatric:         Mood and Affect: Mood normal.         Behavior: Behavior normal.         Thought Content: Thought content normal.         Judgment: Judgment normal.           Assessment & Plan:     1. Skin lesion of face    - External Referral To Dermatology    2. Sacroiliitis ([address]      3. Arthralgia, unspecified joint      4. Generalized anxiety disorder      5. Bilateral sacroiliitis ([address]      6. Osteoarthritis of carpometacarpal joints of both thumbs, unspecified osteoarthritis type      7.  Chronic pain syndrome      Answered her questions  Based on the skin lesion on the right frontal area will refer to dermatology to rule out basal cell also to get skin screen  Follow-up with Ortho and pain management  Take her medications as prescribed  Stretching heat ice Biofreeze  Follow-up 3 months sooner if problems  Recommend a flu shot this fall    Reba Telles, [address]  9/8/2020 12:03 PM EDT    (Pleasenote that portions of this note were completed with a voice recognition program.Efforts were made to edit the dictations but occasionally words are mis-transcribed.)

## 2020-09-09 LAB
ALBUMIN SERPL-MCNC: 4.2 G/DL
ALP BLD-CCNC: 78 U/L
ALT SERPL-CCNC: 10 U/L
ANION GAP SERPL CALCULATED.3IONS-SCNC: NORMAL MMOL/L
AST SERPL-CCNC: 18 U/L
BASOPHILS ABSOLUTE: 0.04 /ΜL
BASOPHILS RELATIVE PERCENT: 0.5 %
BILIRUB SERPL-MCNC: 0.8 MG/DL (ref 0.1–1.4)
BUN BLDV-MCNC: 13 MG/DL
CALCIUM SERPL-MCNC: 9.3 MG/DL
CHLORIDE BLD-SCNC: 103 MMOL/L
CO2: 28 MMOL/L
CREAT SERPL-MCNC: 0.58 MG/DL
EOSINOPHILS ABSOLUTE: 0.09 /ΜL
EOSINOPHILS RELATIVE PERCENT: 1.2 %
GFR CALCULATED: 111
GLUCOSE BLD-MCNC: 90 MG/DL
HCT VFR BLD CALC: 36.7 % (ref 36–46)
HEMOGLOBIN: 12.2 G/DL (ref 12–16)
LYMPHOCYTES ABSOLUTE: 2.74 /ΜL
LYMPHOCYTES RELATIVE PERCENT: 36.6 %
MCH RBC QN AUTO: 31.6 PG
MCHC RBC AUTO-ENTMCNC: 33.2 G/DL
MCV RBC AUTO: 95.3 FL
MONOCYTES ABSOLUTE: 0.52 /ΜL
MONOCYTES RELATIVE PERCENT: 6.9 %
NEUTROPHILS ABSOLUTE: 4.08 /ΜL
NEUTROPHILS RELATIVE PERCENT: NORMAL
PDW BLD-RTO: NORMAL %
PLATELET # BLD: 250 K/ΜL
PMV BLD AUTO: NORMAL FL
POTASSIUM SERPL-SCNC: 4.1 MMOL/L
RBC # BLD: 9.86 10^6/ΜL
SODIUM BLD-SCNC: 139 MMOL/L
TOTAL PROTEIN: 7
TSH SERPL DL<=0.05 MIU/L-ACNC: 2.65 UIU/ML
VITAMIN D 25-HYDROXY: 30.5
VITAMIN D2, 25 HYDROXY: NORMAL
VITAMIN D3,25 HYDROXY: NORMAL
WBC # BLD: 7.49 10^3/ML

## 2020-10-09 ENCOUNTER — OFFICE VISIT (OUTPATIENT)
Dept: PRIMARY CARE CLINIC | Age: 50
End: 2020-10-09
Payer: COMMERCIAL

## 2020-10-09 ENCOUNTER — HOSPITAL ENCOUNTER (OUTPATIENT)
Age: 50
Setting detail: SPECIMEN
Discharge: HOME | End: 2020-10-09
Payer: COMMERCIAL

## 2020-10-09 VITALS
HEART RATE: 76 BPM | HEIGHT: 69 IN | TEMPERATURE: 98.3 F | OXYGEN SATURATION: 99 % | WEIGHT: 212 LBS | SYSTOLIC BLOOD PRESSURE: 124 MMHG | DIASTOLIC BLOOD PRESSURE: 78 MMHG | RESPIRATION RATE: 18 BRPM | BODY MASS INDEX: 31.4 KG/M2

## 2020-10-09 PROCEDURE — U0003 INFECTIOUS AGENT DETECTION BY NUCLEIC ACID (DNA OR RNA); SEVERE ACUTE RESPIRATORY SYNDROME CORONAVIRUS 2 (SARS-COV-2) (CORONAVIRUS DISEASE [COVID-19]), AMPLIFIED PROBE TECHNIQUE, MAKING USE OF HIGH THROUGHPUT TECHNOLOGIES AS DESCRIBED BY CMS-2020-01-R: HCPCS

## 2020-10-09 PROCEDURE — 99213 OFFICE O/P EST LOW 20 MIN: CPT | Performed by: NURSE PRACTITIONER

## 2020-10-09 ASSESSMENT — PATIENT HEALTH QUESTIONNAIRE - PHQ9
SUM OF ALL RESPONSES TO PHQ QUESTIONS 1-9: 0
SUM OF ALL RESPONSES TO PHQ QUESTIONS 1-9: 0
2. FEELING DOWN, DEPRESSED OR HOPELESS: 0
SUM OF ALL RESPONSES TO PHQ9 QUESTIONS 1 & 2: 0
1. LITTLE INTEREST OR PLEASURE IN DOING THINGS: 0

## 2020-10-09 ASSESSMENT — ENCOUNTER SYMPTOMS
SINUS PRESSURE: 1
COUGH: 1
WHEEZING: 0
NAUSEA: 0
RHINORRHEA: 1
SHORTNESS OF BREATH: 0
VOMITING: 0
CHEST TIGHTNESS: 0
SORE THROAT: 0

## 2020-10-09 NOTE — LETTER
[facility]  [address] [facility] [address]  Phone: [phone]  Fax: [phone]    ELISSA Turner NP        October 9, 2020     Patient: Chucho Longoria   YOB: 1970   Date of Visit: 10/9/2020       To Whom it May Concern:    Dex Atkins was seen in my clinic on 10/9/2020. She may return to work after a negative covid test (expected 5-7 days) result, marked symptom improvement and fever free for 24 hours. .    If you have any questions or concerns, please don't hesitate to call.     Sincerely,         ELISSA Turner NP

## 2020-10-09 NOTE — PROGRESS NOTES
[facility]  [facility]. [place]  [place] [address]  Dept: [phone]  Dept Fax: [phone]  Loc: [facility]       Chief Complaint   Patient presents with    Headache     chills,sweats,fever,runny nose ,cough,body aches,started x 2 days daughter tested positive for covid       Nurses Notes reviewed and I agree except as noted in the HPI. HISTORY OF PRESENT ILLNESS   Brandon Frausto is a 52 y.o. female who presents to [facility] today (10/9/2020) for evaluation of:   Daughter recently positive for covid was symptomatic beginning Monday night. Pt lives with daughter, has been caring for her and cleaning her room for past several days. Headache    This is a new problem. Episode onset: onset 2 days ago. The problem occurs constantly. The problem has been unchanged. The pain is located in the frontal region. The pain quality is similar to prior headaches. The quality of the pain is described as aching. The pain is at a severity of 5/10. Associated symptoms include coughing, a fever (unsure of actual temp), muscle aches, rhinorrhea and sinus pressure. Pertinent negatives include no dizziness, nausea, sore throat or vomiting. Associated symptoms comments: Congestion  . She has tried acetaminophen for the symptoms. The treatment provided mild relief. Cough   This is a new problem. The current episode started in the past 7 days (2 days ago). The problem has been gradually worsening. The problem occurs every few hours. The cough is non-productive. Associated symptoms include a fever (unsure of actual temp), headaches, myalgias, nasal congestion and rhinorrhea. Pertinent negatives include no chest pain, chills, sore throat, shortness of breath or wheezing. Treatments tried: tylenol. The treatment provided mild relief. There is no history of asthma, COPD, environmental allergies or pneumonia. REVIEW OF SYSTEMS     Review of Systems   Constitutional: Positive for fatigue and fever (unsure of actual temp). Negative for chills. HENT: Positive for congestion, rhinorrhea and sinus pressure. Negative for sore throat. Respiratory: Positive for cough. Negative for chest tightness, shortness of breath and wheezing. Cardiovascular: Negative for chest pain. Gastrointestinal: Negative for nausea and vomiting. Musculoskeletal: Positive for myalgias. Allergic/Immunologic: Negative for environmental allergies. Neurological: Positive for headaches. Negative for dizziness. PAST MEDICAL HISTORY         Diagnosis Date    Anxiety     Chronic low back pain     Disabling.  Dyshidrotic eczema     Of a couple of toes.  Dysmenorrhea     Endometritis     Ingrown toenail     Painful, infected, left hallux, mycotic.  Menorrhagia     Peptic ulcer disease     Right shoulder pain     With consideration to impingement and rotator cuff syndrome.  Tobacco abuse     Toenail fungus     Developing, right hallux. SURGICAL HISTORY     Patient  has a past surgical history that includes Cholecystectomy (01/17/1997); Dilation and curettage of uterus; Tubal ligation (03/30/1999); Appendectomy; ERCP (01/14/1997); pr exc skin benig 0.6-1cm trunk,arm,leg (Right, 2/15/2018); Injection Procedure For Sacroiliac Joint (Bilateral, 1/24/2020); and Injection Procedure For Sacroiliac Joint (Bilateral, 2/7/2020).     CURRENT MEDICATIONS       Outpatient Medications Prior to Visit   Medication Sig Dispense Refill    citalopram (CELEXA) 10 MG tablet Take 1 tablet by mouth daily 30 tablet 3    meloxicam (MOBIC) 15 MG tablet take 1 tablet by mouth once daily 30 tablet 3    diclofenac sodium 1 % GEL Apply 2 g topically 4 times daily 1 Tube 0    omeprazole (PRILOSEC) 40 MG delayed release capsule Take 1 capsule by mouth daily (Patient not taking: Reported on 10/9/2020) 30 capsule 3    furosemide (LASIX) 20 MG tablet Take 1 tablet by mouth daily Prn edema (Patient not taking: Reported on 7/31/2020) 30 tablet 3    benzonatate (TESSALON) 100 MG capsule Take 1-2 capsules by mouth every 8 hours as needed for Cough (Patient not taking: Reported on 7/31/2020) 20 capsule 0     No facility-administered medications prior to visit. ALLERGIES     Patient is is allergic to mucinex [guaifenesin er]. FAMILY HISTORY     Patient's family history includes Anxiety Disorder in her mother; Breast Cancer in her mother; Cancer in her father, maternal grandmother, and mother; Depression in her brother and mother; Diabetes in her father; Heart Disease in her father; High Blood Pressure in her father; Kidney Disease in her maternal grandmother; Mental Illness in her daughter; Other in her paternal grandmother and son. SOCIAL HISTORY     Patient  reports that she has been smoking cigarettes. She has a 10.00 pack-year smoking history. She has never used smokeless tobacco. She reports that she does not drink alcohol or use drugs. PHYSICAL EXAM     VITALS  BP: 124/78, Temp: 98.3 °F (36.8 °C), Pulse: 76, Resp: 18, SpO2: 99 %  Physical Exam  Vitals signs reviewed. Constitutional:       General: She is not in acute distress. Appearance: She is not toxic-appearing. HENT:      Right Ear: Tympanic membrane and ear canal normal.      Left Ear: Tympanic membrane and ear canal normal.      Nose: Congestion present. Mouth/Throat:      Mouth: Mucous membranes are moist.      Pharynx: Oropharynx is clear. No oropharyngeal exudate or posterior oropharyngeal erythema. Eyes:      Pupils: Pupils are equal, round, and reactive to light. Neck:      Musculoskeletal: Normal range of motion. Cardiovascular:      Rate and Rhythm: Normal rate and regular rhythm. Heart sounds: Normal heart sounds. No murmur. Pulmonary:      Effort: Pulmonary effort is normal. No respiratory distress. Breath sounds: Normal breath sounds.  No wheezing or rhonchi. Lymphadenopathy:      Cervical: Cervical adenopathy (bilateral palpable nodes, nontender) present. Skin:     General: Skin is warm and dry. Capillary Refill: Capillary refill takes less than 2 seconds. Neurological:      General: No focal deficit present. Mental Status: She is alert. Gait: Gait normal.         DIAGNOSTIC RESULTS   Labs:No results found for this visit on 10/09/20. IMAGING:        CLINICAL COURSE:     Vitals:    10/09/20 1432   BP: 124/78   Pulse: 76   Resp: 18   Temp: 98.3 °F (36.8 °C)   TempSrc: Tympanic   SpO2: 99%   Weight: 212 lb (96.2 kg)   Height: 5' 9\" (1.753 m)           PROCEDURES:  None  FINAL IMPRESSION      1. Viral upper respiratory tract infection    2. Person under investigation for COVID-19    3. Nonintractable headache, unspecified chronicity pattern, unspecified headache type    4. Fever, unspecified fever cause    5. Congestion of nasal sinus    6. Cough         DISPOSITION/PLAN     Patient Instructions     Increase fluids. Tylenol and ibuprofen for fever/body aches. Return if symptoms worsen. Will notify pt of covid test result. Pt should isolate at home away in an area away from family and quarantine at home until result is available. Work note provided. Patient Education        Upper Respiratory Infection (Cold): Care Instructions  Your Care Instructions     An upper respiratory infection, or URI, is an infection of the nose, sinuses, or throat. URIs are spread by coughs, sneezes, and direct contact. The common cold is the most frequent kind of URI. The flu and sinus infections are other kinds of URIs. Almost all URIs are caused by viruses. Antibiotics won't cure them. But you can treat most infections with home care. This may include drinking lots of fluids and taking over-the-counter pain medicine. You will probably feel better in 4 to 10 days. The doctor has checked you carefully, but problems can develop later.  If you notice any problems or new symptoms, get medical treatment right away. Follow-up care is a key part of your treatment and safety. Be sure to make and go to all appointments, and call your doctor if you are having problems. It's also a good idea to know your test results and keep a list of the medicines you take. How can you care for yourself at home? · To prevent dehydration, drink plenty of fluids, enough so that your urine is light yellow or clear like water. Choose water and other caffeine-free clear liquids until you feel better. If you have kidney, heart, or liver disease and have to limit fluids, talk with your doctor before you increase the amount of fluids you drink. · Take an over-the-counter pain medicine, such as acetaminophen (Tylenol), ibuprofen (Advil, Motrin), or naproxen (Aleve). Read and follow all instructions on the label. · Before you use cough and cold medicines, check the label. These medicines may not be safe for young children or for people with certain health problems. · Be careful when taking over-the-counter cold or flu medicines and Tylenol at the same time. Many of these medicines have acetaminophen, which is Tylenol. Read the labels to make sure that you are not taking more than the recommended dose. Too much acetaminophen (Tylenol) can be harmful. · Get plenty of rest.  · Do not smoke or allow others to smoke around you. If you need help quitting, talk to your doctor about stop-smoking programs and medicines. These can increase your chances of quitting for good. When should you call for help? Call 911 anytime you think you may need emergency care. For example, call if:    · You have severe trouble breathing. Call your doctor now or seek immediate medical care if:    · You seem to be getting much sicker.     · You have new or worse trouble breathing.     · You have a new or higher fever.     · You have a new rash.    Watch closely for changes in your health, and be sure you are having problems. It's also a good idea to know your test results and keep a list of the medicines you take. How can you care for yourself at home? · Get extra rest. It can help you feel better. · Drink plenty of fluids. This helps replace fluids lost from fever. Fluids also help ease a scratchy throat. Water, soup, fruit juice, and hot tea with lemon are good choices. · Take acetaminophen (such as Tylenol) to reduce a fever. It may also help with muscle aches. Read and follow all instructions on the label. · Use petroleum jelly on sore skin. This can help if the skin around your nose and lips becomes sore from rubbing a lot with tissues. Tips for self-isolation  · Limit contact with people in your home. If possible, stay in a separate bedroom and use a separate bathroom. · Wear a cloth face cover when you are around other people. It can help stop the spread of the virus when you cough or sneeze. · If you have to leave home, avoid crowds and try to stay at least 6 feet away from other people. · Avoid contact with pets and other animals. · Cover your mouth and nose with a tissue when you cough or sneeze. Then throw it in the trash right away. · Wash your hands often, especially after you cough or sneeze. Use soap and water, and scrub for at least 20 seconds. If soap and water aren't available, use an alcohol-based hand . · Don't share personal household items. These include bedding, towels, cups and glasses, and eating utensils. · [address] in the warmest water allowed for the fabric type, and dry it completely. It's okay to wash other people's laundry with yours. · Clean and disinfect your home every day. Use household  and disinfectant wipes or sprays. Take special care to clean things that you grab with your hands. These include doorknobs, remote controls, phones, and handles on your refrigerator and microwave.  And don't forget countertops, tabletops, bathrooms, and computer keyboards. When you can end self-isolation  · If you know or suspect that you have COVID-19, stay in self-isolation until:  ? You haven't had a fever for 3 days, and  ? Your symptoms have improved, and  ? It's been at least 10 days since your symptoms started. · Talk to your doctor about whether you also need testing, especially if you have a weakened immune system. When should you call for help? Call 911 anytime you think you may need emergency care. For example, call if you have life-threatening symptoms, such as:    · You have severe trouble breathing. (You can't talk at all.)     · You have constant chest pain or pressure.     · You are severely dizzy or lightheaded.     · You are confused or can't think clearly.     · Your face and lips have a blue color.     · You pass out (lose consciousness) or are very hard to wake up. Call your doctor now or seek immediate medical care if:    · You have moderate trouble breathing. (You can't speak a full sentence.)     · You are coughing up blood (more than about 1 teaspoon).     · You have signs of low blood pressure. These include feeling lightheaded; being too weak to stand; and having cold, pale, clammy skin. Watch closely for changes in your health, and be sure to contact your doctor if:    · Your symptoms get worse.     · You are not getting better as expected. Call before you go to the doctor's office. Follow their instructions. And wear a cloth face cover. Current as of: July 10, 2020               Content Version: 12.6  © 2006-2020 [employer]. Care instructions adapted under license by [facility] ([facility]). If you have questions about a medical condition or this instruction, always ask your healthcare professional. [place] [address] any warranty or liability for your use of this information.            Orders Placed This Encounter   Procedures    COVID-19 Ambulatory     Standing Status:   Future     Number of Occurrences:   1     Standing Expiration Date:   10/9/2021     Scheduling Instructions:      Saline media preferred given current shortage of viral transport media but both acceptable     Order Specific Question:   Is this test for diagnosis or screening? Answer:   Diagnosis of ill patient     Order Specific Question:   Symptomatic for COVID-19 as defined by CDC? Answer:   Yes     Order Specific Question:   Date of Symptom Onset     Answer:   10/7/2020     Order Specific Question:   Hospitalized for COVID-19? Answer:   No     Order Specific Question:   Admitted to ICU for COVID-19? Answer:   No     Order Specific Question:   Employed in healthcare setting? Answer:   No     Order Specific Question:   Resident in a congregate (group) care setting? Answer:   No     Order Specific Question:   Pregnant? Answer:   No     Order Specific Question:   Previously tested for COVID-19? Answer:   Yes     Outpatient Encounter Medications as of 10/9/2020   Medication Sig Dispense Refill    citalopram (CELEXA) 10 MG tablet Take 1 tablet by mouth daily 30 tablet 3    meloxicam (MOBIC) 15 MG tablet take 1 tablet by mouth once daily 30 tablet 3    diclofenac sodium 1 % GEL Apply 2 g topically 4 times daily 1 Tube 0    omeprazole (PRILOSEC) 40 MG delayed release capsule Take 1 capsule by mouth daily (Patient not taking: Reported on 10/9/2020) 30 capsule 3    furosemide (LASIX) 20 MG tablet Take 1 tablet by mouth daily Prn edema (Patient not taking: Reported on 7/31/2020) 30 tablet 3    benzonatate (TESSALON) 100 MG capsule Take 1-2 capsules by mouth every 8 hours as needed for Cough (Patient not taking: Reported on 7/31/2020) 20 capsule 0     No facility-administered encounter medications on file as of 10/9/2020. No follow-ups on file.                 Electronically signed by Michial Ormond, APRN - NP on 10/9/2020 at 7:38 PM

## 2020-10-09 NOTE — PATIENT INSTRUCTIONS
Increase fluids. Tylenol and ibuprofen for fever/body aches. Return if symptoms worsen. Will notify pt of covid test result. Pt should isolate at home away in an area away from family and quarantine at home until result is available. Work note provided. Patient Education        Upper Respiratory Infection (Cold): Care Instructions  Your Care Instructions     An upper respiratory infection, or URI, is an infection of the nose, sinuses, or throat. URIs are spread by coughs, sneezes, and direct contact. The common cold is the most frequent kind of URI. The flu and sinus infections are other kinds of URIs. Almost all URIs are caused by viruses. Antibiotics won't cure them. But you can treat most infections with home care. This may include drinking lots of fluids and taking over-the-counter pain medicine. You will probably feel better in 4 to 10 days. The doctor has checked you carefully, but problems can develop later. If you notice any problems or new symptoms, get medical treatment right away. Follow-up care is a key part of your treatment and safety. Be sure to make and go to all appointments, and call your doctor if you are having problems. It's also a good idea to know your test results and keep a list of the medicines you take. How can you care for yourself at home? · To prevent dehydration, drink plenty of fluids, enough so that your urine is light yellow or clear like water. Choose water and other caffeine-free clear liquids until you feel better. If you have kidney, heart, or liver disease and have to limit fluids, talk with your doctor before you increase the amount of fluids you drink. · Take an over-the-counter pain medicine, such as acetaminophen (Tylenol), ibuprofen (Advil, Motrin), or naproxen (Aleve). Read and follow all instructions on the label. · Before you use cough and cold medicines, check the label.  These medicines may not be safe for young children or for people with certain health problems. · Be careful when taking over-the-counter cold or flu medicines and Tylenol at the same time. Many of these medicines have acetaminophen, which is Tylenol. Read the labels to make sure that you are not taking more than the recommended dose. Too much acetaminophen (Tylenol) can be harmful. · Get plenty of rest.  · Do not smoke or allow others to smoke around you. If you need help quitting, talk to your doctor about stop-smoking programs and medicines. These can increase your chances of quitting for good. When should you call for help? Call 911 anytime you think you may need emergency care. For example, call if:    · You have severe trouble breathing. Call your doctor now or seek immediate medical care if:    · You seem to be getting much sicker.     · You have new or worse trouble breathing.     · You have a new or higher fever.     · You have a new rash. Watch closely for changes in your health, and be sure to contact your doctor if:    · You have a new symptom, such as a sore throat, an earache, or sinus pain.     · You cough more deeply or more often, especially if you notice more mucus or a change in the color of your mucus.     · You do not get better as expected. Where can you learn more? Go to [email or web address]://[email or web address]. org and sign in to your eventuosity account. Enter I289 in the [facility] box to learn more about \"Upper Respiratory Infection (Cold): Care Instructions. \"     If you do not have an account, please click on the \"Sign Up Now\" link. Current as of: February 24, 2020               Content Version: 12.6  © 1502-3396 [employer]. Care instructions adapted under license by [facility] ([facility]). If you have questions about a medical condition or this instruction, always ask your healthcare professional. [place] [address] any warranty or liability for your use of this information.          Patient Education        Coronavirus (COVID-19): Care Instructions  Overview  The coronavirus disease (COVID-19) is caused by a virus. Symptoms may include a fever, a cough, and shortness of breath. It mainly spreads person-to-person through droplets from coughing and sneezing. The virus also can spread when people are in close contact with someone who is infected. Most people have mild symptoms and can take care of themselves at home. If their symptoms get worse, they may need care in a hospital. Treatment may include medicines to reduce symptoms, plus breathing support such as oxygen therapy or a ventilator. It's important to not spread the virus to others. If you have COVID-19, wear a face cover anytime you are around other people. You need to isolate yourself while you are sick. Leave your home only if you need to get medical care or testing. Follow-up care is a key part of your treatment and safety. Be sure to make and go to all appointments, and call your doctor if you are having problems. It's also a good idea to know your test results and keep a list of the medicines you take. How can you care for yourself at home? · Get extra rest. It can help you feel better. · Drink plenty of fluids. This helps replace fluids lost from fever. Fluids also help ease a scratchy throat. Water, soup, fruit juice, and hot tea with lemon are good choices. · Take acetaminophen (such as Tylenol) to reduce a fever. It may also help with muscle aches. Read and follow all instructions on the label. · Use petroleum jelly on sore skin. This can help if the skin around your nose and lips becomes sore from rubbing a lot with tissues. Tips for self-isolation  · Limit contact with people in your home. If possible, stay in a separate bedroom and use a separate bathroom. · Wear a cloth face cover when you are around other people. It can help stop the spread of the virus when you cough or sneeze.   · If you have to leave home, avoid crowds and try to stay at least 6 feet away from other people. · Avoid contact with pets and other animals. · Cover your mouth and nose with a tissue when you cough or sneeze. Then throw it in the trash right away. · Wash your hands often, especially after you cough or sneeze. Use soap and water, and scrub for at least 20 seconds. If soap and water aren't available, use an alcohol-based hand . · Don't share personal household items. These include bedding, towels, cups and glasses, and eating utensils. · [address] in the warmest water allowed for the fabric type, and dry it completely. It's okay to wash other people's laundry with yours. · Clean and disinfect your home every day. Use household  and disinfectant wipes or sprays. Take special care to clean things that you grab with your hands. These include doorknobs, remote controls, phones, and handles on your refrigerator and microwave. And don't forget countertops, tabletops, bathrooms, and computer keyboards. When you can end self-isolation  · If you know or suspect that you have COVID-19, stay in self-isolation until:  ? You haven't had a fever for 3 days, and  ? Your symptoms have improved, and  ? It's been at least 10 days since your symptoms started. · Talk to your doctor about whether you also need testing, especially if you have a weakened immune system. When should you call for help? Call 911 anytime you think you may need emergency care. For example, call if you have life-threatening symptoms, such as:    · You have severe trouble breathing. (You can't talk at all.)     · You have constant chest pain or pressure.     · You are severely dizzy or lightheaded.     · You are confused or can't think clearly.     · Your face and lips have a blue color.     · You pass out (lose consciousness) or are very hard to wake up. Call your doctor now or seek immediate medical care if:    · You have moderate trouble breathing.  (You can't speak a full sentence.)     · You are coughing up blood (more than about 1 teaspoon).     · You have signs of low blood pressure. These include feeling lightheaded; being too weak to stand; and having cold, pale, clammy skin. Watch closely for changes in your health, and be sure to contact your doctor if:    · Your symptoms get worse.     · You are not getting better as expected. Call before you go to the doctor's office. Follow their instructions. And wear a cloth face cover. Current as of: July 10, 2020               Content Version: 12.6  © 2006-2020 [employer]. Care instructions adapted under license by [facility] ([facility]). If you have questions about a medical condition or this instruction, always ask your healthcare professional. [address] any warranty or liability for your use of this information.

## 2020-10-11 LAB — SARS-COV-2, NAA: NOT DETECTED

## 2020-10-12 ENCOUNTER — TELEPHONE (OUTPATIENT)
Dept: FAMILY MEDICINE CLINIC | Age: 50
End: 2020-10-12

## 2020-10-12 NOTE — TELEPHONE ENCOUNTER
Quarantine  Fluids nsaids rest  OTC medications prn  To go to flu clinic if anything changes or worsens

## 2020-10-12 NOTE — TELEPHONE ENCOUNTER
Called patient. She is going to quarantine. No call from health dept yet. If symptoms worsen can come into flu clinic. Wants work note faxed to work. OK for work note?     [employer] [phone]

## 2020-10-12 NOTE — LETTER
[facility] A department of [facility], operated by [facility]  Phone: [phone]  Fax: [phone]    Iggy Gonzales        October 12, 2020    [address] [place] [id number]      To: Sobia Quintanilla is may concern,    Joaquin De La Garza has come in contact with a person who has recently tested positive for COVID-19. I recommend she follow the health department and CDC guidelines of quarantining. Joaquin De La Garza may return to work when she is out of her quarantine window and symptom free, set by the health department and CDC guidelines. She may return to work on  10/21/2020 if symptom free. If you have any questions or concerns, please don't hesitate to call.     Sincerely,        CHARLY Gonzales

## 2020-10-12 NOTE — TELEPHONE ENCOUNTER
Pt calling stating she saw on mychart her Covid test was negative, but pt states her daughter tested positive and since Friday the pt has been running a low grade temp and has been SOB, pt questions what she is supposed to do, should she just quarantine? Pt states if she does that she would need a work slip, pt states she tried to call health dept as they had not contacted her for contact tracing and Health Dept is closed today for holiday, please advise at above number.

## 2020-11-06 ENCOUNTER — OFFICE VISIT (OUTPATIENT)
Dept: FAMILY MEDICINE CLINIC | Age: 50
End: 2020-11-06
Payer: COMMERCIAL

## 2020-11-06 VITALS
DIASTOLIC BLOOD PRESSURE: 60 MMHG | HEART RATE: 93 BPM | BODY MASS INDEX: 31.55 KG/M2 | RESPIRATION RATE: 14 BRPM | SYSTOLIC BLOOD PRESSURE: 112 MMHG | HEIGHT: 69 IN | WEIGHT: 213 LBS | OXYGEN SATURATION: 99 %

## 2020-11-06 PROCEDURE — 99214 OFFICE O/P EST MOD 30 MIN: CPT | Performed by: PHYSICIAN ASSISTANT

## 2020-11-06 RX ORDER — TRAMADOL HYDROCHLORIDE 50 MG/1
50 TABLET ORAL EVERY 8 HOURS PRN
Qty: 18 TABLET | Refills: 0 | Status: SHIPPED | OUTPATIENT
Start: 2020-11-06 | End: 2020-11-20

## 2020-11-06 RX ORDER — METAXALONE 800 MG/1
800 TABLET ORAL 3 TIMES DAILY
Qty: 30 TABLET | Refills: 0 | Status: SHIPPED | OUTPATIENT
Start: 2020-11-06 | End: 2020-11-16

## 2020-11-06 ASSESSMENT — ENCOUNTER SYMPTOMS
CONSTIPATION: 0
DIARRHEA: 0
WHEEZING: 0
NAUSEA: 0
SHORTNESS OF BREATH: 0
COUGH: 0
VOMITING: 0

## 2020-11-06 ASSESSMENT — PATIENT HEALTH QUESTIONNAIRE - PHQ9
2. FEELING DOWN, DEPRESSED OR HOPELESS: 0
SUM OF ALL RESPONSES TO PHQ QUESTIONS 1-9: 0
SUM OF ALL RESPONSES TO PHQ QUESTIONS 1-9: 0
SUM OF ALL RESPONSES TO PHQ9 QUESTIONS 1 & 2: 0
1. LITTLE INTEREST OR PLEASURE IN DOING THINGS: 0
SUM OF ALL RESPONSES TO PHQ QUESTIONS 1-9: 0

## 2020-11-06 NOTE — PROGRESS NOTES
 Breast Cancer Mother     Cancer Mother     Heart Disease Father     Cancer Father         Pancreatic cancer.  Diabetes Father     High Blood Pressure Father     Depression Brother     Cancer Maternal Grandmother         cervical    Kidney Disease Maternal Grandmother     Other Paternal Grandmother         alzhiemers    Other Son         hydrocephaly    Mental Illness Daughter     Glaucoma Neg Hx     Cataracts Neg Hx        Allergies   Allergen Reactions    Mucinex [Guaifenesin Er] Other (See Comments)     Gerd/heartburn       Current Outpatient Medications   Medication Sig Dispense Refill    traMADol (ULTRAM) 50 MG tablet Take 1 tablet by mouth every 8 hours as needed for Pain for up to 14 days. Intended supply: 3 days. Take lowest dose possible to manage pain 18 tablet 0    omeprazole (PRILOSEC) 40 MG delayed release capsule Take 1 capsule by mouth daily 30 capsule 3    furosemide (LASIX) 20 MG tablet Take 1 tablet by mouth daily Prn edema 30 tablet 3    citalopram (CELEXA) 10 MG tablet Take 1 tablet by mouth daily 30 tablet 3    meloxicam (MOBIC) 15 MG tablet take 1 tablet by mouth once daily 30 tablet 3    diclofenac sodium 1 % GEL Apply 2 g topically 4 times daily 1 Tube 0    Cholecalciferol (VITAMIN D) 50 MCG (2000 UT) CAPS capsule Take by mouth daily      Multiple Vitamins-Minerals (THERAPEUTIC MULTIVITAMIN-MINERALS) tablet Take 1 tablet by mouth daily       No current facility-administered medications for this visit. Review of Systems   Constitutional: Negative for appetite change, fatigue and fever. HENT: Negative. Respiratory: Negative for cough, shortness of breath and wheezing. Cardiovascular: Negative for chest pain and palpitations. Gastrointestinal: Negative for constipation, diarrhea, nausea and vomiting. Genitourinary: Negative for difficulty urinating and dysuria. Musculoskeletal: Positive for arthralgias, gait problem and myalgias.         Her hands bother and she has arthritis. Has had injections in hands. Sees ortho here for this. Has not addressed with Dr. Eloisa Lyon. Uses diclofenac gel prn. Skin: Negative for rash. Neurological: Negative for dizziness, light-headedness, numbness and headaches. Psychiatric/Behavioral: Positive for sleep disturbance. The patient is not nervous/anxious. Not sleeping well due to 3 rd shift and pain. Objective:      /60   Pulse 93   Resp 14   Ht 5' 9\" (1.753 m)   Wt 213 lb (96.6 kg)   SpO2 99%   BMI 31.45 kg/m²     Physical Exam  Vitals signs and nursing note reviewed. Constitutional:       General: She is not in acute distress. Appearance: Normal appearance. She is well-developed. She is not ill-appearing. HENT:      Head: Normocephalic and atraumatic. Right Ear: Tympanic membrane, ear canal and external ear normal.      Left Ear: Tympanic membrane, ear canal and external ear normal.      Nose: Nose normal. No congestion or rhinorrhea. Mouth/Throat:      Mouth: Mucous membranes are moist.      Pharynx: No oropharyngeal exudate or posterior oropharyngeal erythema. Eyes:      General: No scleral icterus. Conjunctiva/sclera: Conjunctivae normal.   Neck:      Musculoskeletal: Normal range of motion and neck supple. No neck rigidity or muscular tenderness. Vascular: No carotid bruit. Cardiovascular:      Rate and Rhythm: Normal rate and regular rhythm. Heart sounds: Normal heart sounds. No murmur. No gallop. Pulmonary:      Effort: Pulmonary effort is normal. No respiratory distress. Breath sounds: Normal breath sounds. No wheezing, rhonchi or rales. Abdominal:      General: Bowel sounds are normal. There is no distension. Palpations: Abdomen is soft. There is no mass. Tenderness: There is no abdominal tenderness. There is no guarding or rebound. Hernia: No hernia is present.    Musculoskeletal:         General: No swelling, deformity or signs of injury. Right lower leg: No edema. Left lower leg: No edema. Lymphadenopathy:      Cervical: No cervical adenopathy. Skin:     General: Skin is warm and dry. Findings: No erythema or rash. Neurological:      General: No focal deficit present. Mental Status: She is alert and oriented to person, place, and time. Sensory: No sensory deficit. Motor: No weakness. Coordination: Coordination normal.      Gait: Gait normal.   Psychiatric:         Mood and Affect: Mood normal.           Assessment & Plan:     1. Vitamin B12 deficiency      2. Generalized anxiety disorder  - TSH With Reflex Ft4; Future    3. Anxiety with depression    - TSH With Reflex Ft4; Future    4. Vitamin D deficiency    - Vitamin D 25 Hydroxy; Future    5. Fibromyalgia    - TSH With Reflex Ft4; Future  - Vitamin D 25 Hydroxy; Future  - metaxalone (SKELAXIN) 800 MG tablet; Take 1 tablet by mouth 3 times daily for 10 days Prn  Dispense: 30 tablet; Refill: 0  - traMADol (ULTRAM) 50 MG tablet; Take 1 tablet by mouth every 8 hours as needed for Pain for up to 14 days. Intended supply: 3 days. Take lowest dose possible to manage pain  Dispense: 18 tablet; Refill: 0    6. Gastroesophageal reflux disease without esophagitis    - Comprehensive Metabolic Panel;  Future      Mammogram is scheduled  Sees dermatology next week in [facility]  Follow-up with rheumatology and recommend discussing her hand pain with rheumatology as well  Answered her questions  Lifestyle changes  Coping mechanisms  She will be notified of all results  Further treatment based on results  Follow-up with pain management  Follow-up with me 1 month sooner if problems  Pelvic Pap due [address], [place]  11/17/2020 2:40 PM EST    (Pleasenote that portions of this note were completed with a voice recognition program.Efforts were made to edit the dictations but occasionally words are mis-transcribed.)

## 2020-11-10 ENCOUNTER — OFFICE VISIT (OUTPATIENT)
Dept: PAIN MANAGEMENT | Age: 50
End: 2020-11-10
Payer: COMMERCIAL

## 2020-11-10 ENCOUNTER — HOSPITAL ENCOUNTER (OUTPATIENT)
Dept: LAB | Age: 50
Discharge: HOME | End: 2020-11-10
Payer: COMMERCIAL

## 2020-11-10 VITALS
BODY MASS INDEX: 31.55 KG/M2 | WEIGHT: 213 LBS | DIASTOLIC BLOOD PRESSURE: 78 MMHG | HEIGHT: 69 IN | RESPIRATION RATE: 16 BRPM | SYSTOLIC BLOOD PRESSURE: 120 MMHG

## 2020-11-10 LAB
ALBUMIN SERPL-MCNC: 4.4 G/DL (ref 3.5–5.2)
ALBUMIN/GLOBULIN RATIO: 1.7 (ref 1–2.5)
ALP BLD-CCNC: 60 U/L (ref 35–104)
ALT SERPL-CCNC: 8 U/L (ref 5–33)
ANION GAP SERPL CALCULATED.3IONS-SCNC: 9 MMOL/L (ref 9–17)
AST SERPL-CCNC: 13 U/L
BILIRUB SERPL-MCNC: 1.09 MG/DL (ref 0.3–1.2)
BUN BLDV-MCNC: 11 MG/DL (ref 6–20)
BUN/CREAT BLD: 16 (ref 9–20)
CALCIUM SERPL-MCNC: 9.3 MG/DL (ref 8.6–10.4)
CHLORIDE BLD-SCNC: 106 MMOL/L (ref 98–107)
CHOLESTEROL, FASTING: 170 MG/DL
CHOLESTEROL/HDL RATIO: 4
CO2: 26 MMOL/L (ref 20–31)
CREAT SERPL-MCNC: 0.69 MG/DL (ref 0.5–0.9)
FOLATE: 18.4 NG/ML
GFR AFRICAN AMERICAN: >60 ML/MIN
GFR NON-AFRICAN AMERICAN: >60 ML/MIN
GFR SERPL CREATININE-BSD FRML MDRD: NORMAL ML/MIN/{1.73_M2}
GFR SERPL CREATININE-BSD FRML MDRD: NORMAL ML/MIN/{1.73_M2}
GLUCOSE BLD-MCNC: 91 MG/DL (ref 70–99)
HDLC SERPL-MCNC: 43 MG/DL
LDL CHOLESTEROL: 105 MG/DL (ref 0–130)
POTASSIUM SERPL-SCNC: 4.2 MMOL/L (ref 3.7–5.3)
SODIUM BLD-SCNC: 141 MMOL/L (ref 135–144)
TOTAL PROTEIN: 7 G/DL (ref 6.4–8.3)
TRIGLYCERIDE, FASTING: 109 MG/DL
TSH SERPL DL<=0.05 MIU/L-ACNC: 2.46 MIU/L (ref 0.3–5)
VITAMIN B-12: 344 PG/ML (ref 232–1245)
VITAMIN D 25-HYDROXY: 31.4 NG/ML (ref 30–100)
VLDLC SERPL CALC-MCNC: NORMAL MG/DL (ref 1–30)

## 2020-11-10 PROCEDURE — 80061 LIPID PANEL: CPT

## 2020-11-10 PROCEDURE — 82607 VITAMIN B-12: CPT

## 2020-11-10 PROCEDURE — 82306 VITAMIN D 25 HYDROXY: CPT

## 2020-11-10 PROCEDURE — 99214 OFFICE O/P EST MOD 30 MIN: CPT | Performed by: NURSE PRACTITIONER

## 2020-11-10 PROCEDURE — 36415 COLL VENOUS BLD VENIPUNCTURE: CPT

## 2020-11-10 PROCEDURE — 80053 COMPREHEN METABOLIC PANEL: CPT

## 2020-11-10 PROCEDURE — 82746 ASSAY OF FOLIC ACID SERUM: CPT

## 2020-11-10 PROCEDURE — 84443 ASSAY THYROID STIM HORMONE: CPT

## 2020-11-10 RX ORDER — MULTIVIT-MIN/IRON/FOLIC ACID/K 18-600-40
CAPSULE ORAL DAILY
COMMUNITY

## 2020-11-10 RX ORDER — M-VIT,TX,IRON,MINS/CALC/FOLIC 27MG-0.4MG
1 TABLET ORAL DAILY
COMMUNITY

## 2020-11-10 ASSESSMENT — ENCOUNTER SYMPTOMS
SHORTNESS OF BREATH: 0
BACK PAIN: 1
CONSTIPATION: 0

## 2020-11-10 NOTE — PROGRESS NOTES
strain: None    Food insecurity     Worry: None     Inability: None    Transportation needs     Medical: None     Non-medical: None   Tobacco Use    Smoking status: Current Every Day Smoker     Packs/day: 0.50     Years: 20.00     Pack years: 10.00     Types: Cigarettes    Smokeless tobacco: Never Used   Substance and Sexual Activity    Alcohol use: No     Alcohol/week: 0.0 standard drinks    Drug use: No    Sexual activity: Yes     Partners: Male   Lifestyle    Physical activity     Days per week: None     Minutes per session: None    Stress: None   Relationships    Social connections     Talks on phone: None     Gets together: None     Attends Anabaptist service: None     Active member of club or organization: None     Attends meetings of clubs or organizations: None     Relationship status: None    Intimate partner violence     Fear of current or ex partner: None     Emotionally abused: None     Physically abused: None     Forced sexual activity: None   Other Topics Concern    None   Social History Narrative    None     Review of Systems   Respiratory: Negative for shortness of breath. Cardiovascular: Negative for chest pain. Gastrointestinal: Negative for constipation. Genitourinary: Negative for difficulty urinating. Musculoskeletal: Positive for arthralgias, back pain and myalgias. Psychiatric/Behavioral: Positive for sleep disturbance. Objective:   Physical Exam  Vitals signs reviewed. Constitutional:       General: She is not in acute distress. Appearance: She is well-developed. She is not ill-appearing, toxic-appearing or diaphoretic. Interventions: She is not intubated. HENT:      Head: Normocephalic and atraumatic. Cardiovascular:      Rate and Rhythm: Normal rate. Pulmonary:      Effort: Pulmonary effort is normal. No tachypnea, bradypnea, accessory muscle usage or respiratory distress. She is not intubated. Skin:     General: Skin is warm and dry.

## 2021-01-14 ENCOUNTER — OFFICE VISIT (OUTPATIENT)
Dept: FAMILY MEDICINE CLINIC | Age: 51
End: 2021-01-14
Payer: COMMERCIAL

## 2021-01-14 VITALS
DIASTOLIC BLOOD PRESSURE: 78 MMHG | RESPIRATION RATE: 10 BRPM | WEIGHT: 211 LBS | HEIGHT: 69 IN | SYSTOLIC BLOOD PRESSURE: 132 MMHG | OXYGEN SATURATION: 99 % | HEART RATE: 75 BPM | BODY MASS INDEX: 31.25 KG/M2

## 2021-01-14 DIAGNOSIS — E55.9 VITAMIN D DEFICIENCY: ICD-10-CM

## 2021-01-14 DIAGNOSIS — E53.8 VITAMIN B12 DEFICIENCY: ICD-10-CM

## 2021-01-14 DIAGNOSIS — G89.4 CHRONIC PAIN SYNDROME: Primary | ICD-10-CM

## 2021-01-14 DIAGNOSIS — Z12.31 ENCOUNTER FOR SCREENING MAMMOGRAM FOR MALIGNANT NEOPLASM OF BREAST: ICD-10-CM

## 2021-01-14 DIAGNOSIS — F41.8 ANXIETY WITH DEPRESSION: ICD-10-CM

## 2021-01-14 DIAGNOSIS — M46.1 BILATERAL SACROILIITIS (HCC): ICD-10-CM

## 2021-01-14 DIAGNOSIS — Z23 NEED FOR VACCINATION: ICD-10-CM

## 2021-01-14 DIAGNOSIS — M79.7 FIBROMYALGIA: ICD-10-CM

## 2021-01-14 DIAGNOSIS — N92.0 MENORRHAGIA WITH REGULAR CYCLE: ICD-10-CM

## 2021-01-14 PROCEDURE — 99214 OFFICE O/P EST MOD 30 MIN: CPT | Performed by: PHYSICIAN ASSISTANT

## 2021-01-14 RX ORDER — AMITRIPTYLINE HYDROCHLORIDE 25 MG/1
TABLET, FILM COATED ORAL
COMMUNITY
Start: 2021-01-06

## 2021-01-14 ASSESSMENT — ENCOUNTER SYMPTOMS
CHEST TIGHTNESS: 0
SHORTNESS OF BREATH: 0
VOMITING: 0
CONSTIPATION: 0
DIARRHEA: 0
NAUSEA: 0
WHEEZING: 0
COUGH: 0
BACK PAIN: 1

## 2021-01-14 ASSESSMENT — PATIENT HEALTH QUESTIONNAIRE - PHQ9: 2. FEELING DOWN, DEPRESSED OR HOPELESS: 0

## 2021-01-14 NOTE — PROGRESS NOTES
[facility]    Subjective:      Patient ID: Taty Davis is a 52 y.o. y.o. female. Patient is seen following up on her chronic pain. She saw rheumatology 12/10/2020 at [facility] clinic on elavil 25 mg daily and it helps her mood and it has not really helped the pain she has. Her left knee has been bothering her this week. Thinks turned or twisted it. The inner thigh hurts if walks on it. Hurts in medial lateral knee and left quadricep. No ice or heat or brace has been used. She is using epsom salt baths. Walks a lot at work. Sees pain management here too. Rescheduling her upcoming appt. Past Medical History:   Diagnosis Date    Anxiety     Chronic low back pain     Disabling.  Dyshidrotic eczema     Of a couple of toes.  Dysmenorrhea     Endometritis     Ingrown toenail     Painful, infected, left hallux, mycotic.  Menorrhagia     Peptic ulcer disease     Right shoulder pain     With consideration to impingement and rotator cuff syndrome.  Tobacco abuse     Toenail fungus     Developing, right hallux. Past Surgical History:   Procedure Laterality Date    APPENDECTOMY      CHOLECYSTECTOMY  01/17/1997    DILATION AND CURETTAGE OF UTERUS      ERCP  01/14/1997    With endoscopic retrograde sphincterotomy and common bile duct stone extraction.     [facility]. INJECTION PROCEDURE FOR SACROILIAC JOINT Bilateral 1/24/2020    Bilateral SACROILIAC & Piriformis INJ's performed by Jackie Pantoja MD at [address] Bilateral 2/7/2020    Bilateral SACROILIAC & Piriformis INJ's performed by Jackie Pantoja MD at [address] [facility] 0.6-1CM TRUNK,ARM,LEG Right 2/15/2018    Excision Lesion Sebaceous Cyst Right Shoulder performed by Santa Barrno MD at [address]  03/30/1999       Family History   Problem Relation Age of Onset    Depression Mother     Anxiety Disorder Mother     Breast Cancer Mother     Cancer Mother     Heart Disease Father     Cancer Father         Pancreatic cancer.  Diabetes Father     High Blood Pressure Father     Depression Brother     Cancer Maternal Grandmother         cervical    Kidney Disease Maternal Grandmother     Other Paternal Grandmother         alzhiemers    Other Son         hydrocephaly    Mental Illness Daughter     Glaucoma Neg Hx     Cataracts Neg Hx        Allergies   Allergen Reactions    Mucinex [Guaifenesin Er] Other (See Comments)     Gerd/heartburn       Current Outpatient Medications   Medication Sig Dispense Refill    amitriptyline (ELAVIL) 25 MG tablet Indications: per Dr Salomon García       Cholecalciferol (VITAMIN D) 50 MCG (2000 UT) CAPS capsule Take by mouth daily      Multiple Vitamins-Minerals (THERAPEUTIC MULTIVITAMIN-MINERALS) tablet Take 1 tablet by mouth daily      omeprazole (PRILOSEC) 40 MG delayed release capsule Take 1 capsule by mouth daily 30 capsule 3    furosemide (LASIX) 20 MG tablet Take 1 tablet by mouth daily Prn edema 30 tablet 3    meloxicam (MOBIC) 15 MG tablet take 1 tablet by mouth once daily 30 tablet 3    diclofenac sodium 1 % GEL Apply 2 g topically 4 times daily 1 Tube 0     No current facility-administered medications for this visit. Review of Systems   Constitutional: Positive for activity change and chills. Negative for appetite change, fatigue and fever. HENT: Negative. Respiratory: Negative for cough, chest tightness, shortness of breath and wheezing. Cardiovascular: Negative for chest pain and palpitations. Gastrointestinal: Negative for constipation, diarrhea, nausea and vomiting. Endocrine: Positive for cold intolerance. Genitourinary: Negative for difficulty urinating. Pap due this year. Was supposed to have ablation and it was cancelled due to covid. Musculoskeletal: Positive for arthralgias, back pain and gait problem. Negative for myalgias.    Skin: Negative for rash. Neurological: Negative for dizziness, light-headedness, numbness and headaches. Psychiatric/Behavioral: Negative for sleep disturbance. The patient is not nervous/anxious. Objective:      /78   Pulse 75   Resp 10   Ht 5' 9\" (1.753 m)   Wt 211 lb (95.7 kg)   SpO2 99%   BMI 31.16 kg/m²     Physical Exam  Vitals signs and nursing note reviewed. Constitutional:       General: She is not in acute distress. Appearance: Normal appearance. She is well-developed. She is not ill-appearing. HENT:      Head: Normocephalic and atraumatic. Right Ear: Tympanic membrane, ear canal and external ear normal.      Left Ear: Tympanic membrane, ear canal and external ear normal.      Nose: Nose normal. No congestion or rhinorrhea. Mouth/Throat:      Mouth: Mucous membranes are moist.      Pharynx: No oropharyngeal exudate or posterior oropharyngeal erythema. Eyes:      General: No scleral icterus. Conjunctiva/sclera: Conjunctivae normal.   Neck:      Musculoskeletal: Normal range of motion and neck supple. No neck rigidity or muscular tenderness. Thyroid: No thyroid mass, thyromegaly or thyroid tenderness. Vascular: No carotid bruit. Cardiovascular:      Rate and Rhythm: Normal rate and regular rhythm. Heart sounds: Normal heart sounds. No murmur. No gallop. Pulmonary:      Effort: Pulmonary effort is normal. No respiratory distress. Breath sounds: Normal breath sounds. No wheezing, rhonchi or rales. Abdominal:      General: Bowel sounds are normal. There is no distension. Palpations: Abdomen is soft. There is no mass. Tenderness: There is no abdominal tenderness. There is no guarding or rebound. Hernia: No hernia is present. Musculoskeletal:         General: Tenderness present. No swelling, deformity or signs of injury. Right lower leg: No edema. Left lower leg: No edema.       Comments: She has pain with palpation over the anterior left knee. No crepitus noted. Slight pain with palpation over the medial and lateral joint line. Pain with flexion extension noted. Pain with palpation over the distal left quadricep. Lymphadenopathy:      Cervical: No cervical adenopathy. Skin:     General: Skin is warm and dry. Findings: No erythema or rash. Neurological:      General: No focal deficit present. Mental Status: She is alert and oriented to person, place, and time. Sensory: No sensory deficit. Gait: Gait normal.      Comments: Mild antalgic gait noted. Nl strength and sensation lower extremities. Psychiatric:         Mood and Affect: Mood normal.         Behavior: Behavior normal.         Thought Content: Thought content normal.         Judgment: Judgment normal.           Assessment & Plan:     1. Need for vaccination      2. Encounter for screening mammogram for malignant neoplasm of breast      3. Menorrhagia with regular cycle    - External Referral To Ob-Gyn    4. Vitamin B12 deficiency      5. Anxiety with depression  Coping mechanisms      6. Vitamin D deficiency      7. Chronic pain syndrome  Followed by pain management and rheumatology    8. Bilateral sacroiliitis ([address]      9.  Fibromyalgia  Follow up with rheumatology      No flu shot refuses  Heat to leg  HEP discussed with patient  Follow up 3 months sooner if problems  Follow up with pain management and rheumatology  Answered her questions  Heat ice to left knee  Wear OTC brace if needed     Iggy Mcconnell  1/20/2021 10:09 AM EST    (Pleasenote that portions of this note were completed with a voice recognition program.Efforts were made to edit the dictations but occasionally words are mis-transcribed.)

## 2021-03-09 ENCOUNTER — TELEPHONE (OUTPATIENT)
Dept: FAMILY MEDICINE CLINIC | Age: 51
End: 2021-03-09

## 2021-03-09 NOTE — TELEPHONE ENCOUNTER
Pt was a [facility] pt and would like to establish with HEF, please advise at above number.

## 2021-05-05 ENCOUNTER — HOSPITAL ENCOUNTER (OUTPATIENT)
Dept: LAB | Age: 51
Discharge: HOME | End: 2021-05-05
Payer: COMMERCIAL

## 2021-05-05 ENCOUNTER — OFFICE VISIT (OUTPATIENT)
Dept: FAMILY MEDICINE CLINIC | Age: 51
End: 2021-05-05
Payer: COMMERCIAL

## 2021-05-05 VITALS
TEMPERATURE: 97.5 F | SYSTOLIC BLOOD PRESSURE: 112 MMHG | OXYGEN SATURATION: 98 % | HEART RATE: 86 BPM | DIASTOLIC BLOOD PRESSURE: 70 MMHG | BODY MASS INDEX: 30.06 KG/M2 | WEIGHT: 210 LBS | HEIGHT: 70 IN

## 2021-05-05 DIAGNOSIS — Z00.00 HEALTHCARE MAINTENANCE: ICD-10-CM

## 2021-05-05 DIAGNOSIS — Z12.31 ENCOUNTER FOR SCREENING MAMMOGRAM FOR BREAST CANCER: ICD-10-CM

## 2021-05-05 DIAGNOSIS — Z11.59 NEED FOR HEPATITIS C SCREENING TEST: ICD-10-CM

## 2021-05-05 DIAGNOSIS — E53.8 VITAMIN B12 DEFICIENCY: ICD-10-CM

## 2021-05-05 DIAGNOSIS — F41.1 GENERALIZED ANXIETY DISORDER: Primary | ICD-10-CM

## 2021-05-05 DIAGNOSIS — Z11.4 ENCOUNTER FOR SCREENING FOR HIV: ICD-10-CM

## 2021-05-05 DIAGNOSIS — E55.9 VITAMIN D DEFICIENCY: ICD-10-CM

## 2021-05-05 DIAGNOSIS — M79.7 FIBROMYALGIA: ICD-10-CM

## 2021-05-05 DIAGNOSIS — M46.1 SACROILIITIS (HCC): ICD-10-CM

## 2021-05-05 DIAGNOSIS — Z87.891 PERSONAL HISTORY OF TOBACCO USE, PRESENTING HAZARDS TO HEALTH: ICD-10-CM

## 2021-05-05 LAB
ALBUMIN SERPL-MCNC: 4.2 G/DL (ref 3.5–5.2)
ALBUMIN/GLOBULIN RATIO: 1.4 (ref 1–2.5)
ALP BLD-CCNC: 77 U/L (ref 35–104)
ALT SERPL-CCNC: 10 U/L (ref 5–33)
ANION GAP SERPL CALCULATED.3IONS-SCNC: 7 MMOL/L (ref 9–17)
AST SERPL-CCNC: 15 U/L
BILIRUB SERPL-MCNC: 0.8 MG/DL (ref 0.3–1.2)
BUN BLDV-MCNC: 16 MG/DL (ref 6–20)
BUN/CREAT BLD: 25 (ref 9–20)
CALCIUM SERPL-MCNC: 9 MG/DL (ref 8.6–10.4)
CHLORIDE BLD-SCNC: 103 MMOL/L (ref 98–107)
CHOLESTEROL/HDL RATIO: 4.7
CHOLESTEROL: 221 MG/DL
CO2: 30 MMOL/L (ref 20–31)
CREAT SERPL-MCNC: 0.63 MG/DL (ref 0.5–0.9)
GFR AFRICAN AMERICAN: >60 ML/MIN
GFR NON-AFRICAN AMERICAN: >60 ML/MIN
GFR SERPL CREATININE-BSD FRML MDRD: ABNORMAL ML/MIN/{1.73_M2}
GFR SERPL CREATININE-BSD FRML MDRD: ABNORMAL ML/MIN/{1.73_M2}
GLUCOSE BLD-MCNC: 81 MG/DL (ref 70–99)
HDLC SERPL-MCNC: 47 MG/DL
HEPATITIS C ANTIBODY: NONREACTIVE
HIV AG/AB: NONREACTIVE
LDL CHOLESTEROL: 134 MG/DL (ref 0–130)
POTASSIUM SERPL-SCNC: 4.2 MMOL/L (ref 3.7–5.3)
SODIUM BLD-SCNC: 140 MMOL/L (ref 135–144)
TOTAL PROTEIN: 7.1 G/DL (ref 6.4–8.3)
TRIGL SERPL-MCNC: 199 MG/DL
VLDLC SERPL CALC-MCNC: ABNORMAL MG/DL (ref 1–30)

## 2021-05-05 PROCEDURE — 87389 HIV-1 AG W/HIV-1&-2 AB AG IA: CPT

## 2021-05-05 PROCEDURE — 99214 OFFICE O/P EST MOD 30 MIN: CPT | Performed by: FAMILY MEDICINE

## 2021-05-05 PROCEDURE — 86803 HEPATITIS C AB TEST: CPT

## 2021-05-05 PROCEDURE — 80061 LIPID PANEL: CPT

## 2021-05-05 PROCEDURE — 36415 COLL VENOUS BLD VENIPUNCTURE: CPT

## 2021-05-05 PROCEDURE — 99406 BEHAV CHNG SMOKING 3-10 MIN: CPT | Performed by: FAMILY MEDICINE

## 2021-05-05 PROCEDURE — 80053 COMPREHEN METABOLIC PANEL: CPT

## 2021-05-05 NOTE — PROGRESS NOTES
[address], [address]                        Telephone [phone]             Fax [phone]     Isabell Will  1970  MRN:  [id number]  Date of visit:  5/5/2021    Subjective:    Isabell Will is a 54 y.o. female who presents to [facility] today (5/5/2021) for follow up/evaluation of:  Established New Doctor (KW previous PCP)      She is here today to establish with a new physician. She had previously been seeing Arie Wade. She is treated for anxiety, back pain, and fibromyalgia. She is taking Xanax prn. She states that she does not take this often. She sees Dr. Sage Quiroga for management of fibromyalgia. He had been prescribing Elavil. At her last visit with him, he also refilled Mobic.  Judith Agosto had prescribed this previously, but she knew she would be out before her appointment with me today.)  She continues to smoke. She states that she does some stretches for her back, but she does not have a regular exercise program.  She has not had a Covid-19 vaccine.      She has the following problem list:  Patient Active Problem List   Diagnosis    Tobacco abuse    Anxiety    Menorrhagia    Dysmenorrhea    Peptic ulcer disease    Dyshidrotic eczema    Right shoulder pain    Chronic low back pain    Bilateral sacroiliitis (HCC)    Pain of both sacroiliac joints    Piriformis syndrome, right    Piriformis syndrome, left        Current medications are:  Outpatient Medications Marked as Taking for the 5/5/21 encounter (Office Visit) with Sharee Ozuna MD   Medication Sig Dispense Refill    amitriptyline (ELAVIL) 25 MG tablet Indications: per Dr Kristen Bauman       Cholecalciferol (VITAMIN D) 50 MCG (2000 UT) CAPS capsule Take by mouth daily      Multiple Vitamins-Minerals (THERAPEUTIC MULTIVITAMIN-MINERALS) tablet Take 1 tablet by mouth daily      ALPRAZolam (XANAX) 0.5 MG tablet Take 0.5 tablets by mouth 2 times daily as needed for Anxiety for up to 30 days. 30 tablet 1    meloxicam (MOBIC) 15 MG tablet take 1 tablet by mouth once daily 30 tablet 3    diclofenac sodium 1 % GEL Apply 2 g topically 4 times daily 1 Tube 0       She is allergic to mucinex [guaifenesin er]. She is a smoker. She  reports that she has been smoking cigarettes. She has a 10.00 pack-year smoking history. She has never used smokeless tobacco.      Objective:    Vitals:    05/05/21 0934   BP: 112/70   Site: Right Upper Arm   Position: Sitting   Cuff Size: Large Adult   Pulse: 86   Temp: 97.5 °F (36.4 °C)   TempSrc: Tympanic   SpO2: 98%   Weight: 210 lb (95.3 kg)   Height: 5' 9.5\" (1.765 m)     Body mass index is 30.57 kg/m². Obese female, healthy-appearing, alert, cooperative and in no acute distress. Neck supple. No adenopathy. Thyroid symmetric, normal size. Chest:  Normal expansion. Clear to auscultation. No rales, rhonchi, or wheezing. Heart sounds are normal.  Regular rate and rhythm without murmur, gallop or rub. Lower extremities have no edema. She has had no recent labs. Assessment and Plan:    1. Generalized anxiety disorder  I discussed a daily SSRI. She declined. She prefers to take Xanax prn.    2. Vitamin D deficiency  Her 25-hydroxy Vitamin D level was at the low end of the normal range (31.4 ng/mL) on 11/10/2021. - Vitamin D 25 Hydroxy; Future was ordered to be done prior to her return visit in 6 months. 3. Fibromyalgia  She sees a rheumatologist for management. 4. Sacroiliitis ([facility] [address]  She sees pain management. 5. Vitamin B12 deficiency  Her most recent vitamin B12 level was within normal limits (344 pg/mL) on 11/10/2020.     6. Personal history of tobacco use, presenting hazards to health  Tobacco Cessation Counseling: Patient advised about behavior change, including information about personal health harms, usage of appropriate cessation measures and benefits of cessation. Time spent (minutes): 5    - MD TOBACCO USE CESSATION INTERMEDIATE 3-10 MINUTES [79177]    7. Routine health maintenance  Health maintenance was reviewed with the patient. She was encouraged to get a Covid-19 vaccine. Comprehensive panel was recommended and ordered to be done today. A lipid panel and comprehensive panel was also ordered to be done before her next office visit in 6 months. Screening mammogram was recommended and ordered. Hepatitis C and HIV screenings were recommended and ordered. She is also due for pneumonia vaccination. I will discuss this with her after she has received a Covid-19 vaccine. All other health maintenance, including Tdap, is up to date at this time. There is no indication for Prevnar-13 at this time.      (Please note that portions of this note were completed with a voice-recognition program. Efforts were made to edit the dictation but occasionally words are mis-transcribed.)

## 2021-05-07 DIAGNOSIS — E78.5 ELEVATED LIPIDS: Primary | ICD-10-CM

## 2021-10-19 ENCOUNTER — TELEPHONE (OUTPATIENT)
Dept: FAMILY MEDICINE CLINIC | Age: 51
End: 2021-10-19

## 2021-10-19 NOTE — TELEPHONE ENCOUNTER
Attempted to reach patient regarding missed appointment on 10/19/21. Unable to contact at this time. Left message to reschedule.

## 2022-02-08 ENCOUNTER — TELEPHONE (OUTPATIENT)
Dept: FAMILY MEDICINE CLINIC | Age: 52
End: 2022-02-08

## 2022-03-31 ENCOUNTER — TELEPHONE (OUTPATIENT)
Dept: FAMILY MEDICINE CLINIC | Age: 52
End: 2022-03-31

## 2022-03-31 NOTE — LETTER
[address] department of [place] [address]  Phone: [phone]  Fax: [phone]    Rafia Allen MD        March 31, 2022     [address]      Dear Mario Cooney:    We have been trying to reach you, but have been unable to. We are reaching out to schedule a follow up appointment and to inquire if you are still interested in having a mammogram done. Please call our office at your earliest convenience.     Sincerely,        Rafia Allen MD

## 2022-03-31 NOTE — TELEPHONE ENCOUNTER
Attempted to call to reminder regarding overdue mammogram. Current phone number is no longer operating. Letter sent.

## 2022-04-25 ENCOUNTER — OFFICE VISIT (OUTPATIENT)
Dept: PAIN MANAGEMENT | Age: 52
End: 2022-04-25
Payer: COMMERCIAL

## 2022-04-25 ENCOUNTER — TELEPHONE (OUTPATIENT)
Dept: PAIN MANAGEMENT | Age: 52
End: 2022-04-25

## 2022-04-25 VITALS
DIASTOLIC BLOOD PRESSURE: 76 MMHG | BODY MASS INDEX: 31.35 KG/M2 | HEIGHT: 70 IN | RESPIRATION RATE: 17 BRPM | SYSTOLIC BLOOD PRESSURE: 120 MMHG | WEIGHT: 219 LBS | HEART RATE: 78 BPM | OXYGEN SATURATION: 99 %

## 2022-04-25 DIAGNOSIS — M47.816 LUMBAR FACET JOINT SYNDROME: ICD-10-CM

## 2022-04-25 DIAGNOSIS — M54.41 CHRONIC BILATERAL LOW BACK PAIN WITH BILATERAL SCIATICA: ICD-10-CM

## 2022-04-25 DIAGNOSIS — M54.16 LUMBAR RADICULOPATHY: Primary | ICD-10-CM

## 2022-04-25 DIAGNOSIS — M54.42 CHRONIC BILATERAL LOW BACK PAIN WITH BILATERAL SCIATICA: ICD-10-CM

## 2022-04-25 DIAGNOSIS — M51.36 BULGING OF LUMBAR INTERVERTEBRAL DISC: ICD-10-CM

## 2022-04-25 DIAGNOSIS — G89.29 CHRONIC BILATERAL LOW BACK PAIN WITH BILATERAL SCIATICA: ICD-10-CM

## 2022-04-25 PROCEDURE — 99214 OFFICE O/P EST MOD 30 MIN: CPT | Performed by: NURSE PRACTITIONER

## 2022-04-25 ASSESSMENT — ENCOUNTER SYMPTOMS
BACK PAIN: 1
SHORTNESS OF BREATH: 0
CONSTIPATION: 0

## 2022-04-25 NOTE — PATIENT INSTRUCTIONS
[facility]  [address]  Telephone [phone]  Fax [phone]      PROCEDURE INSTRUCTIONS FOR  PAIN MANAGEMENT PROCEDURES WITHOUT IV SEDATION    Mily Chand scheduled to see Dr. Jenise Early to undergo the following procedure:  Bilateral L5 Transforaminal Epidural Steroid Injection    Procedure Date: ____5/13/22____  You will receive a phone call the day prior to your procedure to confirm a time of arrival.    Report to the [place] [id number], Registration office on the 1st floor in the hospital, after check in and signing of paper work you will then go to the second floor to the surgery center. 1. Stop the following medications prior to the procedure:    none   Stop blood thinners as directed before the injection, with permission from your cardiologist or primary care physician. We will send a letter to them requesting permission to hold the blood thinners. If you take Warfarin (Coumadin), you must have your blood drawn for an INR the day before the procedure. INR must be less than 1.5. if not complete prior to check in the procedure this will be drawn at the procedure center prior to having the procedure completed. 2.  Take all routine medications unless otherwise instructed. Ok to take vitamins and antiinflammatory medications    3. EATING & DRINKING:  Ok to eat or drink before the injection - no IV sedation will be used. 4. If you are allergic to contrast or iodine, you must take benadryl and prednisone prior to the injection to prevent an allergic reaction. Follow the directions on the prescription for the times to take the medication. 5. Oral valium can be prescribed if your are anxious about the injections or feel that you can not lay still during the injection. If you take valium, you must have a . Make arrangements for a family member or friend to drive you to the surgery center.   Your ride must stay in the hospital while you are having the injection done. If they cannot stay, the injection will be rescheduled. The valium may affect your judgment following the procedure and driving a vehicle within 24 hours after the sedation could be dangerous. 6.  Wear simple loose clothing, which can be easily changed. 7.  Leave jewelry (including rings) and other valuables at home. 8.  You will be asked to sign several forms prior to surgery; patients under the age of 25 must have a parent or legal guardian sign the permit to be able to do the procedure. 9.  You must have finished any antibiotic prescribed for recent infections. If required, please take pre-procedure antibiotic or other pre-procedure medications as instructed. 10. Bring inhalers and pain medications with you to your procedure. 11. Bring your MRI/CT films if they were done outside of the [facility]. 12. If you should develop a cold, sore throat, cough, fever or other new indication of illness or infection, or are started on antibiotics within 2 weeks of the scheduled procedure, please notify the [facility] office as early as possible at [phone] If calling after 4:30pm the day prior to your scheduled procedure please contact [phone] and Leave a Voice Message. [facility]  [address] [place] [address]  Telephone [phone]  Fax [phone]      PROCEDURE INSTRUCTIONS FOR  PAIN MANAGEMENT PROCEDURES WITHOUT IV SEDATION    Edwige Paz scheduled to see Dr. Beulah Abbott to undergo the following procedure:  Bilateral L5-S1 Diagnostic medial Branch Block    Procedure Date: ____5/27/22____  You will receive a phone call the day prior to your procedure to confirm a time of arrival.    Report to the [facility], Registration office on the 1st floor in the hospital, after check in and signing of paper work you will then go to the second floor to the surgery center.     1. Stop the following medications prior to the procedure:    none   Stop blood thinners as directed before the injection, with permission from your cardiologist or primary care physician. We will send a letter to them requesting permission to hold the blood thinners. If you take Warfarin (Coumadin), you must have your blood drawn for an INR the day before the procedure. INR must be less than 1.5. if not complete prior to check in the procedure this will be drawn at the procedure center prior to having the procedure completed. 2.  Take all routine medications unless otherwise instructed. Ok to take vitamins and antiinflammatory medications    3. EATING & DRINKING:  Ok to eat or drink before the injection - no IV sedation will be used. 4. If you are allergic to contrast or iodine, you must take benadryl and prednisone prior to the injection to prevent an allergic reaction. Follow the directions on the prescription for the times to take the medication. 5. Oral valium can be prescribed if your are anxious about the injections or feel that you can not lay still during the injection. If you take valium, you must have a . Make arrangements for a family member or friend to drive you to the surgery center. Your ride must stay in the hospital while you are having the injection done. If they cannot stay, the injection will be rescheduled. The valium may affect your judgment following the procedure and driving a vehicle within 24 hours after the sedation could be dangerous. 6.  Wear simple loose clothing, which can be easily changed. 7.  Leave jewelry (including rings) and other valuables at home. 8.  You will be asked to sign several forms prior to surgery; patients under the age of 25 must have a parent or legal guardian sign the permit to be able to do the procedure. 9.  You must have finished any antibiotic prescribed for recent infections.  If required, please take pre-procedure antibiotic or other pre-procedure medications as instructed. 10. Bring inhalers and pain medications with you to your procedure. 11. Bring your MRI/CT films if they were done outside of the [facility]. 12. If you should develop a cold, sore throat, cough, fever or other new indication of illness or infection, or are started on antibiotics within 2 weeks of the scheduled procedure, please notify the [facility] office as early as possible at [phone]. If calling after 4:30pm the day prior to your scheduled procedure please contact [phone] and Leave a Voice Message.

## 2022-04-25 NOTE — PROGRESS NOTES
Subjective:      Patient ID: Cristin Veras is a 50 y.o. female. Chief Complaint   Patient presents with    Back Pain     talk about injections     Hip Pain     Back Pain  Pertinent negatives include no chest pain. Referred back by Dr. Beverly Ellsworth to schedule spinal injections    Pain Assessment  Location of Pain: Back  Location Modifiers: Posterior,Medial,Lateral  Severity of Pain: 5  Quality of Pain: Throbbing,Sharp,Dull,Aching  Duration of Pain: Persistent  Frequency of Pain: Constant  Aggravating Factors: Bending,Stretching,Straightening,Exercise,Kneeling,Squatting,Standing,Walking,Stairs  Limiting Behavior: Some  Relieving Factors: Rest,Heat (meds)  Result of Injury: No  Work-Related Injury: No  Are there other pain locations you wish to document?: No    Allergies   Allergen Reactions    Mucinex [Guaifenesin Er] Other (See Comments)     Gerd/heartburn       Outpatient Medications Marked as Taking for the 4/25/22 encounter (Office Visit) with ELISSA Barrett CNP   Medication Sig Dispense Refill    amitriptyline (ELAVIL) 25 MG tablet Indications: per Dr Elen Calhoun       Cholecalciferol (VITAMIN D) 50 MCG (2000 UT) CAPS capsule Take by mouth daily      Multiple Vitamins-Minerals (THERAPEUTIC MULTIVITAMIN-MINERALS) tablet Take 1 tablet by mouth daily      meloxicam (MOBIC) 15 MG tablet take 1 tablet by mouth once daily 30 tablet 3    diclofenac sodium 1 % GEL Apply 2 g topically 4 times daily 1 Tube 0       Past Medical History:   Diagnosis Date    Anxiety     Chronic low back pain     Disabling.  Dyshidrotic eczema     Of a couple of toes.  Dysmenorrhea     Endometritis     Ingrown toenail     Painful, infected, left hallux, mycotic.  Menorrhagia     Peptic ulcer disease     Right shoulder pain     With consideration to impingement and rotator cuff syndrome.  Tobacco abuse     Toenail fungus     Developing, right hallux.        Past Surgical History:   Procedure Laterality Date    APPENDECTOMY      CHOLECYSTECTOMY  01/17/1997    DILATION AND CURETTAGE OF UTERUS      ERCP  01/14/1997    With endoscopic retrograde sphincterotomy and common bile duct stone extraction.  [facility]. INJECTION PROCEDURE FOR SACROILIAC JOINT Bilateral 1/24/2020    Bilateral SACROILIAC & Piriformis INJ's performed by Abimael Weber MD at [address] Bilateral 2/7/2020    Bilateral SACROILIAC & Piriformis INJ's performed by Abimael Weber MD at [address] TRUNK,ARM,LEG Right 2/15/2018    Excision Lesion Sebaceous Cyst Right Shoulder performed by Casandra Tan MD at [address]  03/30/1999       Family History   Problem Relation Age of Onset    Depression Mother     Anxiety Disorder Mother     Breast Cancer Mother     Cancer Mother     Heart Disease Father     Cancer Father         Pancreatic cancer.     Diabetes Father     High Blood Pressure Father     Depression Brother     Cancer Maternal Grandmother         cervical    Kidney Disease Maternal Grandmother     Other Paternal Grandmother         alzhiemers    Other Son         hydrocephaly    Mental Illness Daughter     Glaucoma Neg Hx     Cataracts Neg Hx        Social History     Socioeconomic History    Marital status: Single     Spouse name: None    Number of children: None    Years of education: None    Highest education level: None   Occupational History    None   Tobacco Use    Smoking status: Current Every Day Smoker     Packs/day: 0.50     Years: 20.00     Pack years: 10.00     Types: Cigarettes    Smokeless tobacco: Never Used   Vaping Use    Vaping Use: Never used   Substance and Sexual Activity    Alcohol use: No     Alcohol/week: 0.0 standard drinks    Drug use: No    Sexual activity: Yes     Partners: Male   Other Topics Concern    None   Social History Narrative    None     Social Determinants of Health     Financial Resource Strain:     Difficulty of Paying Living Expenses: Not on file   Food Insecurity:     Worried About Running Out of Food in the Last Year: Not on file    Brown of Food in the Last Year: Not on file   Transportation Needs:     Lack of Transportation (Medical): Not on file    Lack of Transportation (Non-Medical): Not on file   Physical Activity:     Days of Exercise per Week: Not on file    Minutes of Exercise per Session: Not on file   Stress:     Feeling of Stress : Not on file   Social Connections:     Frequency of Communication with Friends and Family: Not on file    Frequency of Social Gatherings with Friends and Family: Not on file    Attends Yazidi Services: Not on file    Active Member of [address] [place] [address] [employer] or Organizations: Not on file    Attends Club or Organization Meetings: Not on file    Marital Status: Not on file   Intimate Partner Violence:     Fear of Current or Ex-Partner: Not on file    Emotionally Abused: Not on file    Physically Abused: Not on file    Sexually Abused: Not on file   Housing Stability:     Unable to Pay for Housing in the Last Year: Not on file    Number of [place] in the Last Year: Not on file    Unstable Housing in the Last Year: Not on file     Review of Systems   Respiratory: Negative for shortness of breath. Cardiovascular: Negative for chest pain. Gastrointestinal: Negative for constipation. Genitourinary: Negative for difficulty urinating. Musculoskeletal: Positive for arthralgias, back pain and myalgias. Psychiatric/Behavioral: Positive for sleep disturbance. Objective:   Physical Exam  Vitals reviewed. Constitutional:       General: She is not in acute distress. Appearance: She is well-developed. She is not ill-appearing, toxic-appearing or diaphoretic. Interventions: She is not intubated. HENT:      Head: Normocephalic and atraumatic. Cardiovascular:      Rate and Rhythm: Normal rate.    Pulmonary:      Effort: Pulmonary effort is normal. No tachypnea, bradypnea, accessory muscle usage or respiratory distress. She is not intubated. Skin:     General: Skin is warm and dry. Capillary Refill: Capillary refill takes less than 2 seconds. Coloration: Skin is not pale. Nails: There is no clubbing. Neurological:      Mental Status: She is alert and oriented to person, place, and time. GCS: GCS eye subscore is 4. GCS verbal subscore is 5. GCS motor subscore is 6. Cranial Nerves: No cranial nerve deficit. Psychiatric:         Speech: Speech normal.         Behavior: Behavior normal. Behavior is not agitated, aggressive, withdrawn or combative. Behavior is cooperative. Judgment: Judgment normal. Judgment is not impulsive or inappropriate. Assessment:      1. Lumbar radiculopathy    2. Chronic bilateral low back pain with bilateral sciatica    3. Bulging of lumbar intervertebral disc    4. Lumbar facet joint syndrome          Plan:      Dr. Fam Knows note reviewed.  Will schedule bilateral L5 TFE and then [id number] MBB        Radha Metzger, APRN - CNP

## 2022-04-25 NOTE — TELEPHONE ENCOUNTER
Bilateral L5 TFE with no sedation (only Ativan) scheduled for 5/13/22. Patient confirmed that she is not taking any blood thinner including low dose aspirin. Patient educated to have a  due to taking Ativan. At same time, Bilateral L5-S1 Diagnostic medial Branch Block with no sedation (only Ativan) scheduled for 5/27/22. Patient educated to have  due to taking Ativan. Patient has current Ativan prescription. Surgery center notified.

## 2022-05-13 ENCOUNTER — HOSPITAL ENCOUNTER (OUTPATIENT)
Age: 52
Setting detail: OUTPATIENT SURGERY
Discharge: HOME | End: 2022-05-13
Attending: PHYSICAL MEDICINE & REHABILITATION | Admitting: PHYSICAL MEDICINE & REHABILITATION
Payer: COMMERCIAL

## 2022-05-13 ENCOUNTER — APPOINTMENT (OUTPATIENT)
Dept: GENERAL RADIOLOGY | Age: 52
End: 2022-05-13
Attending: PHYSICAL MEDICINE & REHABILITATION
Payer: COMMERCIAL

## 2022-05-13 VITALS
WEIGHT: 219 LBS | TEMPERATURE: 98.4 F | HEIGHT: 70 IN | DIASTOLIC BLOOD PRESSURE: 60 MMHG | HEART RATE: 64 BPM | RESPIRATION RATE: 16 BRPM | BODY MASS INDEX: 31.35 KG/M2 | SYSTOLIC BLOOD PRESSURE: 116 MMHG | OXYGEN SATURATION: 99 %

## 2022-05-13 PROCEDURE — 2500000003 HC RX 250 WO HCPCS: Performed by: PHYSICAL MEDICINE & REHABILITATION

## 2022-05-13 PROCEDURE — 3600000056 HC PAIN LEVEL 4 BASE: Performed by: PHYSICAL MEDICINE & REHABILITATION

## 2022-05-13 PROCEDURE — 2709999900 HC NON-CHARGEABLE SUPPLY: Performed by: PHYSICAL MEDICINE & REHABILITATION

## 2022-05-13 PROCEDURE — 6360000002 HC RX W HCPCS: Performed by: PHYSICAL MEDICINE & REHABILITATION

## 2022-05-13 PROCEDURE — 64483 NJX AA&/STRD TFRM EPI L/S 1: CPT | Performed by: PHYSICAL MEDICINE & REHABILITATION

## 2022-05-13 PROCEDURE — 7100000010 HC PHASE II RECOVERY - FIRST 15 MIN: Performed by: PHYSICAL MEDICINE & REHABILITATION

## 2022-05-13 PROCEDURE — 2580000003 HC RX 258: Performed by: PHYSICAL MEDICINE & REHABILITATION

## 2022-05-13 PROCEDURE — 3600000057 HC PAIN LEVEL 4 ADDL 15 MIN: Performed by: PHYSICAL MEDICINE & REHABILITATION

## 2022-05-13 PROCEDURE — 6360000004 HC RX CONTRAST MEDICATION: Performed by: PHYSICAL MEDICINE & REHABILITATION

## 2022-05-13 PROCEDURE — 7100000011 HC PHASE II RECOVERY - ADDTL 15 MIN: Performed by: PHYSICAL MEDICINE & REHABILITATION

## 2022-05-13 PROCEDURE — 3209999900 FLUORO FOR SURGICAL PROCEDURES

## 2022-05-13 PROCEDURE — A4216 STERILE WATER/SALINE, 10 ML: HCPCS | Performed by: PHYSICAL MEDICINE & REHABILITATION

## 2022-05-13 RX ORDER — SODIUM CHLORIDE 0.9 % (FLUSH) 0.9 %
5-40 SYRINGE (ML) INJECTION EVERY 12 HOURS SCHEDULED
Status: CANCELLED | OUTPATIENT
Start: 2022-05-13

## 2022-05-13 RX ORDER — BUPIVACAINE HYDROCHLORIDE 5 MG/ML
INJECTION, SOLUTION EPIDURAL; INTRACAUDAL PRN
Status: DISCONTINUED | OUTPATIENT
Start: 2022-05-13 | End: 2022-05-13 | Stop reason: ALTCHOICE

## 2022-05-13 RX ORDER — DEXAMETHASONE SODIUM PHOSPHATE 10 MG/ML
INJECTION INTRAMUSCULAR; INTRAVENOUS PRN
Status: DISCONTINUED | OUTPATIENT
Start: 2022-05-13 | End: 2022-05-13 | Stop reason: ALTCHOICE

## 2022-05-13 RX ORDER — SODIUM CHLORIDE 0.9 % (FLUSH) 0.9 %
5-40 SYRINGE (ML) INJECTION PRN
Status: CANCELLED | OUTPATIENT
Start: 2022-05-13

## 2022-05-13 RX ORDER — SODIUM CHLORIDE 9 MG/ML
INJECTION INTRAVENOUS PRN
Status: DISCONTINUED | OUTPATIENT
Start: 2022-05-13 | End: 2022-05-13 | Stop reason: ALTCHOICE

## 2022-05-13 RX ORDER — SODIUM CHLORIDE 9 MG/ML
INJECTION, SOLUTION INTRAVENOUS PRN
Status: CANCELLED | OUTPATIENT
Start: 2022-05-13

## 2022-05-13 ASSESSMENT — PAIN SCALES - GENERAL: PAINLEVEL_OUTOF10: 2

## 2022-05-13 ASSESSMENT — PAIN DESCRIPTION - DESCRIPTORS
DESCRIPTORS: ACHING
DESCRIPTORS: ACHING

## 2022-05-13 ASSESSMENT — PAIN - FUNCTIONAL ASSESSMENT: PAIN_FUNCTIONAL_ASSESSMENT: 0-10

## 2022-05-13 ASSESSMENT — ENCOUNTER SYMPTOMS
SHORTNESS OF BREATH: 0
CONSTIPATION: 0
BACK PAIN: 1

## 2022-05-13 ASSESSMENT — PAIN DESCRIPTION - ORIENTATION: ORIENTATION: LOWER

## 2022-05-13 ASSESSMENT — PAIN DESCRIPTION - LOCATION: LOCATION: BACK

## 2022-05-13 NOTE — H&P
Subjective:      Patient ID: Marques Kruger is a 50 y.o. female. No chief complaint on file. Back Pain  Pertinent negatives include no chest pain. Hip Pain        Referred back by Dr. Lauren Ponce to schedule spinal injections         Allergies   Allergen Reactions    Mucinex [Guaifenesin Er] Other (See Comments)     Gerd/heartburn       No outpatient medications have been marked as taking for the 5/13/22 encounter [facility] Encounter). Past Medical History:   Diagnosis Date    Anxiety     Chronic low back pain     Disabling.  Dyshidrotic eczema     Of a couple of toes.  Dysmenorrhea     Endometritis     Ingrown toenail     Painful, infected, left hallux, mycotic.  Menorrhagia     Peptic ulcer disease     Right shoulder pain     With consideration to impingement and rotator cuff syndrome.  Tobacco abuse     Toenail fungus     Developing, right hallux. Past Surgical History:   Procedure Laterality Date    APPENDECTOMY      CHOLECYSTECTOMY  01/17/1997    DILATION AND CURETTAGE OF UTERUS      ERCP  01/14/1997    With endoscopic retrograde sphincterotomy and common bile duct stone extraction.  [facility]. INJECTION PROCEDURE FOR SACROILIAC JOINT Bilateral 1/24/2020    Bilateral SACROILIAC & Piriformis INJ's performed by Esperanza Salas MD at [address] Bilateral 2/7/2020    Bilateral SACROILIAC & Piriformis INJ's performed by Esperanza Salas MD at [address] TRUNK,ARM,LEG Right 2/15/2018    Excision Lesion Sebaceous Cyst Right Shoulder performed by Tristin Echeverria MD at [address]  03/30/1999       Family History   Problem Relation Age of Onset    Depression Mother     Anxiety Disorder Mother     Breast Cancer Mother     Cancer Mother     Heart Disease Father     Cancer Father         Pancreatic cancer.     Diabetes Father     High Blood Pressure Father     Depression Brother     Cancer Maternal Grandmother         cervical    Kidney Disease Maternal Grandmother     Other Paternal Grandmother         alzhiemers    Other Son         hydrocephaly    Mental Illness Daughter     Glaucoma Neg Hx     Cataracts Neg Hx        Social History     Socioeconomic History    Marital status: Single     Spouse name: None    Number of children: None    Years of education: None    Highest education level: None   Occupational History    None   Tobacco Use    Smoking status: Current Every Day Smoker     Packs/day: 0.50     Years: 20.00     Pack years: 10.00     Types: Cigarettes    Smokeless tobacco: Never Used   Vaping Use    Vaping Use: Never used   Substance and Sexual Activity    Alcohol use: No     Alcohol/week: 0.0 standard drinks    Drug use: No    Sexual activity: Yes     Partners: Male   Other Topics Concern    None   Social History Narrative    None     Social Determinants of Health     Financial Resource Strain:     Difficulty of Paying Living Expenses: Not on file   Food Insecurity:     Worried About Running Out of Food in the Last Year: Not on file    Brown of Food in the Last Year: Not on file   Transportation Needs:     Lack of Transportation (Medical): Not on file    Lack of Transportation (Non-Medical):  Not on file   Physical Activity:     Days of Exercise per Week: Not on file    Minutes of Exercise per Session: Not on file   Stress:     Feeling of Stress : Not on file   Social Connections:     Frequency of Communication with Friends and Family: Not on file    Frequency of Social Gatherings with Friends and Family: Not on file    Attends Mu-ism Services: Not on file    Active Member of Clubs or Organizations: Not on file    Attends Club or Organization Meetings: Not on file    Marital Status: Not on file   Intimate Partner Violence:     Fear of Current or Ex-Partner: Not on file    Emotionally Abused: Not on file    Physically Abused: Not on file   Debbie Rivera Sexually Abused: Not on file   Housing Stability:     Unable to Pay for Housing in the Last Year: Not on file    Number of [place] in the Last Year: Not on file    Unstable Housing in the Last Year: Not on file     Review of Systems   Respiratory: Negative for shortness of breath. Cardiovascular: Negative for chest pain. Gastrointestinal: Negative for constipation. Genitourinary: Negative for difficulty urinating. Musculoskeletal: Positive for arthralgias, back pain and myalgias. Psychiatric/Behavioral: Positive for sleep disturbance. Objective:   Physical Exam  Vitals reviewed. Constitutional:       General: She is not in acute distress. Appearance: She is well-developed. She is not ill-appearing, toxic-appearing or diaphoretic. Interventions: She is not intubated. HENT:      Head: Normocephalic and atraumatic. Cardiovascular:      Rate and Rhythm: Normal rate. Pulmonary:      Effort: Pulmonary effort is normal. No tachypnea, bradypnea, accessory muscle usage or respiratory distress. She is not intubated. Skin:     General: Skin is warm and dry. Capillary Refill: Capillary refill takes less than 2 seconds. Coloration: Skin is not pale. Nails: There is no clubbing. Neurological:      Mental Status: She is alert and oriented to person, place, and time. GCS: GCS eye subscore is 4. GCS verbal subscore is 5. GCS motor subscore is 6. Cranial Nerves: No cranial nerve deficit. Psychiatric:         Speech: Speech normal.         Behavior: Behavior normal. Behavior is not agitated, aggressive, withdrawn or combative. Behavior is cooperative. Judgment: Judgment normal. Judgment is not impulsive or inappropriate. Assessment:      No diagnosis found. Plan:      Dr. Freeman Conception note reviewed.  Will schedule bilateral L5 TFE and then [id number] MBB        Esperanza Salas MD

## 2022-05-13 NOTE — OP NOTE
TRANSFORAMINAL EPIDURAL STEROID INJECTION    5/13/22  The patient was counseled at length about the risks of tone Covid-19 during their perioperative period and any recovery window from their procedure. The patient was made aware that tone Covid-19  may worsen their prognosis for recovering from their procedure  and lend to a higher morbidity and/or mortality risk. All material risks, benefits, and reasonable alternatives including postponing the procedure were discussed. The patient does wish to proceed with the procedure at this time. Surgeon: Jeri Morillo MD    Pre-operative Diagnosis:   Hospital Problems           Last Modified POA    * (Principal) Lumbar radiculopathy 5/13/2022 Yes    Bulging of lumbar intervertebral disc 5/13/2022 Yes          Post-operative Diagnosis: Same    Assistants: none    This is a 50 y.o. female patient with pain in the Back, left leg, right leg. Previous treatment and examination findings are noted in the H&P. BL5 transforaminal epidural injection has been requested for diagnostic and therapeutic reasons. Conservative treatment was ineffective i.e.: ice, NSAIDS, rest, narcotic medication, chiropractic care, physical therapy and message therapy. Patient is unable to perform the following ADL's: ambulating and grooming     Pain Assessment: 0-10  Pain Level: 3     Pain Orientation: Lower  Pain Location: Back  Pain Descriptors: Aching    Last Plain films: 2020      EXAMINATION:  1. BL5 transforaminal radiculogram/epidurogram.   2. BL5 transforaminal epidural anesthetic injection. 3. BL5 transforaminal epidural steroid injection. CONSENT: Written consent was obtained from the patient on preprinted consent form after explaining the procedure, indications, potential complications and outcomes. Alternative treatments were also discussed. DISCUSSION: The patient was sterilely prepped and draped in the usual fashion in the prone position.  Time out was verified for correct patient, side, level and procedure. SEDATION:   No conscious sedation was performed during the procedure. The patient remained awake and conversed throughout the procedure. The patient underwent pulse oximetry and blood pressure monitoring independently by a trained observer, as well as by a physician. PROCEDURE:  Under image-intensifier control, a 22 gauge needle x 5 inch spinal needle was guided successfully into the epidural space employing a posterior lateral/oblique approach. Needle aspiration was negative for heme or CSF. Instillation of  1 mL of Omnipaque 240 contrast medium opacified the spinal nerve and demonstrated contiguous flow into the epidural space. No vascular spread was noted. Digital subtraction was not employed to evaluate for vascular spread. The patient was monitored for any untoward reaction to contrast medium before proceeding with procedure #2. The patient did not report pain reproduction in a concordant distribution. Following needle position verification, a test dose of .5 mL of sterile lidocaine 0.5% was administered and patient monitored for any adverse effects. Then, 1 mL of   was instilled into the epidural space and the patient's response was again monitored. Finally, Dexamethasone (Decadron 10 mg/mL) 1 ml of 0.5% bupivacaine was then instilled. The patient's response was again monitored. The spinal needle was removed. The patient tolerated the procedure well and without complications and was noted to be in stable condition prior to discharge from the procedure center with discharge instructions. EBL: no blood loss    SPECIMEN: none    IMPRESSIONS:  1. BL5 transforaminal epidurogram, epidural anesthesia and epidural steroid injection procedures accomplished without incident. RECOMMENDATIONS:  1. Complete and return Post-Procedure Pain and Activity Diary.   2.  Contact the [address] for symptom exacerbation, fever or unusual symptoms. 3. Post-procedure care according to verbal and written discharge instructions    POST-PROCEDURE EPIDUROGRAPHY INTERPRETATION:    EXAMINATION: AP, lateral, and oblique views    FLUORO TIME: 13 seconds    DISCUSSION: Spot views of the spine reveal normal alignment and segmentation. Spinal needle is positioned at the BL5 neuroforamin. Contrast spreads and outlines the BL5 nerve/ neuroforamin and epidural space. The epidurogram reveals excellent contrast flow. Visualized spine reveals See radiology report. Soft tissues reveal no abnormalities. IMPRESSION: BL5 transforaminal epidurogram/epineurogram reveals satisfactory needle position and contrast spread.      Electronically signed by Sultana Tanner MD on 5/13/2022 at 12:45 PM

## 2022-05-13 NOTE — INTERVAL H&P NOTE
I have interviewed and examined the patient and reviewed the recent History and Physical.  There have been no changes to the recent H&P documentation. The surgical consent form has been signed. Last anticoagulant medication use was:na    Premedication taken for contrast allergy? No    Valium taken for oral sedation? No    No outpatient medications have been marked as taking for the 5/13/22 encounter [facility] Encounter). The patient understands the planned operation and its associated risks and benefits and agrees to proceed.         Electronically signed by Bon Schwab MD on 5/13/2022 at 12:43 PM

## 2022-05-27 ENCOUNTER — APPOINTMENT (OUTPATIENT)
Dept: GENERAL RADIOLOGY | Age: 52
End: 2022-05-27
Attending: PHYSICAL MEDICINE & REHABILITATION
Payer: COMMERCIAL

## 2022-05-27 ENCOUNTER — HOSPITAL ENCOUNTER (OUTPATIENT)
Age: 52
Setting detail: OUTPATIENT SURGERY
Discharge: HOME | End: 2022-05-27
Attending: PHYSICAL MEDICINE & REHABILITATION | Admitting: PHYSICAL MEDICINE & REHABILITATION
Payer: COMMERCIAL

## 2022-05-27 VITALS
BODY MASS INDEX: 32.44 KG/M2 | OXYGEN SATURATION: 98 % | RESPIRATION RATE: 18 BRPM | WEIGHT: 219 LBS | DIASTOLIC BLOOD PRESSURE: 58 MMHG | HEART RATE: 64 BPM | HEIGHT: 69 IN | SYSTOLIC BLOOD PRESSURE: 117 MMHG | TEMPERATURE: 97 F

## 2022-05-27 PROBLEM — M47.816 LUMBAR SPONDYLOSIS: Status: ACTIVE | Noted: 2022-05-27

## 2022-05-27 PROCEDURE — 7100000010 HC PHASE II RECOVERY - FIRST 15 MIN: Performed by: PHYSICAL MEDICINE & REHABILITATION

## 2022-05-27 PROCEDURE — 3209999900 FLUORO FOR SURGICAL PROCEDURES

## 2022-05-27 PROCEDURE — 7100000011 HC PHASE II RECOVERY - ADDTL 15 MIN: Performed by: PHYSICAL MEDICINE & REHABILITATION

## 2022-05-27 PROCEDURE — 64493 INJ PARAVERT F JNT L/S 1 LEV: CPT | Performed by: PHYSICAL MEDICINE & REHABILITATION

## 2022-05-27 PROCEDURE — 2709999900 HC NON-CHARGEABLE SUPPLY: Performed by: PHYSICAL MEDICINE & REHABILITATION

## 2022-05-27 PROCEDURE — 6360000004 HC RX CONTRAST MEDICATION: Performed by: PHYSICAL MEDICINE & REHABILITATION

## 2022-05-27 PROCEDURE — 2500000003 HC RX 250 WO HCPCS: Performed by: PHYSICAL MEDICINE & REHABILITATION

## 2022-05-27 PROCEDURE — 3600000056 HC PAIN LEVEL 4 BASE: Performed by: PHYSICAL MEDICINE & REHABILITATION

## 2022-05-27 RX ORDER — LIDOCAINE HYDROCHLORIDE 20 MG/ML
INJECTION, SOLUTION EPIDURAL; INFILTRATION; INTRACAUDAL; PERINEURAL PRN
Status: DISCONTINUED | OUTPATIENT
Start: 2022-05-27 | End: 2022-05-27 | Stop reason: ALTCHOICE

## 2022-05-27 RX ORDER — SODIUM CHLORIDE 9 MG/ML
INJECTION, SOLUTION INTRAVENOUS PRN
Status: CANCELLED | OUTPATIENT
Start: 2022-05-27

## 2022-05-27 RX ORDER — SODIUM CHLORIDE 0.9 % (FLUSH) 0.9 %
5-40 SYRINGE (ML) INJECTION PRN
Status: CANCELLED | OUTPATIENT
Start: 2022-05-27

## 2022-05-27 RX ORDER — SODIUM CHLORIDE 0.9 % (FLUSH) 0.9 %
5-40 SYRINGE (ML) INJECTION EVERY 12 HOURS SCHEDULED
Status: CANCELLED | OUTPATIENT
Start: 2022-05-27

## 2022-05-27 ASSESSMENT — ENCOUNTER SYMPTOMS
CONSTIPATION: 0
BACK PAIN: 1
SHORTNESS OF BREATH: 0

## 2022-05-27 ASSESSMENT — PAIN - FUNCTIONAL ASSESSMENT: PAIN_FUNCTIONAL_ASSESSMENT: 0-10

## 2022-05-27 ASSESSMENT — PAIN SCALES - GENERAL: PAINLEVEL_OUTOF10: 3

## 2022-05-27 ASSESSMENT — PAIN DESCRIPTION - LOCATION: LOCATION: BACK

## 2022-05-27 ASSESSMENT — PAIN DESCRIPTION - DESCRIPTORS: DESCRIPTORS: ACHING

## 2022-05-27 ASSESSMENT — PAIN DESCRIPTION - PAIN TYPE: TYPE: CHRONIC PAIN

## 2022-05-27 NOTE — INTERVAL H&P NOTE
I have interviewed and examined the patient and reviewed the recent History and Physical.  There have been no changes to the recent H&P documentation. The surgical consent form has been signed. Last anticoagulant medication use was:na    Premedication taken for contrast allergy? No    Valium taken for oral sedation? No    No outpatient medications have been marked as taking for the 5/27/22 encounter [facility]). The patient understands the planned operation and its associated risks and benefits and agrees to proceed.         Electronically signed by Galen Tolbert MD on 5/27/2022 at 12:10 PM

## 2022-05-27 NOTE — H&P
Subjective:      Patient ID: Randall Rubalcava is a 50 y.o. female. No chief complaint on file. Back Pain  Pertinent negatives include no chest pain. Hip Pain        Referred back by Dr. Florecita John to schedule spinal injections         Allergies   Allergen Reactions    Mucinex [Guaifenesin Er] Other (See Comments)     Gerd/heartburn       No outpatient medications have been marked as taking for the 5/27/22 encounter [facility] Encounter). Past Medical History:   Diagnosis Date    Anxiety     Chronic low back pain     Disabling.  Dyshidrotic eczema     Of a couple of toes.  Dysmenorrhea     Endometritis     Ingrown toenail     Painful, infected, left hallux, mycotic.  Menorrhagia     Peptic ulcer disease     Right shoulder pain     With consideration to impingement and rotator cuff syndrome.  Tobacco abuse     Toenail fungus     Developing, right hallux. Past Surgical History:   Procedure Laterality Date    APPENDECTOMY      CHOLECYSTECTOMY  01/17/1997    DILATION AND CURETTAGE OF UTERUS      ERCP  01/14/1997    With endoscopic retrograde sphincterotomy and common bile duct stone extraction.     [facility]. INJECTION PROCEDURE FOR SACROILIAC JOINT Bilateral 1/24/2020    Bilateral SACROILIAC & Piriformis INJ's performed by Luke Swift MD at [facility] Bilateral 2/7/2020    Bilateral SACROILIAC & Piriformis INJ's performed by Luke Swift MD at [facility] Bilateral 5/13/2022    Bilateral L5 TRANSFORAMINAL performed by Luke Swift MD at [address] 0.6-1CM TRUNK,ARM,LEG Right 2/15/2018    Excision Lesion Sebaceous Cyst Right Shoulder performed by Boaz Draper MD at [address]  03/30/1999       Family History   Problem Relation Age of Onset    Depression Mother     Anxiety Disorder Mother     Breast Cancer Mother     Cancer Mother     Heart Disease Father     Cancer Father         Pancreatic cancer.  Diabetes Father     High Blood Pressure Father     Depression Brother     Cancer Maternal Grandmother         cervical    Kidney Disease Maternal Grandmother     Other Paternal Grandmother         alzhiemers    Other Son         hydrocephaly    Mental Illness Daughter     Glaucoma Neg Hx     Cataracts Neg Hx        Social History     Socioeconomic History    Marital status: Single     Spouse name: None    Number of children: None    Years of education: None    Highest education level: None   Occupational History    None   Tobacco Use    Smoking status: Current Every Day Smoker     Packs/day: 0.50     Years: 20.00     Pack years: 10.00     Types: Cigarettes    Smokeless tobacco: Never Used   Vaping Use    Vaping Use: Never used   Substance and Sexual Activity    Alcohol use: No     Alcohol/week: 0.0 standard drinks    Drug use: No    Sexual activity: Yes     Partners: Male   Other Topics Concern    None   Social History Narrative    None     Social Determinants of Health     Financial Resource Strain:     Difficulty of Paying Living Expenses: Not on file   Food Insecurity:     Worried About Running Out of Food in the Last Year: Not on file    Brown of Food in the Last Year: Not on file   Transportation Needs:     Lack of Transportation (Medical): Not on file    Lack of Transportation (Non-Medical):  Not on file   Physical Activity:     Days of Exercise per Week: Not on file    Minutes of Exercise per Session: Not on file   Stress:     Feeling of Stress : Not on file   Social Connections:     Frequency of Communication with Friends and Family: Not on file    Frequency of Social Gatherings with Friends and Family: Not on file    Attends [employer] Services: Not on file    Active Member of Clubs or Organizations: Not on file    Attends Club or Organization Meetings: Not on file    Marital Status: Not on file   Intimate Partner Violence:     Fear of Current or Ex-Partner: Not on file    Emotionally Abused: Not on file    Physically Abused: Not on file    Sexually Abused: Not on file   Housing Stability:     Unable to Pay for Housing in the Last Year: Not on file    Number of Places Lived in the Last Year: Not on file    Unstable Housing in the Last Year: Not on file     Review of Systems   Respiratory: Negative for shortness of breath. Cardiovascular: Negative for chest pain. Gastrointestinal: Negative for constipation. Genitourinary: Negative for difficulty urinating. Musculoskeletal: Positive for arthralgias, back pain and myalgias. Psychiatric/Behavioral: Positive for sleep disturbance. Objective:   Physical Exam  Vitals reviewed. Constitutional:       General: She is not in acute distress. Appearance: She is well-developed. She is not ill-appearing, toxic-appearing or diaphoretic. Interventions: She is not intubated. HENT:      Head: Normocephalic and atraumatic. Cardiovascular:      Rate and Rhythm: Normal rate. Pulmonary:      Effort: Pulmonary effort is normal. No tachypnea, bradypnea, accessory muscle usage or respiratory distress. She is not intubated. Skin:     General: Skin is warm and dry. Capillary Refill: Capillary refill takes less than 2 seconds. Coloration: Skin is not pale. Nails: There is no clubbing. Neurological:      Mental Status: She is alert and oriented to person, place, and time. GCS: GCS eye subscore is 4. GCS verbal subscore is 5. GCS motor subscore is 6. Cranial Nerves: No cranial nerve deficit. Psychiatric:         Speech: Speech normal.         Behavior: Behavior normal. Behavior is not agitated, aggressive, withdrawn or combative. Behavior is cooperative. Judgment: Judgment normal. Judgment is not impulsive or inappropriate. Assessment:      No diagnosis found. Plan:      Dr. Darian Wells note reviewed.  Will schedule bilateral L5 TFE and then [id number] MBB        Maverick Cabrera MD

## 2022-06-01 NOTE — OP NOTE
MEDIAL BRANCH BLOCK Diagnostic   5/27/22    Surgeon: Brayan Christensen MD  The patient was counseled at length about the risks of tone Covid-19 during their perioperative period and any recovery window from their procedure. The patient was made aware that tone Covid-19  may worsen their prognosis for recovering from their procedure  and lend to a higher morbidity and/or mortality risk. All material risks, benefits, and reasonable alternatives including postponing the procedure were discussed. The patient does wish to proceed with the procedure at this time. Pre-operative Diagnosis:   Hospital Problems           Last Modified POA    * (Principal) Lumbar facet joint syndrome 5/27/2022 Yes    Lumbar spondylosis 5/27/2022 Yes          Post-operative Diagnosis: Same    INDICATION:Please see H&P for details on previous treatments, examination findings, and work up. bilateral L5-S1 medial branch blocks are requested for diagnostic reasons. Conservative treatment was ineffective i.e.: ice, NSAIDS, rest, narcotic medication, chiropractic care, physical therapy and message therapy. Patient is unable to perform the following ADL's: ambulating and grooming     Pain Assessment: 0-10  Pain Level: 4        Pain Location: Back  Pain Descriptors: Aching    Last Plain films: 2020    EXAMINATION:  bilateral B L 5-S1 medial branch blocks. CONSENT:  Written consent was obtained from the patient on preprinted consent form after explaining the procedure, indications, potential complications and outcomes. Alternative treatments were also discussed. DISCUSSION:  The patient was sterilely prepped and draped in the usual fashion in the prone position. Time out was verified for correct patient, side, level and procedure. SEDATION:   No conscious sedation was performed during the procedure. The patient remained awake and conversed throughout the procedure.   The patient underwent pulse oximetry and blood pressure monitoring independently by a trained observer, as well as by a physician. PROCEDURE:   Under image-intensifier control, 22 gauge needle x 5 inch spinal needles were directed into the bilateral B L5-S1 medial branches of the dorsal rami at the target points, according to LEONARDO guidelines. Needle tip positions were confirmed with 0.2 mL of Omnipaque 240 contrast medium. Then, 1mL of equal volumes of 0.75% bupivacaine // 2% lidocaine / and Celestone> were instilled at each site. EBL: no blood loss    SPECIMEN: none    The patient tolerated the procedure well and without complications and was noted to be in stable condition prior to discharge from the procedure center with discharge instructions. IMPRESSIONS:  1.     bilateral B L5-S1 medial branch blocks performed uneventfully. 2.      bilateralB L5-S1 medial branch blocks decreased pain to a 2 on 0 to 10 numeric pain scale at 15 and 30 minutes after the procedure. RECOMMENDATIONS:  1. Complete and return the \"Post-Procedure Pain and Activity Diary.   2. Contact me for symptom exacerbation, fever or unusual symptoms. 4.      Post-procedure care according to verbal and written instructions at discharge. 3. Follow this block with physical therapy, as per MD directions. 4. Consider confirmatory MBB if there is > 80% pain relief and improved activity scores. POST-PROCEDURE LUMBAR/CERVICAL SPINE FLUOROSCOPIC IMAGE INTERPRETATION:    EXAMINATION: AP, lateral, and oblique views. FLUORO TIME: 21 seconds    DISCUSSION:  Spot views of the spine reveal normal alignment and segmentation. Spinal needles are positioned at the base of the SAP at the junction with the transverse process/sacral ala  OR center of  the articular pillars on PA and lateral views. Contrast at needle tip confirms satisfactory placement. Visualized spine reveals bilateral See radiology report. Soft tissues reveal no abnormalities.     IMPRESSION:  Satisfactory needle placement and contrast dispersal for bilateral L5-S1 medial branch block.      Electronically signed by Maverick Cabrera MD on 6/1/2022 at 9:33 AM

## 2022-06-16 ENCOUNTER — TELEPHONE (OUTPATIENT)
Dept: PAIN MANAGEMENT | Age: 52
End: 2022-06-16

## 2022-06-17 ENCOUNTER — TELEPHONE (OUTPATIENT)
Dept: PAIN MANAGEMENT | Age: 52
End: 2022-06-17

## 2022-06-17 NOTE — TELEPHONE ENCOUNTER
Bilateral L5-S1 Confirmatory medial Branch Block with MAC sedation scheduled for 8/9/22 with surgery center notified. Patient Educated to have .

## 2022-06-22 ENCOUNTER — TELEPHONE (OUTPATIENT)
Dept: PAIN MANAGEMENT | Age: 52
End: 2022-06-22

## 2022-06-23 ENCOUNTER — TELEPHONE (OUTPATIENT)
Dept: PAIN MANAGEMENT | Age: 52
End: 2022-06-23

## 2022-06-23 NOTE — TELEPHONE ENCOUNTER
Rom L5-S1 CMBB with MAC sedation rescheduled for 7/8/22 with surgery center notified. [facility]

## 2022-07-08 ENCOUNTER — TELEPHONE (OUTPATIENT)
Dept: PAIN MANAGEMENT | Age: 52
End: 2022-07-08

## 2022-07-08 ENCOUNTER — ANESTHESIA (OUTPATIENT)
Dept: OPERATING ROOM | Age: 52
End: 2022-07-08
Payer: COMMERCIAL

## 2022-07-08 ENCOUNTER — APPOINTMENT (OUTPATIENT)
Dept: GENERAL RADIOLOGY | Age: 52
End: 2022-07-08
Attending: PHYSICAL MEDICINE & REHABILITATION
Payer: COMMERCIAL

## 2022-07-08 ENCOUNTER — HOSPITAL ENCOUNTER (OUTPATIENT)
Age: 52
Setting detail: OUTPATIENT SURGERY
Discharge: HOME | End: 2022-07-08
Attending: PHYSICAL MEDICINE & REHABILITATION | Admitting: PHYSICAL MEDICINE & REHABILITATION
Payer: COMMERCIAL

## 2022-07-08 ENCOUNTER — ANESTHESIA EVENT (OUTPATIENT)
Dept: OPERATING ROOM | Age: 52
End: 2022-07-08
Payer: COMMERCIAL

## 2022-07-08 VITALS
TEMPERATURE: 97.6 F | HEIGHT: 69 IN | HEART RATE: 59 BPM | DIASTOLIC BLOOD PRESSURE: 56 MMHG | OXYGEN SATURATION: 95 % | SYSTOLIC BLOOD PRESSURE: 118 MMHG | BODY MASS INDEX: 31.78 KG/M2 | RESPIRATION RATE: 16 BRPM | WEIGHT: 214.6 LBS

## 2022-07-08 PROCEDURE — 7100000010 HC PHASE II RECOVERY - FIRST 15 MIN: Performed by: PHYSICAL MEDICINE & REHABILITATION

## 2022-07-08 PROCEDURE — 6360000002 HC RX W HCPCS: Performed by: NURSE ANESTHETIST, CERTIFIED REGISTERED

## 2022-07-08 PROCEDURE — 6360000004 HC RX CONTRAST MEDICATION: Performed by: PHYSICAL MEDICINE & REHABILITATION

## 2022-07-08 PROCEDURE — 3209999900 FLUORO FOR SURGICAL PROCEDURES

## 2022-07-08 PROCEDURE — 7100000011 HC PHASE II RECOVERY - ADDTL 15 MIN: Performed by: PHYSICAL MEDICINE & REHABILITATION

## 2022-07-08 PROCEDURE — 64493 INJ PARAVERT F JNT L/S 1 LEV: CPT | Performed by: PHYSICAL MEDICINE & REHABILITATION

## 2022-07-08 PROCEDURE — 2709999900 HC NON-CHARGEABLE SUPPLY: Performed by: PHYSICAL MEDICINE & REHABILITATION

## 2022-07-08 PROCEDURE — 2500000003 HC RX 250 WO HCPCS: Performed by: PHYSICAL MEDICINE & REHABILITATION

## 2022-07-08 PROCEDURE — 3700000000 HC ANESTHESIA ATTENDED CARE: Performed by: PHYSICAL MEDICINE & REHABILITATION

## 2022-07-08 PROCEDURE — 2580000003 HC RX 258: Performed by: PHYSICAL MEDICINE & REHABILITATION

## 2022-07-08 PROCEDURE — 3600000056 HC PAIN LEVEL 4 BASE: Performed by: PHYSICAL MEDICINE & REHABILITATION

## 2022-07-08 RX ORDER — SODIUM CHLORIDE 9 MG/ML
INJECTION, SOLUTION INTRAVENOUS PRN
Status: CANCELLED | OUTPATIENT
Start: 2022-07-08

## 2022-07-08 RX ORDER — SODIUM CHLORIDE 0.9 % (FLUSH) 0.9 %
5-40 SYRINGE (ML) INJECTION PRN
Status: CANCELLED | OUTPATIENT
Start: 2022-07-08

## 2022-07-08 RX ORDER — SODIUM CHLORIDE 0.9 % (FLUSH) 0.9 %
5-40 SYRINGE (ML) INJECTION EVERY 12 HOURS SCHEDULED
Status: CANCELLED | OUTPATIENT
Start: 2022-07-08

## 2022-07-08 RX ORDER — SODIUM CHLORIDE, SODIUM LACTATE, POTASSIUM CHLORIDE, CALCIUM CHLORIDE 600; 310; 30; 20 MG/100ML; MG/100ML; MG/100ML; MG/100ML
INJECTION, SOLUTION INTRAVENOUS CONTINUOUS
Status: DISCONTINUED | OUTPATIENT
Start: 2022-07-08 | End: 2022-07-08 | Stop reason: HOSPADM

## 2022-07-08 RX ORDER — SODIUM CHLORIDE, SODIUM LACTATE, POTASSIUM CHLORIDE, CALCIUM CHLORIDE 600; 310; 30; 20 MG/100ML; MG/100ML; MG/100ML; MG/100ML
INJECTION, SOLUTION INTRAVENOUS CONTINUOUS
Status: CANCELLED | OUTPATIENT
Start: 2022-07-08

## 2022-07-08 RX ORDER — BUPIVACAINE HYDROCHLORIDE 7.5 MG/ML
INJECTION, SOLUTION EPIDURAL; RETROBULBAR PRN
Status: DISCONTINUED | OUTPATIENT
Start: 2022-07-08 | End: 2022-07-08 | Stop reason: ALTCHOICE

## 2022-07-08 RX ORDER — PROPOFOL 10 MG/ML
INJECTION, EMULSION INTRAVENOUS CONTINUOUS PRN
Status: DISCONTINUED | OUTPATIENT
Start: 2022-07-08 | End: 2022-07-08 | Stop reason: SDUPTHER

## 2022-07-08 RX ADMIN — PROPOFOL 250 MCG/KG/MIN: 10 INJECTION, EMULSION INTRAVENOUS at 11:13

## 2022-07-08 RX ADMIN — SODIUM CHLORIDE, POTASSIUM CHLORIDE, SODIUM LACTATE AND CALCIUM CHLORIDE: 600; 310; 30; 20 INJECTION, SOLUTION INTRAVENOUS at 10:50

## 2022-07-08 ASSESSMENT — PAIN DESCRIPTION - LOCATION: LOCATION: BACK

## 2022-07-08 ASSESSMENT — PAIN - FUNCTIONAL ASSESSMENT: PAIN_FUNCTIONAL_ASSESSMENT: 0-10

## 2022-07-08 ASSESSMENT — ENCOUNTER SYMPTOMS
BACK PAIN: 1
CONSTIPATION: 0
EYES NEGATIVE: 1
ALLERGIC/IMMUNOLOGIC NEGATIVE: 1
DIARRHEA: 0
RESPIRATORY NEGATIVE: 1

## 2022-07-08 ASSESSMENT — PAIN SCALES - GENERAL
PAINLEVEL_OUTOF10: 3
PAINLEVEL_OUTOF10: 3

## 2022-07-08 ASSESSMENT — PAIN DESCRIPTION - PAIN TYPE: TYPE: CHRONIC PAIN;SURGICAL PAIN

## 2022-07-08 ASSESSMENT — PAIN DESCRIPTION - DESCRIPTORS: DESCRIPTORS: ACHING;DISCOMFORT

## 2022-07-08 ASSESSMENT — PAIN DESCRIPTION - ORIENTATION: ORIENTATION: LOWER

## 2022-07-08 NOTE — TELEPHONE ENCOUNTER
Bilateral L5-S1 CMBB done today. Rt L5-S1 RFA  with MAC  sedation scheduled for 8/22/22 with surgery center notified. .     Lt L5-S1 RFA  with MAC  sedation scheduled for 9/6/22 with surgery center notified. Pinky Angles

## 2022-07-08 NOTE — FLOWSHEET NOTE
07/08/22 1132   Encounter Summary   Encounter Overview/Reason  Initial Encounter   Service Provided For: Patient   Referral/Consult From: Kanchan   Last Encounter  07/08/22   Complexity of Encounter Low   Begin Time 1125   End Time  1133   Total Time Calculated 8 min   Encounter    Type Post-Procedural   Assessment/Intervention/Outcome   Assessment Calm   Intervention Sustaining Presence/Ministry of presence   Outcome Engaged in conversation;Expressed Gratitude

## 2022-07-08 NOTE — OP NOTE
MEDIAL BRANCH BLOCK   confirm  7/8/22    Surgeon: Dre Sanches MD    Pre-operative Diagnosis:   Hospital Problems           Last Modified POA    * (Principal) Lumbar facet joint syndrome 7/8/2022 Yes    Lumbar spondylosis 7/8/2022 Yes          Post-operative Diagnosis: Same    INDICATION:Please see H&P for details on previous treatments, examination findings, and work up. bilateral L5-S1 medial branch blocks are requested for diagnostic reasons. Conservative treatment was ineffective i.e.: ice, NSAIDS, rest, narcotic medication, chiropractic care, physical therapy and message therapy. Patient is unable to perform the following ADL's: personal cares, ambulating and grooming     Pain Assessment: 0-10  Pain Level: 3     Pain Orientation: Lower  Pain Location: Back  Pain Descriptors: Aching,Discomfort    Last Plain films: 2020    EXAMINATION:  bilateral L5-S1 medial branch blocks. CONSENT:  Written consent was obtained from the patient on preprinted consent form after explaining the procedure, indications, potential complications and outcomes. Alternative treatments were also discussed. DISCUSSION:  The patient was sterilely prepped and draped in the usual fashion in the prone position. Time out was verified for correct patient, side, level and procedure. SEDATION:   No conscious sedation was performed during the procedure. The patient remained awake and conversed throughout the procedure. The patient underwent pulse oximetry and blood pressure monitoring independently by a trained observer, as well as by a physician. PROCEDURE:   Under image-intensifier control, 22 gauge needle x 5 inch spinal needles were directed into the bilateral L5-S1 medial branches of the dorsal rami at the target points, according to LEONARDO guidelines. Needle tip positions were confirmed with 0.2 mL of Omnipaque 240 contrast medium.  Then, 1mL of equal volumes of 0.75% bupivacaine // 2% lidocaine / and Celestone> were instilled at each site. EBL: no blood loss    SPECIMEN: none    The patient tolerated the procedure well and without complications and was noted to be in stable condition prior to discharge from the procedure center with discharge instructions. IMPRESSIONS:  1.     bilateral L5-S1 medial branch blocks performed uneventfully. 2.      bilateralL5-S1 medial branch blocks decreased pain to a 2 on 0 to 10 numeric pain scale at 15 and 30 minutes after the procedure. RECOMMENDATIONS:  1. Complete and return the \"Post-Procedure Pain and Activity Diary.   2. Contact me for symptom exacerbation, fever or unusual symptoms. 4.      Post-procedure care according to verbal and written instructions at discharge. 3. Follow this block with physical therapy, as per MD directions. 4. Consider confirmatory MBB if there is > 80% pain relief and improved activity scores. POST-PROCEDURE LUMBAR/CERVICAL SPINE FLUOROSCOPIC IMAGE INTERPRETATION:    EXAMINATION: AP, lateral, and oblique views. FLUORO TIME: 23 seconds    DISCUSSION:  Spot views of the spine reveal normal alignment and segmentation. Spinal needles are positioned at the base of the SAP at the junction with the transverse process/sacral ala  OR center of  the articular pillars on PA and lateral views. Contrast at needle tip confirms satisfactory placement. Visualized spine reveals bilateral See radiology report. Soft tissues reveal no abnormalities. IMPRESSION:  Satisfactory needle placement and contrast dispersal for bilateral S1 medial branch block.      Electronically signed by Tae Kaminski MD on 7/8/2022 at 11:13 AM

## 2022-07-08 NOTE — PROGRESS NOTES
rounding on Surgery    Assessment: Patient is beginning to wake after procedure. Spoke briefly with . Intervention: Engaged in conversation. Patient expressed appreciation for visit and offer of continued prayer. Plan: Chaplains are available on site or on call 24/7 for spiritual and emotional support.

## 2022-07-08 NOTE — INTERVAL H&P NOTE
I have interviewed and examined the patient and reviewed the recent History and Physical.  There have been no changes to the recent H&P documentation. The surgical consent form has been signed. Last anticoagulant medication use was:na    Premedication taken for contrast allergy? No    Valium taken for oral sedation? No    No outpatient medications have been marked as taking for the 7/8/22 encounter [facility] Encounter). The patient understands the planned operation and its associated risks and benefits and agrees to proceed.         Electronically signed by Madhuri Frederick MD on 7/8/2022 at 11:11 AM

## 2022-07-08 NOTE — H&P
Subjective:       Patient is here today for a diagnostic/therapeutic injection. 7/8/22    Active Hospital Problems    Diagnosis Date Noted    Lumbar spondylosis [M47.816] 05/27/2022     Priority: Medium    Lumbar facet joint syndrome [M47.816] 04/25/2022     Priority: Medium       HPI: Patient is here today for adiagnostic/therapeutic injection. The most recent [facility] office visit notes have been reviewed and are unchanged. Review of Systems   Constitutional: Positive for fatigue. HENT: Negative. Eyes: Negative. Respiratory: Negative. Cardiovascular: Negative. Gastrointestinal: Negative for constipation and diarrhea. Endocrine: Negative. Genitourinary: Negative for difficulty urinating and flank pain. Musculoskeletal: Positive for back pain and myalgias. Skin: Negative. Allergic/Immunologic: Negative. Neurological: Positive for weakness and numbness. Hematological: Negative. Psychiatric/Behavioral: Positive for sleep disturbance. Allergies   Allergen Reactions    Mucinex [Guaifenesin Er] Other (See Comments)     Gerd/heartburn          Prior to Admission medications    Medication Sig Start Date End Date Taking? Authorizing Provider   amitriptyline (ELAVIL) 25 MG tablet Indications: per Dr Brooklyn Decker  1/6/21   Historical Provider, MD   Cholecalciferol (VITAMIN D) 50 MCG (2000 UT) CAPS capsule Take by mouth daily    Historical Provider, MD   Multiple Vitamins-Minerals (THERAPEUTIC MULTIVITAMIN-MINERALS) tablet Take 1 tablet by mouth daily    Historical Provider, MD   meloxicam (MOBIC) 15 MG tablet take 1 tablet by mouth once daily 2/6/20   [place], [place]       Past Medical History:   Diagnosis Date    Anxiety     Chronic low back pain     Disabling.  Dyshidrotic eczema     Of a couple of toes.  Dysmenorrhea     Endometritis     Ingrown toenail     Painful, infected, left hallux, mycotic.     Menorrhagia     Peptic ulcer disease     Right shoulder pain     With consideration to impingement and rotator cuff syndrome.  Tobacco abuse     Toenail fungus     Developing, right hallux. Past Surgical History:   Procedure Laterality Date    APPENDECTOMY      CHOLECYSTECTOMY  01/17/1997    DILATION AND CURETTAGE OF UTERUS      ERCP  01/14/1997    With endoscopic retrograde sphincterotomy and common bile duct stone extraction.  [facility]. INJECTION PROCEDURE FOR SACROILIAC JOINT Bilateral 1/24/2020    Bilateral SACROILIAC & Piriformis INJ's performed by Tahira Huntley MD at [address] Bilateral 2/7/2020    Bilateral SACROILIAC & Piriformis INJ's performed by Tahira Huntley MD at [address] Bilateral 5/27/2022    Bilateral L5-S1 Diagnostic Medial Branch Block performed by Tahira Huntley MD at [address] Bilateral 5/13/2022    Bilateral L5 TRANSFORAMINAL [facility] MD [address] TRUNK,ARM,LEG Right 2/15/2018    Excision Lesion Sebaceous Cyst Right Shoulder performed by Zoë Rabago MD at [address]  03/30/1999       Family andSocial History reviewed in the electronic medical record. Imaging: Reviewed available imaging in oursystem with the patient. No results found. Objective:     Vitals:    07/08/22 1038   BP: (!) 120/57   Pulse: 65   Resp: 16   Temp: 97.5 °F (36.4 °C)   SpO2: 98%          Physical Exam  Constitutional:       General: She is not in acute distress. Appearance: Normal appearance. She is well-developed. She is not diaphoretic. HENT:      Head: Normocephalic and atraumatic. Right Ear: External ear normal. No decreased hearing noted. Left Ear: External ear normal. No decreased hearing noted. Nose: Nose normal.   Eyes:      General: Lids are normal. No scleral icterus.      Conjunctiva/sclera: Conjunctivae normal.   Neck:      Trachea: Phonation normal.   Cardiovascular:      Comments: No BLE edema present  Pulmonary:      Effort: Pulmonary effort is normal. No accessory muscle usage or respiratory distress. Abdominal:      General: Abdomen is flat. Palpations: Abdomen is soft. Genitourinary:     Comments: deferred  Musculoskeletal:      Lumbar back: Negative right straight leg raise test and negative left straight leg raise test.   Skin:     General: Skin is warm and dry. Coloration: Skin is not pale. Findings: No erythema or rash. Neurological:      General: No focal deficit present. Mental Status: She is alert and oriented to person, place, and time. Psychiatric:         Mood and Affect: Mood normal.         Speech: Speech normal.         Behavior: Behavior normal.       Neurologic Exam     Mental Status   Oriented to person, place, and time. Speech: speech is normal     Motor Exam     Strength   Right quadriceps: 5/5  Left quadriceps: 5/5  Right hamstrin/5  Left hamstrin/5    Back Exam     Tenderness   The patient is experiencing tenderness in the lumbar.     Range of Motion   Extension: normal   Flexion: normal   Lateral bend right: normal   Lateral bend left: normal   Rotation right: normal   Rotation left: normal     Muscle Strength   Right Quadriceps:  5/5   Left Quadriceps:  5/5   Right Hamstrings:  5/5   Left Hamstrings:  5/5     Tests   Straight leg raise right: negative  Straight leg raise left: negative    Other   Toe walk: normal  Heel walk: normal  Sensation: normal  Gait: normal             Lab Results   Component Value Date    WBC 7.49 2020    HGB 12.2 2020    HCT 36.7 2020     2020    CHOL 221 (H) 2021    TRIG 199 (H) 2021    HDL 47 2021    ALT 10 2021    AST 15 2021     2021    K 4.2 2021     2021    CREATININE 0.63 2021    BUN 16 2021    CO2 30 2021    TSH 2.46 11/10/2020    INR 0.9 (L) 08/06/2013       Assessment:                            Active Hospital Problems    Diagnosis Date Noted    Lumbar spondylosis [M47.816] 05/27/2022     Priority: Medium    Lumbar facet joint syndrome [M47.816] 04/25/2022     Priority: Medium                  Plan:   Proceed with planned procedure  -   Rom L5-S1  Confirm MBB  The patientunderstands the planned operation and its associated risks and benefits and agrees to proceed. The surgical consent form has been signed. Last NSAID/anticoagulant medication use was: Christi Gip Premedication taken for contrast allergy? No    Valium taken for oral sedation?  No

## 2022-07-08 NOTE — ANESTHESIA POSTPROCEDURE EVALUATION
Department of Anesthesiology  Postprocedure Note    Patient: Khoa Siegel  MRN: [id number]  YOB: 1970  Date of evaluation: 7/8/2022      Procedure Summary     Date: 07/08/22 Room / Location: [address] [place] [address]    Anesthesia Start: 1109 Anesthesia Stop: 4265    Procedure: Bilateral L5-S1 Confirmatory Medal Branch Block (Bilateral ) Diagnosis:       Lumbar facet joint syndrome      Panniculitis involving lumbar region      (Lumbar facet joint syndrome [M47.816])      (Panniculitis involving lumbar region [M54.06])    Surgeons: Florecita Vega MD Responsible Provider: ELISSA Rollins CRNA    Anesthesia Type: general ASA Status: 2          Anesthesia Type: No value filed.     Niesha Phase I: Niesha Score: 9    Niesha Phase II:        Anesthesia Post Evaluation    Patient location during evaluation: bedside  Patient participation: complete - patient participated  Level of consciousness: awake and alert  Pain score: 0  Airway patency: patent  Nausea & Vomiting: no vomiting  Complications: no  Cardiovascular status: hemodynamically stable  Respiratory status: acceptable  Hydration status: euvolemic

## 2022-07-08 NOTE — ANESTHESIA PRE PROCEDURE
Department of Anesthesiology  Preprocedure Note       Name:  Rashida Howell   Age:  50 y.o.  :  1970                                          MRN:  [id number]         Date:  2022      Surgeon: Byron Walker):  Bev Madden MD    Procedure: Procedure(s):  Bilateral L5-S1 Confirmatory Medal Branch Block    Medications prior to admission:   Prior to Admission medications    Medication Sig Start Date End Date Taking? Authorizing Provider   amitriptyline (ELAVIL) 25 MG tablet Indications: per Dr Emily Lopez  21   Historical Provider, MD   Cholecalciferol (VITAMIN D) 50 MCG (2000) CAPS capsule Take by mouth daily    Historical Provider, MD   Multiple Vitamins-Minerals (THERAPEUTIC MULTIVITAMIN-MINERALS) tablet Take 1 tablet by mouth daily    Historical Provider, MD   meloxicam (MOBIC) 15 MG tablet take 1 tablet by mouth once daily 20   CHARLY Espinosa       Current medications:    No current facility-administered medications for this encounter. Allergies: Allergies   Allergen Reactions    Mucinex [Guaifenesin Er] Other (See Comments)     Gerd/heartburn       Problem List:    Patient Active Problem List   Diagnosis Code    Tobacco abuse Z72.0    Anxiety F41.9    Menorrhagia N92.0    Dysmenorrhea N94.6    Peptic ulcer disease K27.9    Dyshidrotic eczema L30.1    Right shoulder pain M25.511    Chronic low back pain M54.50, G89.29    Sacroiliitis (HCC) M46.1    Pain of both sacroiliac joints M53.3    Piriformis syndrome, right G57.01    Piriformis syndrome, left G57.02    Generalized anxiety disorder F41.1    Vitamin D deficiency E55.9    Fibromyalgia M79.7    Vitamin B12 deficiency E53.8    Lumbar radiculopathy M54.16    Bulging of lumbar intervertebral disc M51.26    Lumbar facet joint syndrome M47.816    Lumbar spondylosis M47.816       Past Medical History:        Diagnosis Date    Anxiety     Chronic low back pain     Disabling.     Dyshidrotic eczema     Of a couple of toes.  Dysmenorrhea     Endometritis     Ingrown toenail     Painful, infected, left hallux, mycotic.  Menorrhagia     Peptic ulcer disease     Right shoulder pain     With consideration to impingement and rotator cuff syndrome.  Tobacco abuse     Toenail fungus     Developing, right hallux. Past Surgical History:        Procedure Laterality Date    APPENDECTOMY      CHOLECYSTECTOMY  01/17/1997    DILATION AND CURETTAGE OF UTERUS      ERCP  01/14/1997    With endoscopic retrograde sphincterotomy and common bile duct stone extraction.     [facility] INJECTION PROCEDURE FOR SACROILIAC JOINT Bilateral 1/24/2020    Bilateral SACROILIAC & Piriformis INJ's performed by Thomas Ty MD at [facility] [address] [place] [address] Bilateral 2/7/2020    Bilateral SACROILIAC & Piriformis INJ's performed by Thomas Ty MD at [address] [facility] Bilateral 5/27/2022    Bilateral L5-S1 Diagnostic Medial Branch Block performed by Thomas Ty MD at [facility] Bilateral 5/13/2022    Bilateral L5 TRANSFORAMINAL performed by Thomas Ty MD at [address] [facility] 0.6-1CM TRUNK,ARM,LEG Right 2/15/2018    Excision Lesion Sebaceous Cyst Right Shoulder performed by Berry Chavez MD at [address] [facility]  03/30/1999       Social History:    Social History     Tobacco Use    Smoking status: Current Every Day Smoker     Packs/day: 0.50     Years: 20.00     Pack years: 10.00     Types: Cigarettes    Smokeless tobacco: Never Used   Substance Use Topics    Alcohol use: No     Alcohol/week: 0.0 standard drinks                                Ready to quit: Not Answered  Counseling given: Not Answered      Vital Signs (Current):   Vitals:    07/08/22 1038   BP: (!) 120/57   Pulse: 65   Resp: 16   Temp: 36.4 °C (97.5 °F)   TempSrc: Temporal   SpO2: 98%   Weight: 214 lb 9.6 oz (97.3 kg)   Height: 5' 9\" (1.753 m) BP Readings from Last 3 Encounters:   07/08/22 (!) 120/57   05/27/22 (!) 117/58   05/13/22 116/60       NPO Status: Time of last liquid consumption: 0000                        Time of last solid consumption: 2130                        Date of last liquid consumption: 07/08/22                        Date of last solid food consumption: 07/07/22    BMI:   Wt Readings from Last 3 Encounters:   07/08/22 214 lb 9.6 oz (97.3 kg)   05/27/22 219 lb (99.3 kg)   05/13/22 219 lb (99.3 kg)     Body mass index is 31.69 kg/m². CBC:   Lab Results   Component Value Date/Time    WBC 7.49 09/09/2020 12:00 AM    RBC 9.86 09/09/2020 12:00 AM    HGB 12.2 09/09/2020 12:00 AM    HCT 36.7 09/09/2020 12:00 AM    MCV 95.3 09/09/2020 12:00 AM    RDW 14.0 10/14/2019 02:43 PM     09/09/2020 12:00 AM       CMP:   Lab Results   Component Value Date/Time     05/05/2021 10:34 AM    K 4.2 05/05/2021 10:34 AM     05/05/2021 10:34 AM    CO2 30 05/05/2021 10:34 AM    BUN 16 05/05/2021 10:34 AM    CREATININE 0.63 05/05/2021 10:34 AM    GFRAA >60 05/05/2021 10:34 AM    LABGLOM >60 05/05/2021 10:34 AM    GLUCOSE 81 05/05/2021 10:34 AM    PROT 7.1 05/05/2021 10:34 AM    CALCIUM 9.0 05/05/2021 10:34 AM    BILITOT 0.80 05/05/2021 10:34 AM    ALKPHOS 77 05/05/2021 10:34 AM    AST 15 05/05/2021 10:34 AM    ALT 10 05/05/2021 10:34 AM       POC Tests: No results for input(s): POCGLU, POCNA, POCK, POCCL, POCBUN, POCHEMO, POCHCT in the last 72 hours.     Coags:   Lab Results   Component Value Date/Time    PROTIME 10.0 08/06/2013 01:02 PM    INR 0.9 08/06/2013 01:02 PM       HCG (If Applicable):   Lab Results   Component Value Date    PREGTESTUR NEGATIVE 02/15/2018        ABGs: No results found for: PHART, PO2ART, XGT0LYZ, DHE5PFE, BEART, G6WIBMKP     Type & Screen (If Applicable):  No results found for: LABABO, LABRH    Drug/Infectious Status (If Applicable):  Lab Results   Component Value Date/Time    HEPCAB NONREACTIVE 05/05/2021 10:34 AM       COVID-19 Screening (If Applicable):   Lab Results   Component Value Date/Time    COVID19 Not Detected 10/09/2020 02:50 PM           Anesthesia Evaluation  Patient summary reviewed and Nursing notes reviewed no history of anesthetic complications:   Airway: Mallampati: II  TM distance: >3 FB   Neck ROM: full  Mouth opening: > = 3 FB   Dental:    (+) upper dentures      Pulmonary:Negative Pulmonary ROS and normal exam                               Cardiovascular:  Exercise tolerance: no interval change,                     Neuro/Psych:   (+) neuromuscular disease:, psychiatric history: stable with treatmentdepression/anxiety             GI/Hepatic/Renal:   (+) PUD,           Endo/Other:    (+) : arthritis:., .                 Abdominal:             Vascular: Other Findings:           Anesthesia Plan      general     ASA 2       Induction: intravenous. Anesthetic plan and risks discussed with patient. Use of blood products discussed with patient whom consented to blood products.    Plan discussed with surgical team.                    ELISSA Bernal - CRNA   7/8/2022

## 2022-08-22 ENCOUNTER — ANESTHESIA (OUTPATIENT)
Dept: OPERATING ROOM | Age: 52
End: 2022-08-22
Payer: COMMERCIAL

## 2022-08-22 ENCOUNTER — HOSPITAL ENCOUNTER (OUTPATIENT)
Age: 52
Setting detail: OUTPATIENT SURGERY
Discharge: HOME | End: 2022-08-22
Attending: PHYSICAL MEDICINE & REHABILITATION | Admitting: PHYSICAL MEDICINE & REHABILITATION
Payer: COMMERCIAL

## 2022-08-22 ENCOUNTER — ANESTHESIA EVENT (OUTPATIENT)
Dept: OPERATING ROOM | Age: 52
End: 2022-08-22
Payer: COMMERCIAL

## 2022-08-22 ENCOUNTER — APPOINTMENT (OUTPATIENT)
Dept: GENERAL RADIOLOGY | Age: 52
End: 2022-08-22
Attending: PHYSICAL MEDICINE & REHABILITATION
Payer: COMMERCIAL

## 2022-08-22 VITALS
HEART RATE: 59 BPM | OXYGEN SATURATION: 100 % | DIASTOLIC BLOOD PRESSURE: 58 MMHG | BODY MASS INDEX: 31.1 KG/M2 | SYSTOLIC BLOOD PRESSURE: 121 MMHG | TEMPERATURE: 96.9 F | RESPIRATION RATE: 16 BRPM | HEIGHT: 69 IN | WEIGHT: 210 LBS

## 2022-08-22 PROCEDURE — 6360000002 HC RX W HCPCS: Performed by: NURSE ANESTHETIST, CERTIFIED REGISTERED

## 2022-08-22 PROCEDURE — 7100000010 HC PHASE II RECOVERY - FIRST 15 MIN: Performed by: PHYSICAL MEDICINE & REHABILITATION

## 2022-08-22 PROCEDURE — 6360000002 HC RX W HCPCS: Performed by: PHYSICAL MEDICINE & REHABILITATION

## 2022-08-22 PROCEDURE — 2500000003 HC RX 250 WO HCPCS: Performed by: NURSE ANESTHETIST, CERTIFIED REGISTERED

## 2022-08-22 PROCEDURE — 2709999900 HC NON-CHARGEABLE SUPPLY: Performed by: PHYSICAL MEDICINE & REHABILITATION

## 2022-08-22 PROCEDURE — 3600000056 HC PAIN LEVEL 4 BASE: Performed by: PHYSICAL MEDICINE & REHABILITATION

## 2022-08-22 PROCEDURE — 2580000003 HC RX 258: Performed by: PHYSICAL MEDICINE & REHABILITATION

## 2022-08-22 PROCEDURE — 7100000011 HC PHASE II RECOVERY - ADDTL 15 MIN: Performed by: PHYSICAL MEDICINE & REHABILITATION

## 2022-08-22 PROCEDURE — 3600000057 HC PAIN LEVEL 4 ADDL 15 MIN: Performed by: PHYSICAL MEDICINE & REHABILITATION

## 2022-08-22 PROCEDURE — 64635 DESTROY LUMB/SAC FACET JNT: CPT | Performed by: PHYSICAL MEDICINE & REHABILITATION

## 2022-08-22 PROCEDURE — 3209999900 FLUORO FOR SURGICAL PROCEDURES

## 2022-08-22 PROCEDURE — 2500000003 HC RX 250 WO HCPCS: Performed by: PHYSICAL MEDICINE & REHABILITATION

## 2022-08-22 RX ORDER — PROPOFOL 10 MG/ML
INJECTION, EMULSION INTRAVENOUS PRN
Status: DISCONTINUED | OUTPATIENT
Start: 2022-08-22 | End: 2022-08-22 | Stop reason: SDUPTHER

## 2022-08-22 RX ORDER — SODIUM CHLORIDE 9 MG/ML
INJECTION, SOLUTION INTRAVENOUS PRN
Status: DISCONTINUED | OUTPATIENT
Start: 2022-08-22 | End: 2022-08-22 | Stop reason: HOSPADM

## 2022-08-22 RX ORDER — LIDOCAINE HYDROCHLORIDE 20 MG/ML
INJECTION, SOLUTION INFILTRATION; PERINEURAL PRN
Status: DISCONTINUED | OUTPATIENT
Start: 2022-08-22 | End: 2022-08-22 | Stop reason: SDUPTHER

## 2022-08-22 RX ORDER — SODIUM CHLORIDE, SODIUM LACTATE, POTASSIUM CHLORIDE, CALCIUM CHLORIDE 600; 310; 30; 20 MG/100ML; MG/100ML; MG/100ML; MG/100ML
INJECTION, SOLUTION INTRAVENOUS CONTINUOUS
Status: DISCONTINUED | OUTPATIENT
Start: 2022-08-22 | End: 2022-08-22 | Stop reason: HOSPADM

## 2022-08-22 RX ORDER — DEXAMETHASONE SODIUM PHOSPHATE 10 MG/ML
INJECTION INTRAMUSCULAR; INTRAVENOUS PRN
Status: DISCONTINUED | OUTPATIENT
Start: 2022-08-22 | End: 2022-08-22 | Stop reason: ALTCHOICE

## 2022-08-22 RX ORDER — SODIUM CHLORIDE 0.9 % (FLUSH) 0.9 %
5-40 SYRINGE (ML) INJECTION PRN
Status: DISCONTINUED | OUTPATIENT
Start: 2022-08-22 | End: 2022-08-22 | Stop reason: HOSPADM

## 2022-08-22 RX ORDER — BUPIVACAINE HYDROCHLORIDE 5 MG/ML
INJECTION, SOLUTION EPIDURAL; INTRACAUDAL PRN
Status: DISCONTINUED | OUTPATIENT
Start: 2022-08-22 | End: 2022-08-22 | Stop reason: ALTCHOICE

## 2022-08-22 RX ORDER — LIDOCAINE HYDROCHLORIDE 20 MG/ML
INJECTION, SOLUTION INFILTRATION; PERINEURAL PRN
Status: DISCONTINUED | OUTPATIENT
Start: 2022-08-22 | End: 2022-08-22 | Stop reason: ALTCHOICE

## 2022-08-22 RX ORDER — SODIUM CHLORIDE 0.9 % (FLUSH) 0.9 %
5-40 SYRINGE (ML) INJECTION EVERY 12 HOURS SCHEDULED
Status: DISCONTINUED | OUTPATIENT
Start: 2022-08-22 | End: 2022-08-22 | Stop reason: HOSPADM

## 2022-08-22 RX ADMIN — PROPOFOL 30 MG: 10 INJECTION, EMULSION INTRAVENOUS at 09:30

## 2022-08-22 RX ADMIN — PROPOFOL 30 MG: 10 INJECTION, EMULSION INTRAVENOUS at 09:37

## 2022-08-22 RX ADMIN — LIDOCAINE HYDROCHLORIDE 100 MG: 20 INJECTION, SOLUTION EPIDURAL; INFILTRATION; INTRACAUDAL; PERINEURAL at 09:25

## 2022-08-22 RX ADMIN — PROPOFOL 50 MG: 10 INJECTION, EMULSION INTRAVENOUS at 09:29

## 2022-08-22 RX ADMIN — PROPOFOL 30 MG: 10 INJECTION, EMULSION INTRAVENOUS at 09:34

## 2022-08-22 RX ADMIN — SODIUM CHLORIDE, POTASSIUM CHLORIDE, SODIUM LACTATE AND CALCIUM CHLORIDE: 600; 310; 30; 20 INJECTION, SOLUTION INTRAVENOUS at 08:42

## 2022-08-22 RX ADMIN — PROPOFOL 30 MG: 10 INJECTION, EMULSION INTRAVENOUS at 09:31

## 2022-08-22 RX ADMIN — PROPOFOL 30 MG: 10 INJECTION, EMULSION INTRAVENOUS at 09:35

## 2022-08-22 ASSESSMENT — PAIN SCALES - GENERAL
PAINLEVEL_OUTOF10: 0
PAINLEVEL_OUTOF10: 2

## 2022-08-22 ASSESSMENT — PAIN DESCRIPTION - DESCRIPTORS
DESCRIPTORS: NAGGING
DESCRIPTORS: PRESSURE

## 2022-08-22 ASSESSMENT — ENCOUNTER SYMPTOMS
EYES NEGATIVE: 1
RESPIRATORY NEGATIVE: 1
ALLERGIC/IMMUNOLOGIC NEGATIVE: 1
DIARRHEA: 0
CONSTIPATION: 0
BACK PAIN: 1

## 2022-08-22 ASSESSMENT — PAIN - FUNCTIONAL ASSESSMENT: PAIN_FUNCTIONAL_ASSESSMENT: 0-10

## 2022-08-22 ASSESSMENT — PAIN DESCRIPTION - ORIENTATION: ORIENTATION: LOWER;RIGHT

## 2022-08-22 NOTE — DISCHARGE INSTRUCTIONS
Home Care after Radiofrequency Ablation    The doctor has done a radiofrequency procedure to disrupt the nerves ability to cause pain. You may feel sore at the injection site for the next 2-4 days. You may apply ice to the site for 20 minutes on and 20 minutes off to decrease pain and discomfort, if needed, for the first 24 hours. After 24 hours, you may use heat if needed. Do not expect your pain to subside right away. Limit your activities for the first few days to those that you can do without pain. It may take up to 8 weeks before you have pain relief. Take your pain medicines as prescribed. You may have some weakness for the next 3-5 hours. Plan to take it easy. No baths or soaking of the sites for one week. Do not shower for 48 hours. You may resume taking your routine medicines after the procedure including pain medicines as prescribed. Resume any medications held for the procedure (blood thinners, aspirin, anti-inflammatories). If you do not already have a follow-up appointment, call your referring doctors office to make one to discuss your results within 2-4 weeks after the treatment. Your doctor will have the report within 7-10 days. You will be given a pain log to complete for the next 14 days. Complete this form and make a copy for your own records. Then, mail it back to us or drop it off at the pain management clinic. We will need this information to decide the next step in your treatment plan. Signs of infection  Fever greater than 100.4°F by mouth for 2 readings taken 4 hours apart  Increased redness, swelling around the site  Any drainage from the site    If you have any new symptoms or any signs of infection, please call [phone] during business hours to notify us. You can also notify your primary care physician. After hours, nights and weekends, call [phone].      Patient states has appointment with orthopedic MD already

## 2022-08-22 NOTE — INTERVAL H&P NOTE
I have interviewed and examined the patient and reviewed the recent History and Physical.  There have been no changes to the recent H&P documentation. The surgical consent form has been signed. Last anticoagulant medication use was:-    Premedication taken for contrast allergy? No    Valium taken for oral sedation? No    No outpatient medications have been marked as taking for the 8/22/22 encounter [facility] Encounter). The patient understands the planned operation and its associated risks and benefits and agrees to proceed.         Electronically signed by King Nguyen MD on 8/22/2022 at 9:27 AM

## 2022-08-22 NOTE — H&P
Subjective:       Patient is here today for a diagnostic/therapeutic injection. 8/22/22    Active Hospital Problems    Diagnosis Date Noted    Lumbar spondylosis [M47.816] 05/27/2022     Priority: Medium    Lumbar facet joint syndrome [M47.816] 04/25/2022     Priority: Medium       HPI: Patient is here today for adiagnostic/therapeutic injection. The most recent [facility] Pain Management office visit notes have been reviewed and are unchanged. Review of Systems   Constitutional:  Positive for fatigue. HENT: Negative. Eyes: Negative. Respiratory: Negative. Cardiovascular: Negative. Gastrointestinal:  Negative for constipation and diarrhea. Endocrine: Negative. Genitourinary:  Negative for difficulty urinating and flank pain. Musculoskeletal:  Positive for back pain and myalgias. Skin: Negative. Allergic/Immunologic: Negative. Neurological:  Positive for weakness and numbness. Hematological: Negative. Psychiatric/Behavioral:  Positive for sleep disturbance. No Known Allergies       Prior to Admission medications    Medication Sig Start Date End Date Taking? Authorizing Provider   amitriptyline (ELAVIL) 25 MG tablet Indications: per Dr Ginna Johns  1/6/21   Historical Provider, MD   Cholecalciferol (VITAMIN D) 50 MCG (2000 UT) CAPS capsule Take by mouth daily    Historical Provider, MD   Multiple Vitamins-Minerals (THERAPEUTIC MULTIVITAMIN-MINERALS) tablet Take 1 tablet by mouth daily    Historical Provider, MD   meloxicam (MOBIC) 15 MG tablet take 1 tablet by mouth once daily 2/6/20   CHARLY Fountain       Past Medical History:   Diagnosis Date    Anxiety     Chronic low back pain     Disabling. Dyshidrotic eczema     Of a couple of toes. Dysmenorrhea     Endometritis     Ingrown toenail     Painful, infected, left hallux, mycotic.     Menorrhagia     Peptic ulcer disease     Right shoulder pain     With consideration to impingement and rotator cuff syndrome. Tobacco abuse     Toenail fungus     Developing, right hallux. Past Surgical History:   Procedure Laterality Date    APPENDECTOMY      CHOLECYSTECTOMY  01/17/1997    DILATION AND CURETTAGE OF UTERUS      ERCP  01/14/1997    With endoscopic retrograde sphincterotomy and common bile duct stone extraction. [facility]. INJECTION PROCEDURE FOR SACROILIAC JOINT Bilateral 1/24/2020    Bilateral SACROILIAC & Piriformis INJ's performed by Bev Madden MD at [address] Bilateral 2/7/2020    Bilateral SACROILIAC & Piriformis INJ's performed by Bev Madden MD at [address] [facility] Bilateral 5/27/2022    Bilateral L5-S1 Diagnostic Medial Branch Block performed by Bev Madden MD at [address] [place] [address] Bilateral 7/8/2022    Bilateral L5-S1 Confirmatory Medal Branch Block performed by Bev Madden MD at [address] Bilateral 5/13/2022    Bilateral L5 TRANSFORAMINAL performed by Bev Madden MD at [address] [place] [address] 0.6-1CM TRUNK,ARM,LEG Right 2/15/2018    Excision Lesion Sebaceous Cyst Right Shoulder performed by Kingston Babinski, MD at [facility]  03/30/1999       Family andSocial History reviewed in the electronic medical record. Imaging: Reviewed available imaging in oursystem with the patient. No results found. Objective:     Vitals:    08/22/22 0830   BP: (!) 113/59   Pulse: 67   Resp: 16   Temp: 96.9 °F (36.1 °C)   SpO2: 100%          Physical Exam  Vitals and nursing note reviewed. Constitutional:       General: She is not in acute distress. Appearance: Normal appearance. She is well-developed. She is not diaphoretic. HENT:      Head: Normocephalic and atraumatic. Right Ear: External ear normal. No decreased hearing noted. Left Ear: External ear normal. No decreased hearing noted.       Nose: Nose normal.   Eyes:      General: Lids are normal. No scleral icterus. Conjunctiva/sclera: Conjunctivae normal.   Neck:      Trachea: Phonation normal.   Cardiovascular:      Comments: No BLE edema present  Pulmonary:      Effort: Pulmonary effort is normal. No accessory muscle usage or respiratory distress. Abdominal:      General: Abdomen is flat. Palpations: Abdomen is soft. Genitourinary:     Comments: deferred  Musculoskeletal:      Lumbar back: Negative right straight leg raise test and negative left straight leg raise test.   Skin:     General: Skin is warm and dry. Coloration: Skin is not pale. Findings: No erythema or rash. Neurological:      Mental Status: She is alert and oriented to person, place, and time. Psychiatric:         Speech: Speech normal.         Behavior: Behavior normal.     Neurologic Exam     Mental Status   Oriented to person, place, and time. Speech: speech is normal     Motor Exam     Strength   Right quadriceps: 5/5  Left quadriceps: 5/5  Right hamstrin/5  Left hamstrin/5  Back Exam     Tenderness   The patient is experiencing tenderness in the lumbar (+kemps B lumbar).     Range of Motion   Extension:  normal   Flexion:  normal   Lateral bend right:  normal   Lateral bend left:  normal   Rotation right:  normal   Rotation left:  normal     Muscle Strength   Right Quadriceps:  5/5   Left Quadriceps:  5/5   Right Hamstrings:  5/5   Left Hamstrings:  5/5     Tests   Straight leg raise right: negative  Straight leg raise left: negative    Other   Toe walk: normal  Heel walk: normal  Sensation: normal  Gait: normal           Lab Results   Component Value Date    WBC 7.49 2020    HGB 12.2 2020    HCT 36.7 2020     2020    CHOL 221 (H) 2021    TRIG 199 (H) 2021    HDL 47 2021    ALT 10 2021    AST 15 2021     2021    K 4.2 2021     2021    CREATININE 0.63 2021    BUN 16 2021    CO2 30 2021    TSH 2.46 11/10/2020    INR 0.9 (L) 08/06/2013       Assessment:                            Active Hospital Problems    Diagnosis Date Noted    Lumbar spondylosis [M47.816] 05/27/2022     Priority: Medium    Lumbar facet joint syndrome [M47.816] 04/25/2022     Priority: Medium                  Plan:   Proceed with planned procedure  - Right L5 RFA    The patientunderstands the planned operation and its associated risks and benefits and agrees to proceed. The surgical consent form has been signed. Last NSAID/anticoagulant medication use was:na    Premedication taken for contrast allergy? na    Valium taken for oral sedation?  na

## 2022-08-22 NOTE — ANESTHESIA PRE PROCEDURE
Department of Anesthesiology  Preprocedure Note       Name:  Jack Ramsey   Age:  50 y.o.  :  1970                                          MRN:  [id number]         Date:  2022      Surgeon: Key Soto):  Ana María Jameson MD    Procedure: Procedure(s):  Right L5-S1 Radiofrequency Ablation    Medications prior to admission:   Prior to Admission medications    Medication Sig Start Date End Date Taking? Authorizing Provider   amitriptyline (ELAVIL) 25 MG tablet Indications: per Dr Lexie Decker  21   Historical Provider, MD   Cholecalciferol (VITAMIN D) 50 MCG (2000) CAPS capsule Take by mouth daily    Historical Provider, MD   Multiple Vitamins-Minerals (THERAPEUTIC MULTIVITAMIN-MINERALS) tablet Take 1 tablet by mouth daily    Historical Provider, MD   meloxicam (MOBIC) 15 MG tablet take 1 tablet by mouth once daily 20   CHARLY Wiseman       Current medications:    No current facility-administered medications for this visit. No current outpatient medications on file.      Facility-Administered Medications Ordered in Other Visits   Medication Dose Route Frequency Provider Last Rate Last Admin    lactated ringers infusion   IntraVENous Continuous Ana María Jameson  mL/hr at 22 0842 New Bag at 22 0842       Allergies:    No Known Allergies    Problem List:    Patient Active Problem List   Diagnosis Code    Tobacco abuse Z72.0    Anxiety F41.9    Menorrhagia N92.0    Dysmenorrhea N94.6    Peptic ulcer disease K27.9    Dyshidrotic eczema L30.1    Right shoulder pain M25.511    Chronic low back pain M54.50, G89.29    Sacroiliitis (HCC) M46.1    Pain of both sacroiliac joints M53.3    Piriformis syndrome, right G57.01    Piriformis syndrome, left G57.02    Generalized anxiety disorder F41.1    Vitamin D deficiency E55.9    Fibromyalgia M79.7    Vitamin B12 deficiency E53.8    Lumbar radiculopathy M54.16    Bulging of lumbar intervertebral disc M51.26    Lumbar facet joint syndrome M47.816    Lumbar spondylosis M47.816       Past Medical History:        Diagnosis Date    Anxiety     Chronic low back pain     Disabling.  Dyshidrotic eczema     Of a couple of toes.  Dysmenorrhea     Endometritis     Ingrown toenail     Painful, infected, left hallux, mycotic.  Menorrhagia     Peptic ulcer disease     Right shoulder pain     With consideration to impingement and rotator cuff syndrome.  Tobacco abuse     Toenail fungus     Developing, right hallux. Past Surgical History:        Procedure Laterality Date    APPENDECTOMY      CHOLECYSTECTOMY  01/17/1997    DILATION AND CURETTAGE OF UTERUS      ERCP  01/14/1997    With endoscopic retrograde sphincterotomy and common bile duct stone extraction.     [facility]. INJECTION PROCEDURE FOR SACROILIAC JOINT Bilateral 1/24/2020    Bilateral SACROILIAC & Piriformis INJ's performed by Juan Francisco Petty MD at [address] [place] [address] Bilateral 2/7/2020    Bilateral SACROILIAC & Piriformis INJ's performed by Juan Francisco Petty MD at [address] [place] [address] Bilateral 5/27/2022    Bilateral L5-S1 Diagnostic Medial Branch Block performed by Juan Francisco Petty MD at [address] [place] [address] Bilateral 7/8/2022    Bilateral L5-S1 Confirmatory Medal Branch Block performed by Juan Francisco Petty MD at [address] Bilateral 5/13/2022    Bilateral L5 TRANSFORAMINAL performed by Juan Francisco Petty MD at [address] [facility] 0.6-1CM TRUNK,ARM,LEG Right 2/15/2018    Excision Lesion Sebaceous Cyst Right Shoulder performed by Martine Esteves MD at [facility]  03/30/1999       Social History:    Social History     Tobacco Use    Smoking status: Every Day     Packs/day: 0.50     Years: 20.00     Pack years: 10.00     Types: Cigarettes    Smokeless tobacco: Never   Substance Use Topics    Alcohol use: No     Alcohol/week: 0.0 standard drinks Ready to quit: Not Answered  Counseling given: Not Answered      Vital Signs (Current): There were no vitals filed for this visit. BP Readings from Last 3 Encounters:   08/22/22 (!) 113/59   07/08/22 (!) 118/56   05/27/22 (!) 117/58       NPO Status:                                                                                 BMI:   Wt Readings from Last 3 Encounters:   08/22/22 210 lb (95.3 kg)   07/08/22 214 lb 9.6 oz (97.3 kg)   05/27/22 219 lb (99.3 kg)     There is no height or weight on file to calculate BMI.    CBC:   Lab Results   Component Value Date/Time    WBC 7.49 09/09/2020 12:00 AM    RBC 9.86 09/09/2020 12:00 AM    HGB 12.2 09/09/2020 12:00 AM    HCT 36.7 09/09/2020 12:00 AM    MCV 95.3 09/09/2020 12:00 AM    RDW 14.0 10/14/2019 02:43 PM     09/09/2020 12:00 AM       CMP:   Lab Results   Component Value Date/Time     05/05/2021 10:34 AM    K 4.2 05/05/2021 10:34 AM     05/05/2021 10:34 AM    CO2 30 05/05/2021 10:34 AM    BUN 16 05/05/2021 10:34 AM    CREATININE 0.63 05/05/2021 10:34 AM    GFRAA >60 05/05/2021 10:34 AM    LABGLOM >60 05/05/2021 10:34 AM    GLUCOSE 81 05/05/2021 10:34 AM    PROT 7.1 05/05/2021 10:34 AM    CALCIUM 9.0 05/05/2021 10:34 AM    BILITOT 0.80 05/05/2021 10:34 AM    ALKPHOS 77 05/05/2021 10:34 AM    AST 15 05/05/2021 10:34 AM    ALT 10 05/05/2021 10:34 AM       POC Tests: No results for input(s): POCGLU, POCNA, POCK, POCCL, POCBUN, POCHEMO, POCHCT in the last 72 hours.     Coags:   Lab Results   Component Value Date/Time    PROTIME 10.0 08/06/2013 01:02 PM    INR 0.9 08/06/2013 01:02 PM       HCG (If Applicable):   Lab Results   Component Value Date    PREGTESTUR NEGATIVE 02/15/2018        ABGs: No results found for: PHART, PO2ART, OJE5STG, OOS0BBI, BEART, K9YUPAJO     Type & Screen (If Applicable):  No results found for: LABABO, LABRH    Drug/Infectious Status (If Applicable):  Lab Results   Component Value Date/Time    HEPCAB NONREACTIVE 05/05/2021 10:34 AM       COVID-19 Screening (If Applicable):   Lab Results   Component Value Date/Time    COVID19 Not Detected 10/09/2020 02:50 PM           Anesthesia Evaluation  Patient summary reviewed and Nursing notes reviewed no history of anesthetic complications:   Airway: Mallampati: II  TM distance: >3 FB   Neck ROM: full  Mouth opening: > = 3 FB   Dental:    (+) upper dentures      Pulmonary:Negative Pulmonary ROS and normal exam                               Cardiovascular:  Exercise tolerance: no interval change,                     Neuro/Psych:   (+) neuromuscular disease:, psychiatric history: stable with treatmentdepression/anxiety             GI/Hepatic/Renal:   (+) PUD,           Endo/Other:    (+) : arthritis:., .                 Abdominal:             Vascular: Other Findings:             Anesthesia Plan      general and TIVA     ASA 2       Induction: intravenous. Anesthetic plan and risks discussed with patient. Use of blood products discussed with patient whom consented to blood products.    Plan discussed with surgical team.                    ELISSA Dee - CRNA   8/22/2022

## 2022-08-22 NOTE — H&P (VIEW-ONLY)
Subjective:       Patient is here today for a diagnostic/therapeutic injection. 8/22/22    Active Hospital Problems    Diagnosis Date Noted    Lumbar spondylosis [M47.816] 05/27/2022     Priority: Medium    Lumbar facet joint syndrome [M47.816] 04/25/2022     Priority: Medium       HPI: Patient is here today for adiagnostic/therapeutic injection. The most recent [facility] office visit notes have been reviewed and are unchanged. Review of Systems   Constitutional:  Positive for fatigue. HENT: Negative. Eyes: Negative. Respiratory: Negative. Cardiovascular: Negative. Gastrointestinal:  Negative for constipation and diarrhea. Endocrine: Negative. Genitourinary:  Negative for difficulty urinating and flank pain. Musculoskeletal:  Positive for back pain and myalgias. Skin: Negative. Allergic/Immunologic: Negative. Neurological:  Positive for weakness and numbness. Hematological: Negative. Psychiatric/Behavioral:  Positive for sleep disturbance. No Known Allergies       Prior to Admission medications    Medication Sig Start Date End Date Taking? Authorizing Provider   amitriptyline (ELAVIL) 25 MG tablet Indications: per Dr Jason Odom  1/6/21   Historical Provider, MD   Cholecalciferol (VITAMIN D) 50 MCG (2000 UT) CAPS capsule Take by mouth daily    Historical Provider, MD   Multiple Vitamins-Minerals (THERAPEUTIC MULTIVITAMIN-MINERALS) tablet Take 1 tablet by mouth daily    Historical Provider, MD   meloxicam (MOBIC) 15 MG tablet take 1 tablet by mouth once daily 2/6/20   CHARLY Dao       Past Medical History:   Diagnosis Date    Anxiety     Chronic low back pain     Disabling. Dyshidrotic eczema     Of a couple of toes. Dysmenorrhea     Endometritis     Ingrown toenail     Painful, infected, left hallux, mycotic.     Menorrhagia     Peptic ulcer disease     Right shoulder pain     With consideration to impingement and icterus. Conjunctiva/sclera: Conjunctivae normal.   Neck:      Trachea: Phonation normal.   Cardiovascular:      Comments: No BLE edema present  Pulmonary:      Effort: Pulmonary effort is normal. No accessory muscle usage or respiratory distress. Abdominal:      General: Abdomen is flat. Palpations: Abdomen is soft. Genitourinary:     Comments: deferred  Musculoskeletal:      Lumbar back: Negative right straight leg raise test and negative left straight leg raise test.   Skin:     General: Skin is warm and dry. Coloration: Skin is not pale. Findings: No erythema or rash. Neurological:      Mental Status: She is alert and oriented to person, place, and time. Psychiatric:         Speech: Speech normal.         Behavior: Behavior normal.     Neurologic Exam     Mental Status   Oriented to person, place, and time. Speech: speech is normal     Motor Exam     Strength   Right quadriceps: 5/5  Left quadriceps: 5/5  Right hamstrin/5  Left hamstrin/5  Back Exam     Tenderness   The patient is experiencing tenderness in the lumbar (+kemps B lumbar).     Range of Motion   Extension:  normal   Flexion:  normal   Lateral bend right:  normal   Lateral bend left:  normal   Rotation right:  normal   Rotation left:  normal     Muscle Strength   Right Quadriceps:  5/5   Left Quadriceps:  5/5   Right Hamstrings:  5/5   Left Hamstrings:  5/5     Tests   Straight leg raise right: negative  Straight leg raise left: negative    Other   Toe walk: normal  Heel walk: normal  Sensation: normal  Gait: normal           Lab Results   Component Value Date    WBC 7.49 2020    HGB 12.2 2020    HCT 36.7 2020     2020    CHOL 221 (H) 2021    TRIG 199 (H) 2021    HDL 47 2021    ALT 10 2021    AST 15 2021     2021    K 4.2 2021     2021    CREATININE 0.63 2021    BUN 16 2021    CO2 30 2021    TSH 2.46 11/10/2020    INR 0.9 (L) 08/06/2013       Assessment:                            Active Hospital Problems    Diagnosis Date Noted    Lumbar spondylosis [M47.816] 05/27/2022     Priority: Medium    Lumbar facet joint syndrome [M47.816] 04/25/2022     Priority: Medium                  Plan:   Proceed with planned procedure  - Right L5 RFA    The patientunderstands the planned operation and its associated risks and benefits and agrees to proceed. The surgical consent form has been signed. Last NSAID/anticoagulant medication use was:na    Premedication taken for contrast allergy? na    Valium taken for oral sedation?  na

## 2022-08-23 NOTE — ANESTHESIA POSTPROCEDURE EVALUATION
Department of Anesthesiology  Postprocedure Note    Patient: Rosina Rivzi  MRN: [id number]  YOB: 1970  Date of evaluation: 8/23/2022      Procedure Summary     Date: 08/22/22 Room / Location: [address] [place] [address]    Anesthesia Start: 9580 Anesthesia Stop: 7441    Procedure: Right L5-S1 Radiofrequency Ablation (Right) Diagnosis:       Lumbar facet joint syndrome      Lumbar spondylolysis      (Lumbar facet joint syndrome [M47.816])      (Lumbar spondylolysis [M43.06])    Surgeons: Juan Francisco Petty MD Responsible Provider: ELISSA Mendoza CRNA    Anesthesia Type: general, TIVA ASA Status: 2          Anesthesia Type: No value filed.     Niesha Phase I: Niesha Score: 9    Niesha Phase II:        Anesthesia Post Evaluation    Patient location during evaluation: PACU  Patient participation: complete - patient participated  Level of consciousness: awake and alert  Pain score: 0  Airway patency: patent  Nausea & Vomiting: no nausea and no vomiting  Complications: no  Cardiovascular status: blood pressure returned to baseline and hemodynamically stable  Respiratory status: acceptable, spontaneous ventilation and room air  Hydration status: euvolemic

## 2022-08-26 NOTE — OP NOTE
RADIOFREQUENCY ABLATION OPERATIVE REPORT    8/26/22  The patient was counseled at length about the risks of tone Covid-19 during their perioperative period and any recovery window from their procedure. The patient was made aware that tone Covid-19  may worsen their prognosis for recovering from their procedure  and lend to a higher morbidity and/or mortality risk. All material risks, benefits, and reasonable alternatives including postponing the procedure were discussed. The patient does wish to proceed with the procedure at this time. Surgeon: Saw Lazo MD    Pre-operative Diagnosis:   Hospital Problems             Last Modified POA    * (Principal) Lumbar facet joint syndrome 8/22/2022 Yes    Lumbar spondylosis 8/22/2022 Yes       Post-operative Diagnosis: Same    INDICATION:  Right L5-S1 radiofrequency neurotomy procedure is requested for therapeutic reasons. Please see H&P for details on previous treatments, examination findings, and work up. right L5-S1 medial branch blocks are requested for diagnostic reasons. Conservative treatment was ineffective i.e.: ice, NSAIDS, rest,     Patient is unable to perform the following ADL's: personal cares, ambulating, grooming, sitting, and standing     Pain Assessment: 0-10  Pain Level: 4     Pain Orientation: Right  Pain Location: Back  Pain Descriptors: Nagging    Last Plain films: 2021    EXAMINATION:  Right L5-S1 radiofrequency neurotomies. CONSENT:  Written consent was obtained from the patient on the preprinted consent form after explaining the procedure, indications, potential complications and outcomes. Alternative treatments were also discussed. DISCUSSION:  The patient was sterilely prepped and draped in the usual fashion in the prone position. Time out was verified for the correct patient, side, level and procedure.     SEDATION: per orders CRNA   FACET RF  Under image-intensifier control, a 66906  mm RadionBioscale RFK insulated radiofrequency probe with 5 mm active tips were successfully directed at the Right l5-S1 medial branches of the dorsal rami at the target points described by LEONARDO. Needle tip positions were confirmed in 3 views and sensory and motor test stimulation was performed. The patient reported sensory stimulation at 0.0 to 0.0 volts at 50 Hz and motor stimulation at 0.0 to 0.9 volts at 2 Hz, which confirmed satisfactory sensory motor dissociation. 1 ml of 2% lidocaine was instilled  at each site prior to lesioning. 1 lesions were performed at each level  BY SAGITTAL APPROACH at a temperature of 80 degrees Celsius for a duration of 90 seconds, as described by LEONARDO. Impedance was measured and ranged from 202-256 ohms. Then, 0.5mL of  0.5% bupivacaine was instilled at each site after lesioning. The patient tolerated the procedure(s) well and without complications and was noted to be in stable condition prior to discharge from procedure center with discharge instructions. EBL: no blood loss    SPECIMEN: none    IMPRESSION:    Right l5-S1 radiofrequency neurotomies performed uneventfully. RECOMMENDATIONS:  1. Follow up in the [address] in 2 to 4 weeks  2. Continue Neurontin and opioid analgesia, as per protocol. 3.    Complete and return the \"Post-Procedure Pain and Activity Diary. 4.    Contact the [address] for symptom exacerbation, fever or unusual symptoms. 5.    Follow post-procedure care according to verbal and written instructions. POST-PROCEDURE LUMBAR SPINE FLUOROSCOPIC IMAGE INTERPRETATION:    EXAMINATION:  AP, lateral and oblique views of the lumbar spine    FLUORO TIME: 21 seconds    DISCUSSION:  Spot views of the spine reveal normal alignment and segmentation. Spinal needles are positioned at the base of the SAP and across the pedicle on AP and lateral views. #3:  across the ventral and middle third of the articular pillar.  Visualized spine reveals See radiology report. Soft tissues reveal no abnormalities. IMPRESSION: Satisfactory probe placement for RF (radiofrequency) lesioning of the Right L5-S1 medial branches.     Electronically signed by Morgan Sierra MD on 8/26/2022 at 10:44 AM

## 2022-09-06 ENCOUNTER — APPOINTMENT (OUTPATIENT)
Dept: GENERAL RADIOLOGY | Age: 52
End: 2022-09-06
Attending: PHYSICAL MEDICINE & REHABILITATION
Payer: COMMERCIAL

## 2022-09-06 ENCOUNTER — ANESTHESIA (OUTPATIENT)
Dept: OPERATING ROOM | Age: 52
End: 2022-09-06
Payer: COMMERCIAL

## 2022-09-06 ENCOUNTER — HOSPITAL ENCOUNTER (OUTPATIENT)
Age: 52
Setting detail: OUTPATIENT SURGERY
Discharge: HOME | End: 2022-09-06
Attending: PHYSICAL MEDICINE & REHABILITATION | Admitting: PHYSICAL MEDICINE & REHABILITATION
Payer: COMMERCIAL

## 2022-09-06 ENCOUNTER — ANESTHESIA EVENT (OUTPATIENT)
Dept: OPERATING ROOM | Age: 52
End: 2022-09-06
Payer: COMMERCIAL

## 2022-09-06 VITALS
SYSTOLIC BLOOD PRESSURE: 113 MMHG | BODY MASS INDEX: 31.55 KG/M2 | TEMPERATURE: 97.2 F | HEIGHT: 69 IN | RESPIRATION RATE: 16 BRPM | OXYGEN SATURATION: 95 % | WEIGHT: 213 LBS | DIASTOLIC BLOOD PRESSURE: 56 MMHG | HEART RATE: 58 BPM

## 2022-09-06 PROCEDURE — 7100000011 HC PHASE II RECOVERY - ADDTL 15 MIN: Performed by: PHYSICAL MEDICINE & REHABILITATION

## 2022-09-06 PROCEDURE — 3600000056 HC PAIN LEVEL 4 BASE: Performed by: PHYSICAL MEDICINE & REHABILITATION

## 2022-09-06 PROCEDURE — 7100000010 HC PHASE II RECOVERY - FIRST 15 MIN: Performed by: PHYSICAL MEDICINE & REHABILITATION

## 2022-09-06 PROCEDURE — 3700000000 HC ANESTHESIA ATTENDED CARE: Performed by: PHYSICAL MEDICINE & REHABILITATION

## 2022-09-06 PROCEDURE — 3209999900 FLUORO FOR SURGICAL PROCEDURES

## 2022-09-06 PROCEDURE — 2709999900 HC NON-CHARGEABLE SUPPLY: Performed by: PHYSICAL MEDICINE & REHABILITATION

## 2022-09-06 PROCEDURE — 2500000003 HC RX 250 WO HCPCS: Performed by: NURSE ANESTHETIST, CERTIFIED REGISTERED

## 2022-09-06 PROCEDURE — 6360000002 HC RX W HCPCS: Performed by: NURSE ANESTHETIST, CERTIFIED REGISTERED

## 2022-09-06 PROCEDURE — 2580000003 HC RX 258: Performed by: NURSE ANESTHETIST, CERTIFIED REGISTERED

## 2022-09-06 PROCEDURE — 2500000003 HC RX 250 WO HCPCS: Performed by: PHYSICAL MEDICINE & REHABILITATION

## 2022-09-06 RX ORDER — LIDOCAINE HYDROCHLORIDE 20 MG/ML
INJECTION, SOLUTION INFILTRATION; PERINEURAL PRN
Status: DISCONTINUED | OUTPATIENT
Start: 2022-09-06 | End: 2022-09-06 | Stop reason: ALTCHOICE

## 2022-09-06 RX ORDER — SODIUM CHLORIDE, SODIUM LACTATE, POTASSIUM CHLORIDE, CALCIUM CHLORIDE 600; 310; 30; 20 MG/100ML; MG/100ML; MG/100ML; MG/100ML
INJECTION, SOLUTION INTRAVENOUS CONTINUOUS
Status: DISCONTINUED | OUTPATIENT
Start: 2022-09-06 | End: 2022-09-06 | Stop reason: HOSPADM

## 2022-09-06 RX ORDER — LIDOCAINE HYDROCHLORIDE 20 MG/ML
INJECTION, SOLUTION EPIDURAL; INFILTRATION; INTRACAUDAL; PERINEURAL PRN
Status: DISCONTINUED | OUTPATIENT
Start: 2022-09-06 | End: 2022-09-06 | Stop reason: SDUPTHER

## 2022-09-06 RX ORDER — PROPOFOL 10 MG/ML
INJECTION, EMULSION INTRAVENOUS PRN
Status: DISCONTINUED | OUTPATIENT
Start: 2022-09-06 | End: 2022-09-06 | Stop reason: SDUPTHER

## 2022-09-06 RX ORDER — SODIUM CHLORIDE 0.9 % (FLUSH) 0.9 %
5-40 SYRINGE (ML) INJECTION PRN
Status: DISCONTINUED | OUTPATIENT
Start: 2022-09-06 | End: 2022-09-06 | Stop reason: HOSPADM

## 2022-09-06 RX ORDER — SODIUM CHLORIDE 9 MG/ML
INJECTION, SOLUTION INTRAVENOUS PRN
Status: DISCONTINUED | OUTPATIENT
Start: 2022-09-06 | End: 2022-09-06 | Stop reason: HOSPADM

## 2022-09-06 RX ORDER — SODIUM CHLORIDE 0.9 % (FLUSH) 0.9 %
5-40 SYRINGE (ML) INJECTION EVERY 12 HOURS SCHEDULED
Status: DISCONTINUED | OUTPATIENT
Start: 2022-09-06 | End: 2022-09-06 | Stop reason: HOSPADM

## 2022-09-06 RX ORDER — SODIUM CHLORIDE, SODIUM LACTATE, POTASSIUM CHLORIDE, CALCIUM CHLORIDE 600; 310; 30; 20 MG/100ML; MG/100ML; MG/100ML; MG/100ML
INJECTION, SOLUTION INTRAVENOUS CONTINUOUS PRN
Status: DISCONTINUED | OUTPATIENT
Start: 2022-09-06 | End: 2022-09-06 | Stop reason: SDUPTHER

## 2022-09-06 RX ADMIN — PROPOFOL 220 MG: 10 INJECTION, EMULSION INTRAVENOUS at 13:52

## 2022-09-06 RX ADMIN — LIDOCAINE HYDROCHLORIDE 100 MG: 20 INJECTION, SOLUTION EPIDURAL; INFILTRATION; INTRACAUDAL; PERINEURAL at 13:50

## 2022-09-06 RX ADMIN — SODIUM CHLORIDE, POTASSIUM CHLORIDE, SODIUM LACTATE AND CALCIUM CHLORIDE: 600; 310; 30; 20 INJECTION, SOLUTION INTRAVENOUS at 13:48

## 2022-09-06 ASSESSMENT — PAIN DESCRIPTION - ORIENTATION
ORIENTATION: RIGHT
ORIENTATION: RIGHT

## 2022-09-06 ASSESSMENT — PAIN DESCRIPTION - LOCATION
LOCATION: BACK
LOCATION: BACK

## 2022-09-06 ASSESSMENT — PAIN DESCRIPTION - PAIN TYPE
TYPE: CHRONIC PAIN
TYPE: CHRONIC PAIN

## 2022-09-06 ASSESSMENT — PAIN SCALES - GENERAL
PAINLEVEL_OUTOF10: 3
PAINLEVEL_OUTOF10: 3

## 2022-09-06 ASSESSMENT — PAIN - FUNCTIONAL ASSESSMENT: PAIN_FUNCTIONAL_ASSESSMENT: 0-10

## 2022-09-06 NOTE — INTERVAL H&P NOTE
I have interviewed and examined the patient and reviewed the recent History and Physical.  There have been no changes to the recent H&P documentation. The surgical consent form has been signed. Last anticoagulant medication use was:-    Premedication taken for contrast allergy? No    Valium taken for oral sedation? No    No outpatient medications have been marked as taking for the 9/6/22 encounter [facility] Encounter). The patient understands the planned operation and its associated risks and benefits and agrees to proceed.         Electronically signed by Eileen Kramer MD on 9/6/2022 at 1:47 PM

## 2022-09-06 NOTE — DISCHARGE INSTRUCTIONS
Home Care after Radiofrequency Ablation    The doctor has done a radiofrequency procedure to disrupt the nerves ability to cause pain. You may feel sore at the injection site for the next 2-4 days. You may apply ice to the site for 20 minutes on and 20 minutes off to decrease pain and discomfort, if needed, for the first 24 hours. After 24 hours, you may use heat if needed. Do not expect your pain to subside right away. Limit your activities for the first few days to those that you can do without pain. It may take up to 8 weeks before you have pain relief. Take your pain medicines as prescribed. You may have some weakness for the next 3-5 hours. Plan to take it easy. No baths or soaking of the sites for one week. Do not shower for 48 hours. You may resume taking your routine medicines after the procedure including pain medicines as prescribed. Resume any medications held for the procedure (blood thinners, aspirin, anti-inflammatories). If you do not already have a follow-up appointment, call your referring doctors office to make one to discuss your results within 2-4 weeks after the treatment. Your doctor will have the report within 7-10 days. You will be given a pain log to complete for the next 14 days. Complete this form and make a copy for your own records. Then, mail it back to us or drop it off at the pain management clinic. We will need this information to decide the next step in your treatment plan. Signs of infection  Fever greater than 100.4°F by mouth for 2 readings taken 4 hours apart  Increased redness, swelling around the site  Any drainage from the site    If you have any new symptoms or any signs of infection, please call [phone] during business hours to notify us. You can also notify your primary care physician. After hours, nights and weekends, call [phone].          **Patient has follow up scheduled with Dr. Ezequiel Contreras Friday September 9 per patient**

## 2022-09-06 NOTE — ANESTHESIA POSTPROCEDURE EVALUATION
Department of Anesthesiology  Postprocedure Note    Patient: Elsa Marin  MRN: [id number]  YOB: 1970  Date of evaluation: 9/6/2022      Procedure Summary     Date: 09/06/22 Room / Location: [address] [place] [address]    Anesthesia Start: 1542 Anesthesia Stop: 6736    Procedure: Left L5-S1 Radiofrequency Ablation (Left) Diagnosis:       Lumbar facet joint syndrome      Lumbar spondylosis      (Lumbar facet joint syndrome [M47.816])      (Lumbar spondylosis [Z95.925])    Surgeons: Ivette Gomez MD Responsible Provider: ELISSA Gar CRNA    Anesthesia Type: general, TIVA ASA Status: 2          Anesthesia Type: No value filed.     Niesha Phase I: Niesha Score: 10    Niesha Phase II:        Anesthesia Post Evaluation    Patient location during evaluation: PACU  Patient participation: complete - patient participated  Level of consciousness: awake and alert  Pain score: 0  Airway patency: patent  Nausea & Vomiting: no nausea and no vomiting  Complications: no  Cardiovascular status: blood pressure returned to baseline and hemodynamically stable  Respiratory status: acceptable  Hydration status: euvolemic

## 2022-09-06 NOTE — ANESTHESIA PRE PROCEDURE
Department of Anesthesiology  Preprocedure Note       Name:  Rosey Avelar   Age:  50 y.o.  :  1970                                          MRN:  [id number]         Date:  2022      Surgeon: Carmel Harrison):  Saw Lzao MD    Procedure: Procedure(s):  Left L5-S1 Radiofrequency Ablation    Medications prior to admission:   Prior to Admission medications    Medication Sig Start Date End Date Taking? Authorizing Provider   amitriptyline (ELAVIL) 25 MG tablet Indications: per Dr Jack Ibrahim  21   Historical Provider, MD   Cholecalciferol (VITAMIN D) 50 MCG (2000) CAPS capsule Take by mouth daily    Historical Provider, MD   Multiple Vitamins-Minerals (THERAPEUTIC MULTIVITAMIN-MINERALS) tablet Take 1 tablet by mouth daily    Historical Provider, MD   meloxicam (MOBIC) 15 MG tablet take 1 tablet by mouth once daily 20   CHARLY Acharya       Current medications:    No current outpatient medications on file. No current facility-administered medications for this visit. Allergies:    No Known Allergies    Problem List:    Patient Active Problem List   Diagnosis Code    Tobacco abuse Z72.0    Anxiety F41.9    Menorrhagia N92.0    Dysmenorrhea N94.6    Peptic ulcer disease K27.9    Dyshidrotic eczema L30.1    Right shoulder pain M25.511    Chronic low back pain M54.50, G89.29    Sacroiliitis (HCC) M46.1    Pain of both sacroiliac joints M53.3    Piriformis syndrome, right G57.01    Piriformis syndrome, left G57.02    Generalized anxiety disorder F41.1    Vitamin D deficiency E55.9    Fibromyalgia M79.7    Vitamin B12 deficiency E53.8    Lumbar radiculopathy M54.16    Bulging of lumbar intervertebral disc M51.26    Lumbar facet joint syndrome M47.816    Lumbar spondylosis M47.816       Past Medical History:        Diagnosis Date    Anxiety     Chronic low back pain     Disabling.  Dyshidrotic eczema     Of a couple of toes.     Dysmenorrhea     Endometritis  Ingrown toenail     Painful, infected, left hallux, mycotic.  Menorrhagia     Peptic ulcer disease     Right shoulder pain     With consideration to impingement and rotator cuff syndrome.  Tobacco abuse     Toenail fungus     Developing, right hallux. Past Surgical History:        Procedure Laterality Date    APPENDECTOMY      CHOLECYSTECTOMY  01/17/1997    DILATION AND CURETTAGE OF UTERUS      ERCP  01/14/1997    With endoscopic retrograde sphincterotomy and common bile duct stone extraction.  [facility] INJECTION PROCEDURE FOR SACROILIAC JOINT Bilateral 1/24/2020    Bilateral SACROILIAC & Piriformis INJ's performed by Tiffany Gomez MD at [address] [facility] [address] [place] [address] Bilateral 2/7/2020    Bilateral SACROILIAC & Piriformis INJ's performed by Tiffany Gomez MD at [address] [facility] Bilateral 5/27/2022    Bilateral L5-S1 Diagnostic Medial Branch Block performed by Tiffany Gomez MD at [facility] Bilateral 7/8/2022    Bilateral L5-S1 Confirmatory Medal Branch Block performed by Tiffany Gomez MD at [address] Bilateral 5/13/2022    Bilateral L5 TRANSFORAMINAL performed by Tiffany Gomez MD at [address] Right 8/22/2022    Right L5-S1 Radiofrequency Ablation performed by Tiffany Gomez MD at [address] [facility] 0.6-1CM TRUNK,ARM,LEG Right 2/15/2018    Excision Lesion Sebaceous Cyst Right Shoulder performed by Wai Perez MD at [address]  03/30/1999       Social History:    Social History     Tobacco Use    Smoking status: Every Day     Packs/day: 0.50     Years: 20.00     Pack years: 10.00     Types: Cigarettes    Smokeless tobacco: Never   Substance Use Topics    Alcohol use: No     Alcohol/week: 0.0 standard drinks                                Ready to quit: Not Answered  Counseling given: Not Answered      Vital Signs (Current):    There were no vitals filed for this visit. BP Readings from Last 3 Encounters:   09/06/22 (!) 121/59   08/22/22 (!) 121/58   07/08/22 (!) 118/56       NPO Status:                                                                                 BMI:   Wt Readings from Last 3 Encounters:   09/06/22 213 lb (96.6 kg)   08/22/22 210 lb (95.3 kg)   07/08/22 214 lb 9.6 oz (97.3 kg)     There is no height or weight on file to calculate BMI.    CBC:   Lab Results   Component Value Date/Time    WBC 7.49 09/09/2020 12:00 AM    RBC 9.86 09/09/2020 12:00 AM    HGB 12.2 09/09/2020 12:00 AM    HCT 36.7 09/09/2020 12:00 AM    MCV 95.3 09/09/2020 12:00 AM    RDW 14.0 10/14/2019 02:43 PM     09/09/2020 12:00 AM       CMP:   Lab Results   Component Value Date/Time     05/05/2021 10:34 AM    K 4.2 05/05/2021 10:34 AM     05/05/2021 10:34 AM    CO2 30 05/05/2021 10:34 AM    BUN 16 05/05/2021 10:34 AM    CREATININE 0.63 05/05/2021 10:34 AM    GFRAA >60 05/05/2021 10:34 AM    LABGLOM >60 05/05/2021 10:34 AM    GLUCOSE 81 05/05/2021 10:34 AM    PROT 7.1 05/05/2021 10:34 AM    CALCIUM 9.0 05/05/2021 10:34 AM    BILITOT 0.80 05/05/2021 10:34 AM    ALKPHOS 77 05/05/2021 10:34 AM    AST 15 05/05/2021 10:34 AM    ALT 10 05/05/2021 10:34 AM       POC Tests: No results for input(s): POCGLU, POCNA, POCK, POCCL, POCBUN, POCHEMO, POCHCT in the last 72 hours.     Coags:   Lab Results   Component Value Date/Time    PROTIME 10.0 08/06/2013 01:02 PM    INR 0.9 08/06/2013 01:02 PM       HCG (If Applicable):   Lab Results   Component Value Date    PREGTESTUR NEGATIVE 02/15/2018        ABGs: No results found for: PHART, PO2ART, YPI2ZWB, ACD6RQN, BEART, H3YGPQTT     Type & Screen (If Applicable):  No results found for: LABABO, LABRH    Drug/Infectious Status (If Applicable):  Lab Results   Component Value Date/Time    HEPCAB NONREACTIVE 05/05/2021 10:34 AM       COVID-19 Screening (If Applicable):   Lab Results   Component Value Date/Time    COVID19 Not Detected 10/09/2020 02:50 PM           Anesthesia Evaluation  Patient summary reviewed and Nursing notes reviewed no history of anesthetic complications:   Airway: Mallampati: II  TM distance: >3 FB   Neck ROM: full  Mouth opening: > = 3 FB   Dental:    (+) upper dentures      Pulmonary:Negative Pulmonary ROS and normal exam                               Cardiovascular:  Exercise tolerance: no interval change,                     Neuro/Psych:   (+) neuromuscular disease:, psychiatric history: stable with treatmentdepression/anxiety             GI/Hepatic/Renal:   (+) PUD,           Endo/Other:    (+) : arthritis:., .                 Abdominal:             Vascular: Other Findings:             Anesthesia Plan      general and TIVA     ASA 2       Induction: intravenous. Anesthetic plan and risks discussed with patient. Use of blood products discussed with patient whom consented to blood products.    Plan discussed with surgical team.                    ELISSA Denson - CRNA   9/6/2022

## 2022-09-06 NOTE — OP NOTE
RADIOFREQUENCY ABLATION OPERATIVE REPORT    9/6/22  The patient was counseled at length about the risks of tone Covid-19 during their perioperative period and any recovery window from their procedure. The patient was made aware that tone Covid-19  may worsen their prognosis for recovering from their procedure  and lend to a higher morbidity and/or mortality risk. All material risks, benefits, and reasonable alternatives including postponing the procedure were discussed. The patient does wish to proceed with the procedure at this time. Surgeon: Wai Medina MD    Pre-operative Diagnosis:   Hospital Problems             Last Modified POA    * (Principal) Lumbar facet joint syndrome 9/6/2022 Yes    Lumbar spondylosis 9/6/2022 Yes       Post-operative Diagnosis: Same    INDICATION:  Left L5, S1 radiofrequency neurotomy procedure is requested for therapeutic reasons. Please see H&P for details on previous treatments, examination findings, and work up. left L5, S1 medial branch blocks are requested for diagnostic reasons. Conservative treatment was ineffective i.e.: ice, NSAIDS, rest,     Patient is unable to perform the following ADL's: personal cares, ambulating, sitting, and standing     Pain Assessment: 0-10  Pain Level: 3     Pain Orientation: Lower  Pain Location: Back       Last Plain films: 2021    EXAMINATION:  Left L5, S1 radiofrequency neurotomies. CONSENT:  Written consent was obtained from the patient on the preprinted consent form after explaining the procedure, indications, potential complications and outcomes. Alternative treatments were also discussed. DISCUSSION:  The patient was sterilely prepped and draped in the usual fashion in the prone position. Time out was verified for the correct patient, side, level and procedure.     SEDATION: .ender AMES   FACET RF  Under image-intensifier control, a 28319  mm [place] RFK insulated radiofrequency probe with 5 mm active tips were successfully directed at the Left L5, S1 medial branches of the dorsal rami at the target points described by LEONARDO. Needle tip positions were confirmed in 3 views and sensory and motor test stimulation was performed. The patient reported sensory stimulation at 0.0 to 0.0 volts at 50 Hz and motor stimulation at 0.0 to 0.9 volts at 2 Hz, which confirmed satisfactory sensory motor dissociation. 1 ml of 2% lidocaine was instilled  at each site prior to lesioning. 1 lesions were performed at each level  BY SAGITTAL APPROACH at a temperature of 80 degrees Celsius for a duration of 90 seconds, as described by LEONARDO. Impedance was measured and ranged from 202-322 ohms. Then, 0.5mL of  0.5% bupivacaine was instilled at each site after lesioning. The patient tolerated the procedure(s) well and without complications and was noted to be in stable condition prior to discharge from procedure center with discharge instructions. EBL: no blood loss    SPECIMEN: none    IMPRESSION:    Left L5, S1 radiofrequency neurotomies performed uneventfully. RECOMMENDATIONS:  1. Follow up in the [address] in 2 to 4 weeks  2. Continue Neurontin and opioid analgesia, as per protocol. 3.    Complete and return the \"Post-Procedure Pain and Activity Diary. 4.    Contact the [address] for symptom exacerbation, fever or unusual symptoms. 5.    Follow post-procedure care according to verbal and written instructions. POST-PROCEDURE LUMBAR SPINE FLUOROSCOPIC IMAGE INTERPRETATION:    EXAMINATION:  AP, lateral and oblique views of the lumbar spine    FLUORO TIME: 23 seconds    DISCUSSION:  Spot views of the spine reveal normal alignment and segmentation. Spinal needles are positioned at the base of the SAP and across the pedicle on AP and lateral views. #3:  across the ventral and middle third of the articular pillar. Visualized spine reveals See radiology report.  Soft tissues reveal no abnormalities. IMPRESSION: Satisfactory probe placement for RF (radiofrequency) lesioning of the Left L5, S1 medial branches.     Electronically signed by Bev Madden MD on 9/6/2022 at 1:48 PM

## 2022-09-19 ENCOUNTER — TELEPHONE (OUTPATIENT)
Dept: PAIN MANAGEMENT | Age: 52
End: 2022-09-19

## 2022-09-19 NOTE — TELEPHONE ENCOUNTER
Letter received with RX order for Bilateral L4 IESI  and scanned into media for your review. Please advise. Thank you! Sherryle Moder

## 2022-09-29 ENCOUNTER — TELEPHONE (OUTPATIENT)
Dept: PAIN MANAGEMENT | Age: 52
End: 2022-09-29

## 2022-09-29 ENCOUNTER — OFFICE VISIT (OUTPATIENT)
Dept: PAIN MANAGEMENT | Age: 52
End: 2022-09-29
Payer: COMMERCIAL

## 2022-09-29 VITALS
RESPIRATION RATE: 17 BRPM | WEIGHT: 209.38 LBS | HEIGHT: 69 IN | DIASTOLIC BLOOD PRESSURE: 82 MMHG | HEART RATE: 76 BPM | OXYGEN SATURATION: 96 % | SYSTOLIC BLOOD PRESSURE: 118 MMHG | BODY MASS INDEX: 31.01 KG/M2

## 2022-09-29 DIAGNOSIS — M54.16 LUMBAR RADICULOPATHY: ICD-10-CM

## 2022-09-29 DIAGNOSIS — M47.816 LUMBAR FACET JOINT SYNDROME: ICD-10-CM

## 2022-09-29 DIAGNOSIS — M51.36 BULGING OF LUMBAR INTERVERTEBRAL DISC: Primary | ICD-10-CM

## 2022-09-29 DIAGNOSIS — M48.061 SPINAL STENOSIS OF LUMBAR REGION WITHOUT NEUROGENIC CLAUDICATION: ICD-10-CM

## 2022-09-29 PROCEDURE — 99214 OFFICE O/P EST MOD 30 MIN: CPT | Performed by: NURSE PRACTITIONER

## 2022-09-29 RX ORDER — ALPRAZOLAM 0.5 MG/1
TABLET ORAL
COMMUNITY
Start: 2022-07-09

## 2022-09-29 RX ORDER — TIZANIDINE 4 MG/1
TABLET ORAL
COMMUNITY
Start: 2022-07-09

## 2022-09-29 RX ORDER — OMEPRAZOLE 40 MG/1
CAPSULE, DELAYED RELEASE ORAL
COMMUNITY
Start: 2022-08-30

## 2022-09-29 RX ORDER — AMITRIPTYLINE HYDROCHLORIDE 50 MG/1
TABLET, FILM COATED ORAL
COMMUNITY
Start: 2022-08-17

## 2022-09-29 RX ORDER — CELECOXIB 200 MG/1
CAPSULE ORAL
COMMUNITY
Start: 2021-08-27

## 2022-09-29 ASSESSMENT — ENCOUNTER SYMPTOMS
CONSTIPATION: 0
SHORTNESS OF BREATH: 0
BACK PAIN: 1

## 2022-09-29 NOTE — PATIENT INSTRUCTIONS
[facility]  [address] [place], [address]  Telephone [phone]  Fax [phone]    PROCEDURE INSTRUCTIONS FOR  PAIN MANAGEMENT PROCEDURES WITH ANESTHESIA/ IV SEDATION    Jack Ramsey is scheduled to see Dr. Arley Henry to undergo the following procedure:   Bilateral L4 Transforaminal Epidural Steroid Injection     Procedure Date: ____10/21/22____  You will receive a phone call to schedule COVID 19 testing. You will receive a phone call the day prior to your procedure to confirm a time or arrival.    [facility] on the 1st floor in the hospital, after check in and signing of paper work you will then go to the second floor to the surgery center. 1. Stop the following medications prior to the procedure:   None   Stop blood thinners as directed before the injection, with permission from your cardiologist or primary care physician. We will send a letter to them requesting permission to hold the blood thinners. If you take Warfarin (Coumadin), you must have your blood drawn for an INR the day before the procedure. INR must be less than 1.5. If not completed prior to check in for the procedure this will be completed by the procedure staff before the procedure can be complete. 2.  Take all routine medications unless otherwise instructed. Ok to take vitamins and antiinflammatory medications    3. EATING & DRINKING:  YOUR PROCEDURE REQUIRES ANESTHESIA, NO FOOD OR LIQUIDS FOR 8 HOURS PRIOR TO THE PROCEDURE    4. If you are allergic to contrast or iodine, you must take benadryl and prednisone prior to the injection to prevent an allergic reaction. Follow the directions on the prescription for the times to take the medication. 5.  Wear simple loose clothing, which can be easily changed. 6.  Leave jewelry (including rings) and other valuables at home.      7.  Make arrangements for a family member or friend to drive you to the surgery center. Your ride must stay in the hospital while you are having the injection done. If they cannot stay, the injection will be rescheduled. The sedation may affect your judgment following the procedure and driving a vehicle within 24 hours after the sedation could be dangerous. 8.  You will be asked to sign several forms prior to surgery; patients under the age of 25 must have a parent or legal guardian sign the permit to be able to do the procedure. 9.  You must have finished any antibiotic prescribed for recent infections. If required, please take pre-procedure antibiotic or other pre-procedure medications as instructed. 10. Bring inhalers and pain medications with you to your procedure. 11. Bring your MRI/CT films if they were done outside of the [facility]. 12. If you should develop a cold, sore throat, cough, fever or other new indication of illness or infection, or are started on antibiotics within 2 weeks of the scheduled procedure, please notify the [facility] office as early as possible at [phone]. If calling after 4:30pm the day prior to your scheduled procedure please contact [phone] and Leave a Voice Message.

## 2022-09-29 NOTE — PROGRESS NOTES
Subjective:      Patient ID: Khurram Solorio is a 50 y.o. female. Chief Complaint   Patient presents with    Back Pain     Back Pain  Pertinent negatives include no chest pain. Hip Pain      Referred back by Dr. Adam Gould to schedule spinal injections    Pain Assessment  Location of Pain: Back  Location Modifiers: Left, Right, Posterior, Medial, Lateral  Severity of Pain: 4  Quality of Pain: Dull, Aching, Sharp  Duration of Pain: Persistent  Frequency of Pain: Constant  Aggravating Factors:  (certain movements)  Limiting Behavior: Yes  Relieving Factors: Nsaids, Exercise (moving around)  Result of Injury: No  Work-Related Injury: No  Are there other pain locations you wish to document?: No    No Known Allergies      Outpatient Medications Marked as Taking for the 9/29/22 encounter (Office Visit) with ELISSA Villalpando CNP   Medication Sig Dispense Refill    ALPRAZolam [place] [employer]) 0.5 MG tablet take 1 tablet by mouth once daily if needed for anxiety      amitriptyline (ELAVIL) 50 MG tablet take 1 tablet by mouth at bedtime      celecoxib (CELEBREX) 200 MG capsule Celecoxib (Celebrex) 200 mg capsule Active 200 MG PO TWICE A DAY 30 August 27th, 2021 9:39am      omeprazole (PRILOSEC) 40 MG delayed release capsule take 1 capsule by mouth once daily      tiZANidine (ZANAFLEX) 4 MG tablet take 1 tablet by mouth at bedtime      amitriptyline (ELAVIL) 25 MG tablet Indications: per Dr Jennifer Thompson       Cholecalciferol (VITAMIN D) 50 MCG (2000 UT) CAPS capsule Take by mouth daily      Multiple Vitamins-Minerals (THERAPEUTIC MULTIVITAMIN-MINERALS) tablet Take 1 tablet by mouth daily      meloxicam (MOBIC) 15 MG tablet take 1 tablet by mouth once daily 30 tablet 3       Past Medical History:   Diagnosis Date    Anxiety     Chronic low back pain     Disabling. Dyshidrotic eczema     Of a couple of toes. Dysmenorrhea     Endometritis     Ingrown toenail     Painful, infected, left hallux, mycotic.     Menorrhagia Peptic ulcer disease     Right shoulder pain     With consideration to impingement and rotator cuff syndrome. Tobacco abuse     Toenail fungus     Developing, right hallux. Past Surgical History:   Procedure Laterality Date    APPENDECTOMY      CHOLECYSTECTOMY  01/17/1997    DILATION AND CURETTAGE OF UTERUS      ERCP  01/14/1997    With endoscopic retrograde sphincterotomy and common bile duct stone extraction. [facility]. INJECTION PROCEDURE FOR SACROILIAC JOINT Bilateral 1/24/2020    Bilateral SACROILIAC & Piriformis INJ's performed by Gregorio Sagastume MD at [address] [place] [address] Close Bilateral 2/7/2020    Bilateral SACROILIAC & Piriformis INJ's performed by Gregorio Sagastume MD at [address] [place] [address] Bilateral 5/27/2022    Bilateral L5-S1 Diagnostic Medial Branch Block performed by Gregorio Sagastume MD at [address] [place] [address] Bilateral 7/8/2022    Bilateral L5-S1 Confirmatory Medal Branch Block performed by Gregorio Sagastume MD at [facility] Bilateral 5/13/2022    Bilateral L5 TRANSFORAMINAL performed by Gregorio Sagastume MD at [facility] Right 8/22/2022    Right L5-S1 Radiofrequency Ablation performed by Gregorio Sagastume MD at [facility] Left 9/6/2022    Left L5-S1 Radiofrequency Ablation performed by Gregorio Sagastume MD at [address] [place] [address] 0.6-1CM TRUNK,ARM,LEG Right 2/15/2018    Excision Lesion Sebaceous Cyst Right Shoulder performed by Reyes Cuellar MD at [facility]  03/30/1999       Family History   Problem Relation Age of Onset    Depression Mother     Anxiety Disorder Mother     Breast Cancer Mother     Cancer Mother     Heart Disease Father     Cancer Father         Pancreatic cancer.     Diabetes Father     High Blood Pressure Father     Depression Brother     Cancer Maternal Grandmother         cervical    Kidney Disease Maternal Grandmother     Other Paternal Grandmother alzhiemers    Other Son         hydrocephaly    Mental Illness Daughter     Glaucoma Neg Hx     Cataracts Neg Hx        Social History     Socioeconomic History    Marital status: Single     Spouse name: None    Number of children: None    Years of education: None    Highest education level: None   Tobacco Use    Smoking status: Every Day     Packs/day: 0.50     Years: 20.00     Pack years: 10.00     Types: Cigarettes    Smokeless tobacco: Never   Vaping Use    Vaping Use: Never used   Substance and Sexual Activity    Alcohol use: No     Alcohol/week: 0.0 standard drinks    Drug use: No    Sexual activity: Yes     Partners: Male     Review of Systems   Respiratory:  Negative for shortness of breath. Cardiovascular:  Negative for chest pain. Gastrointestinal:  Negative for constipation. Genitourinary:  Negative for difficulty urinating. Musculoskeletal:  Positive for arthralgias, back pain and myalgias. Psychiatric/Behavioral:  Positive for sleep disturbance. Objective:   Physical Exam  Vitals reviewed. Constitutional:       General: She is not in acute distress. Appearance: She is well-developed. She is not ill-appearing, toxic-appearing or diaphoretic. Interventions: She is not intubated. HENT:      Head: Normocephalic and atraumatic. Cardiovascular:      Rate and Rhythm: Normal rate. Pulmonary:      Effort: Pulmonary effort is normal. No tachypnea, bradypnea, accessory muscle usage or respiratory distress. She is not intubated. Skin:     General: Skin is warm and dry. Capillary Refill: Capillary refill takes less than 2 seconds. Coloration: Skin is not pale. Nails: There is no clubbing. Neurological:      Mental Status: She is alert and oriented to person, place, and time. GCS: GCS eye subscore is 4. GCS verbal subscore is 5. GCS motor subscore is 6. Cranial Nerves: No cranial nerve deficit.    Psychiatric:         Speech: Speech normal. Behavior: Behavior normal. Behavior is not agitated, aggressive, withdrawn or combative. Behavior is cooperative. Judgment: Judgment normal. Judgment is not impulsive or inappropriate. Assessment:      1. Bulging of lumbar intervertebral disc    2. Lumbar facet joint syndrome    3. Lumbar radiculopathy    4. Spinal stenosis of lumbar region without neurogenic claudication          Plan:      Dr. Carli Carpenter note reviewed. Will schedule bilateral L4 TFE       Informed consent has not been obtained for procedure. Flash Ward not on blood thinners. Medications to hold have been reviewed with patient. Blood thinners must be held with permission from your cardiologist or primary care physician. A letter is not required to besent to PCP/Cardiologist regarding holding medications for procedure to decrease bleeding risk.      Gideon Silva, ELISSA - CNP

## 2022-09-29 NOTE — TELEPHONE ENCOUNTER
Office note received from Dr. Adam Gould from [facility] scanned into media for your review. Thank you.

## 2022-09-29 NOTE — TELEPHONE ENCOUNTER
Bilateral L4 TFE  with IV  sedation scheduled for 10/21/22 with surgery center notified. .       Patient Educated to have . No blood thinners taken.

## 2022-09-29 NOTE — PROGRESS NOTES
Subjective:      Patient ID: Francisca Merlin is a 50 y.o. female. Chief Complaint   Patient presents with    Back Pain     Back Pain  Pertinent negatives include no chest pain. Hip Pain      Referred back by Dr. Duong Whitmore to schedule spinal injections    Pain Assessment  Location of Pain: Back  Location Modifiers: Left, Right, Posterior, Medial, Lateral  Severity of Pain: 4  Quality of Pain: Dull, Aching, Sharp  Duration of Pain: Persistent  Frequency of Pain: Constant  Aggravating Factors:  (certain movements)  Limiting Behavior: Yes  Relieving Factors: Nsaids, Exercise (moving around)  Result of Injury: No  Work-Related Injury: No  Are there other pain locations you wish to document?: No    No Known Allergies      Outpatient Medications Marked as Taking for the 9/29/22 encounter (Office Visit) with ELISSA Larson CNP   Medication Sig Dispense Refill    ALPRAZolam [place]) 0.5 MG tablet take 1 tablet by mouth once daily if needed for anxiety      amitriptyline (ELAVIL) 50 MG tablet take 1 tablet by mouth at bedtime      celecoxib (CELEBREX) 200 MG capsule Celecoxib (Celebrex) 200 mg capsule Active 200 MG PO TWICE A DAY 30 August 27th, 2021 9:39am      omeprazole (PRILOSEC) 40 MG delayed release capsule take 1 capsule by mouth once daily      tiZANidine (ZANAFLEX) 4 MG tablet take 1 tablet by mouth at bedtime      amitriptyline (ELAVIL) 25 MG tablet Indications: per Dr Corky Lopez       Cholecalciferol (VITAMIN D) 50 MCG (2000 UT) CAPS capsule Take by mouth daily      Multiple Vitamins-Minerals (THERAPEUTIC MULTIVITAMIN-MINERALS) tablet Take 1 tablet by mouth daily      meloxicam (MOBIC) 15 MG tablet take 1 tablet by mouth once daily 30 tablet 3       Past Medical History:   Diagnosis Date    Anxiety     Chronic low back pain     Disabling. Dyshidrotic eczema     Of a couple of toes. Dysmenorrhea     Endometritis     Ingrown toenail     Painful, infected, left hallux, mycotic.     Menorrhagia Peptic ulcer disease     Right shoulder pain     With consideration to impingement and rotator cuff syndrome. Tobacco abuse     Toenail fungus     Developing, right hallux. Past Surgical History:   Procedure Laterality Date    APPENDECTOMY      CHOLECYSTECTOMY  01/17/1997    DILATION AND CURETTAGE OF UTERUS      ERCP  01/14/1997    With endoscopic retrograde sphincterotomy and common bile duct stone extraction. [facility]. INJECTION PROCEDURE FOR SACROILIAC JOINT Bilateral 1/24/2020    Bilateral SACROILIAC & Piriformis INJ's performed by Arlin Alexis MD at [address] Bilateral 2/7/2020    Bilateral SACROILIAC & Piriformis INJ's performed by Arlin Alexis MD at [address] [facility] Bilateral 5/27/2022    Bilateral L5-S1 Diagnostic Medial Branch Block performed by Arlin Alexis MD at [address] [place] [address] Bilateral 7/8/2022    Bilateral L5-S1 Confirmatory Medal Branch Block performed by Arlin Alexis MD at [address] [place] [address] Bilateral 5/13/2022    Bilateral L5 TRANSFORAMINAL performed by Arlin Alexis MD at [address] [place] [address] Right 8/22/2022    Right L5-S1 Radiofrequency Ablation performed by Arlin Alexis MD at [address] [place] [address] Left 9/6/2022    Left L5-S1 Radiofrequency Ablation performed by Arlin Alexis MD at [address] [place] [address] 0.6-1CM TRUNK,ARM,LEG Right 2/15/2018    Excision Lesion Sebaceous Cyst Right Shoulder performed by Bekah Martin MD at [address]  03/30/1999       Family History   Problem Relation Age of Onset    Depression Mother     Anxiety Disorder Mother     Breast Cancer Mother     Cancer Mother     Heart Disease Father     Cancer Father         Pancreatic cancer.     Diabetes Father     High Blood Pressure Father     Depression Brother     Cancer Maternal Grandmother         cervical    Kidney Disease Maternal Grandmother     Other Paternal Grandmother alzhiemers    Other Son         hydrocephaly    Mental Illness Daughter     Glaucoma Neg Hx     Cataracts Neg Hx        Social History     Socioeconomic History    Marital status: Single     Spouse name: None    Number of children: None    Years of education: None    Highest education level: None   Tobacco Use    Smoking status: Every Day     Packs/day: 0.50     Years: 20.00     Pack years: 10.00     Types: Cigarettes    Smokeless tobacco: Never   Vaping Use    Vaping Use: Never used   Substance and Sexual Activity    Alcohol use: No     Alcohol/week: 0.0 standard drinks    Drug use: No    Sexual activity: Yes     Partners: Male     Review of Systems   Respiratory:  Negative for shortness of breath. Cardiovascular:  Negative for chest pain. Gastrointestinal:  Negative for constipation. Genitourinary:  Negative for difficulty urinating. Musculoskeletal:  Positive for arthralgias, back pain and myalgias. Psychiatric/Behavioral:  Positive for sleep disturbance. Objective:   Physical Exam  Vitals reviewed. Constitutional:       General: She is not in acute distress. Appearance: She is well-developed. She is not ill-appearing, toxic-appearing or diaphoretic. Interventions: She is not intubated. HENT:      Head: Normocephalic and atraumatic. Cardiovascular:      Rate and Rhythm: Normal rate. Pulmonary:      Effort: Pulmonary effort is normal. No tachypnea, bradypnea, accessory muscle usage or respiratory distress. She is not intubated. Skin:     General: Skin is warm and dry. Capillary Refill: Capillary refill takes less than 2 seconds. Coloration: Skin is not pale. Nails: There is no clubbing. Neurological:      Mental Status: She is alert and oriented to person, place, and time. GCS: GCS eye subscore is 4. GCS verbal subscore is 5. GCS motor subscore is 6. Cranial Nerves: No cranial nerve deficit.    Psychiatric:         Speech: Speech normal. Behavior: Behavior normal. Behavior is not agitated, aggressive, withdrawn or combative. Behavior is cooperative. Judgment: Judgment normal. Judgment is not impulsive or inappropriate. Assessment:      1. Bulging of lumbar intervertebral disc          Plan:      Dr. Magalis Daley note reviewed.  Will schedule bilateral L5 TFE and then [id number] MBB        Eleuterio Cornea, APRN - CNP

## 2022-10-21 ENCOUNTER — APPOINTMENT (OUTPATIENT)
Dept: GENERAL RADIOLOGY | Age: 52
End: 2022-10-21
Attending: PHYSICAL MEDICINE & REHABILITATION
Payer: COMMERCIAL

## 2022-10-21 ENCOUNTER — HOSPITAL ENCOUNTER (OUTPATIENT)
Age: 52
Setting detail: OUTPATIENT SURGERY
Discharge: HOME | End: 2022-10-21
Attending: PHYSICAL MEDICINE & REHABILITATION | Admitting: PHYSICAL MEDICINE & REHABILITATION
Payer: COMMERCIAL

## 2022-10-21 VITALS
HEIGHT: 69 IN | HEART RATE: 51 BPM | BODY MASS INDEX: 31.55 KG/M2 | TEMPERATURE: 97 F | SYSTOLIC BLOOD PRESSURE: 108 MMHG | WEIGHT: 213 LBS | OXYGEN SATURATION: 98 % | RESPIRATION RATE: 16 BRPM | DIASTOLIC BLOOD PRESSURE: 67 MMHG

## 2022-10-21 PROCEDURE — 99152 MOD SED SAME PHYS/QHP 5/>YRS: CPT | Performed by: PHYSICAL MEDICINE & REHABILITATION

## 2022-10-21 PROCEDURE — 6360000004 HC RX CONTRAST MEDICATION: Performed by: PHYSICAL MEDICINE & REHABILITATION

## 2022-10-21 PROCEDURE — 3600000056 HC PAIN LEVEL 4 BASE: Performed by: PHYSICAL MEDICINE & REHABILITATION

## 2022-10-21 PROCEDURE — A4216 STERILE WATER/SALINE, 10 ML: HCPCS | Performed by: PHYSICAL MEDICINE & REHABILITATION

## 2022-10-21 PROCEDURE — 3600000057 HC PAIN LEVEL 4 ADDL 15 MIN: Performed by: PHYSICAL MEDICINE & REHABILITATION

## 2022-10-21 PROCEDURE — 6360000002 HC RX W HCPCS: Performed by: PHYSICAL MEDICINE & REHABILITATION

## 2022-10-21 PROCEDURE — 7100000011 HC PHASE II RECOVERY - ADDTL 15 MIN: Performed by: PHYSICAL MEDICINE & REHABILITATION

## 2022-10-21 PROCEDURE — 2580000003 HC RX 258: Performed by: PHYSICAL MEDICINE & REHABILITATION

## 2022-10-21 PROCEDURE — 2709999900 HC NON-CHARGEABLE SUPPLY: Performed by: PHYSICAL MEDICINE & REHABILITATION

## 2022-10-21 PROCEDURE — 3209999900 FLUORO FOR SURGICAL PROCEDURES

## 2022-10-21 PROCEDURE — 7100000010 HC PHASE II RECOVERY - FIRST 15 MIN: Performed by: PHYSICAL MEDICINE & REHABILITATION

## 2022-10-21 PROCEDURE — 64483 NJX AA&/STRD TFRM EPI L/S 1: CPT | Performed by: PHYSICAL MEDICINE & REHABILITATION

## 2022-10-21 PROCEDURE — 2500000003 HC RX 250 WO HCPCS: Performed by: PHYSICAL MEDICINE & REHABILITATION

## 2022-10-21 RX ORDER — SODIUM CHLORIDE 0.9 % (FLUSH) 0.9 %
5-40 SYRINGE (ML) INJECTION PRN
Status: DISCONTINUED | OUTPATIENT
Start: 2022-10-21 | End: 2022-10-21 | Stop reason: HOSPADM

## 2022-10-21 RX ORDER — MIDAZOLAM HYDROCHLORIDE 1 MG/ML
INJECTION INTRAMUSCULAR; INTRAVENOUS PRN
Status: DISCONTINUED | OUTPATIENT
Start: 2022-10-21 | End: 2022-10-21 | Stop reason: ALTCHOICE

## 2022-10-21 RX ORDER — SODIUM CHLORIDE, SODIUM LACTATE, POTASSIUM CHLORIDE, CALCIUM CHLORIDE 600; 310; 30; 20 MG/100ML; MG/100ML; MG/100ML; MG/100ML
INJECTION, SOLUTION INTRAVENOUS CONTINUOUS
Status: DISCONTINUED | OUTPATIENT
Start: 2022-10-21 | End: 2022-10-21 | Stop reason: HOSPADM

## 2022-10-21 RX ORDER — DEXAMETHASONE SODIUM PHOSPHATE 10 MG/ML
INJECTION INTRAMUSCULAR; INTRAVENOUS PRN
Status: DISCONTINUED | OUTPATIENT
Start: 2022-10-21 | End: 2022-10-21 | Stop reason: ALTCHOICE

## 2022-10-21 RX ORDER — FENTANYL CITRATE 50 UG/ML
INJECTION, SOLUTION INTRAMUSCULAR; INTRAVENOUS PRN
Status: DISCONTINUED | OUTPATIENT
Start: 2022-10-21 | End: 2022-10-21 | Stop reason: ALTCHOICE

## 2022-10-21 RX ORDER — BUPIVACAINE HYDROCHLORIDE 5 MG/ML
INJECTION, SOLUTION EPIDURAL; INTRACAUDAL PRN
Status: DISCONTINUED | OUTPATIENT
Start: 2022-10-21 | End: 2022-10-21 | Stop reason: ALTCHOICE

## 2022-10-21 RX ORDER — SODIUM CHLORIDE 9 MG/ML
INJECTION INTRAVENOUS PRN
Status: DISCONTINUED | OUTPATIENT
Start: 2022-10-21 | End: 2022-10-21 | Stop reason: ALTCHOICE

## 2022-10-21 RX ORDER — SODIUM CHLORIDE 9 MG/ML
INJECTION, SOLUTION INTRAVENOUS PRN
Status: DISCONTINUED | OUTPATIENT
Start: 2022-10-21 | End: 2022-10-21 | Stop reason: HOSPADM

## 2022-10-21 RX ORDER — SODIUM CHLORIDE 0.9 % (FLUSH) 0.9 %
5-40 SYRINGE (ML) INJECTION EVERY 12 HOURS SCHEDULED
Status: DISCONTINUED | OUTPATIENT
Start: 2022-10-21 | End: 2022-10-21 | Stop reason: HOSPADM

## 2022-10-21 ASSESSMENT — PAIN DESCRIPTION - DESCRIPTORS
DESCRIPTORS: ACHING;PRESSURE
DESCRIPTORS: ACHING;DISCOMFORT

## 2022-10-21 ASSESSMENT — PAIN - FUNCTIONAL ASSESSMENT: PAIN_FUNCTIONAL_ASSESSMENT: 0-10

## 2022-10-21 ASSESSMENT — PAIN DESCRIPTION - PAIN TYPE: TYPE: SURGICAL PAIN

## 2022-10-21 ASSESSMENT — ENCOUNTER SYMPTOMS
CONSTIPATION: 0
SHORTNESS OF BREATH: 0
BACK PAIN: 1

## 2022-10-21 ASSESSMENT — PAIN SCALES - GENERAL
PAINLEVEL_OUTOF10: 1
PAINLEVEL_OUTOF10: 0

## 2022-10-21 ASSESSMENT — PAIN DESCRIPTION - LOCATION: LOCATION: BACK

## 2022-10-21 ASSESSMENT — PAIN DESCRIPTION - ORIENTATION: ORIENTATION: RIGHT;LOWER

## 2022-10-21 ASSESSMENT — PAIN DESCRIPTION - FREQUENCY: FREQUENCY: CONTINUOUS

## 2022-10-21 NOTE — DISCHARGE INSTRUCTIONS
Home Care after Transforaminal Epidural Steroid Injection/Nerve Block    The doctor has done an injection in your neck; upper back; or lower back to       decrease pain and inflammation. This may help diagnose the source of your pain. You may feel sore at the injection site for the next 2-4 days. You may apply ice to the site for 20 minutes on and 20 minutes off to decrease pain and discomfort, if needed, for the first 24 hours. After 24 hours, you may use heat if needed. Remove the band-aid in 24 hours from the injection site. Your pain may subside right away, or it may take a number of days. This is because two medicines were used in the injection. The first, a local anesthetic, will only work for a few hours. The second, a steroid, may not start working for 2-5 days. Some patients have noticed no changes in their pain for up to 2 weeks. There may be a time after the local anesthetic wears off that you feel like you have more pain. This is called pain flare. If this happens  Limit your activities for the first 24 hours to those that you can do without pain. Keep on taking your pain medicine as prescribed. Sometimes, some patients have had facial and neck flushing, anxiety or nervousness, and mood swings with the use of steroids. These symptoms most often occur within the first 24-48 hours and do not require any treatment. They should go away on their own within one week. If you have diabetes, steroids will cause your blood sugar to increase. Make sure your primary doctor is aware of this and that you have orders to treat your blood sugar to keep it within your normal range. You may have some weakness for the next 3-5 hours due to the anesthetic used. Take it easy. No baths or soaking the injection site for 24 hours after the procedure. Taking a shower is okay. You may resume taking your routine medicines after the procedure including pain medicines as prescribed.  Resume any medications held for the procedure (blood thinners, aspirin, anti-inflammatories)       If you do not already have a follow-up appointment, call your referring doctors office to make one to discuss your results within 2-4 weeks after the treatment. Your doctor will have the report within 7-10 days. You will be given a pain log to complete for the next 14 days. Complete this form and make a copy for your own records. Then, mail it back to us or drop it off at the pain management clinic. We will need this information to decide the next step in your treatment plan. Signs of infection  Fever greater than 100.4°F by mouth for 2 readings taken 4 hours apart  Increased redness, swelling around the site  Any drainage from the site    If you have any new symptoms or any signs of infection, please call [phone] during business hours to notify us. You can also notify your primary care physician. After hours, nights and weekends, call [phone].

## 2022-10-21 NOTE — INTERVAL H&P NOTE
I have interviewed and examined the patient and reviewed the recent History and Physical.  There have been no changes to the recent H&P documentation. The surgical consent form has been signed. Last anticoagulant medication use was:na    Premedication taken for contrast allergy? No    Valium taken for oral sedation? No    No outpatient medications have been marked as taking for the 10/21/22 encounter [facility] Encounter). The patient understands the planned operation and its associated risks and benefits and agrees to proceed.         Electronically signed by Tylor Stratton MD on 10/21/2022 at 9:20 AM

## 2022-10-21 NOTE — OP NOTE
TRANSFORAMINAL EPIDURAL STEROID INJECTION    10/21/22  The patient was counseled at length about the risks of tone Covid-19 during their perioperative period and any recovery window from their procedure. The patient was made aware that tone Covid-19  may worsen their prognosis for recovering from their procedure  and lend to a higher morbidity and/or mortality risk. All material risks, benefits, and reasonable alternatives including postponing the procedure were discussed. The patient does wish to proceed with the procedure at this time. Surgeon: Allyn Barrios MD    Pre-operative Diagnosis:   Hospital Problems             Last Modified POA    * (Principal) Lumbar radiculopathy 10/21/2022 Yes    Lumbar spondylosis 10/21/2022 Yes       Post-operative Diagnosis: Same    Assistants: none    This is a 50 y.o. female patient with pain in the Back, left leg, right leg. Previous treatment and examination findings are noted in the H&P. BL4 transforaminal epidural injection has been requested for diagnostic and therapeutic reasons. Conservative treatment was ineffective i.e.: ice, NSAIDS, rest, narcotic medication, chiropractic care, physical therapy and message therapy. Patient is unable to perform the following ADL's: personal cares, ambulating, and grooming     Pain Assessment: 0-10  Pain Level: 3     Pain Orientation: Left, Lower, Right  Pain Location: Back  Pain Descriptors: Aching, Discomfort    Last Plain films: 2020      EXAMINATION:  BL4 transforaminal radiculogram/epidurogram.   BL4 transforaminal epidural anesthetic injection. BL4 transforaminal epidural steroid injection. CONSENT: Written consent was obtained from the patient on preprinted consent form after explaining the procedure, indications, potential complications and outcomes. Alternative treatments were also discussed. DISCUSSION: The patient was sterilely prepped and draped in the usual fashion in the prone position. Time out was verified for correct patient, side, level and procedure. SEDATION: 0.5mg of Versed and 50mcg of fentanyl and 0 mg propofol IV were given IV pre procedurally and during the procedure. The patient remained fairly sedated throughout the procedure and underwent constant continuous EKG, pulse oximetry and blood pressure monitoring independently by the CRNA/RN as well as by myself. Sedation time was 12 minutes. PROCEDURE:  Under image-intensifier control, a 22 gauge needle x 5 inch spinal needle was guided successfully into the epidural space employing a posterior lateral/oblique approach. Needle aspiration was negative for heme or CSF. Instillation of  .5 mL of Isovue M contrast medium opacified the spinal nerve and demonstrated contiguous flow into the epidural space. No vascular spread was noted. Digital subtraction was not employed to evaluate for vascular spread. The patient was monitored for any untoward reaction to contrast medium before proceeding with procedure #2. The patient did not report pain reproduction in a concordant distribution. Following needle position verification, a test dose of .5 mL of sterile lidocaine 0.5% was administered and patient monitored for any adverse effects. Then, 1 mL of  was instilled into the epidural space and the patient's response was again monitored. Finally, Dexamethasone (Decadron 10 mg/mL)  1 ml of 0.5% bupivacaine was then instilled. The patient's response was again monitored. The spinal needle was removed. The patient tolerated the procedure well and without complications and was noted to be in stable condition prior to discharge from the procedure center with discharge instructions. EBL: no blood loss    SPECIMEN: none    IMPRESSIONS:  BL4 transforaminal epidurogram, epidural anesthesia and epidural steroid injection procedures accomplished without incident.       RECOMMENDATIONS:  Complete and return Post-Procedure Pain and Activity Diary.  Contact the [address] for symptom exacerbation, fever or unusual symptoms. Post-procedure care according to verbal and written discharge instructions    POST-PROCEDURE EPIDUROGRAPHY INTERPRETATION:    EXAMINATION: AP, lateral, and oblique views    FLUORO TIME: 13 seconds    DISCUSSION: Spot views of the spine reveal normal alignment and segmentation. Spinal needle is positioned at the BL4 neuroforamin. Contrast spreads and outlines the BL4 nerve/ neuroforamin and epidural space. The epidurogram reveals excellent contrast flow. Visualized spine reveals See radiology report. Soft tissues reveal no abnormalities. IMPRESSION: BL4 transforaminal epidurogram/epineurogram reveals satisfactory needle position and contrast spread.      Electronically signed by Thomas Kitchen MD on 10/21/2022 at 9:21 AM

## 2022-10-21 NOTE — H&P
Subjective:      Patient ID: Brad Blackmon is a 50 y.o. female. No chief complaint on file. Back Pain  Pertinent negatives include no chest pain. Hip Pain      Referred back by Dr. Carlos Herrera to schedule spinal injections         No Known Allergies      No outpatient medications have been marked as taking for the 10/21/22 encounter [facility] Encounter). Past Medical History:   Diagnosis Date    Anxiety     Chronic low back pain     Disabling. Dyshidrotic eczema     Of a couple of toes. Dysmenorrhea     Endometritis     Ingrown toenail     Painful, infected, left hallux, mycotic. Menorrhagia     Peptic ulcer disease     Right shoulder pain     With consideration to impingement and rotator cuff syndrome. Tobacco abuse     Toenail fungus     Developing, right hallux. Past Surgical History:   Procedure Laterality Date    APPENDECTOMY      CHOLECYSTECTOMY  01/17/1997    DILATION AND CURETTAGE OF UTERUS      ERCP  01/14/1997    With endoscopic retrograde sphincterotomy and common bile duct stone extraction.     [facility]. INJECTION PROCEDURE FOR SACROILIAC JOINT Bilateral 1/24/2020    Bilateral SACROILIAC & Piriformis INJ's [facility] [address] [facility] [address] Bilateral 2/7/2020    Bilateral SACROILIAC & Piriformis INJ's performed by Annette Zacarias MD at [address] [facility] Bilateral 5/27/2022    Bilateral L5-S1 Diagnostic Medial Branch Block performed by Annette Zacarias MD at [address] [facility] Bilateral 7/8/2022    Bilateral L5-S1 Confirmatory Medal Branch Block performed by Annette Zacarias MD at [address] Bilateral 5/13/2022    Bilateral L5 TRANSFORAMINAL performed by Annette Zacarias MD at [address] Right 8/22/2022    Right L5-S1 Radiofrequency Ablation performed by Annette Zacarias MD at [address] Left 9/6/2022    Left L5-S1 Radiofrequency Ablation performed by Brenden Lau MD at [address] [place] [address] 0.6-1CM TRUNK,ARM,LEG Right 2/15/2018    Excision Lesion Sebaceous Cyst Right Shoulder performed by Jessica Blackwell MD at [facility]  03/30/1999       Family History   Problem Relation Age of Onset    Depression Mother     Anxiety Disorder Mother     Breast Cancer Mother     Cancer Mother     Heart Disease Father     Cancer Father         Pancreatic cancer. Diabetes Father     High Blood Pressure Father     Depression Brother     Cancer Maternal Grandmother         cervical    Kidney Disease Maternal Grandmother     Other Paternal Grandmother         alzhiemers    Other Son         hydrocephaly    Mental Illness Daughter     Glaucoma Neg Hx     Cataracts Neg Hx        Social History     Socioeconomic History    Marital status: Single     Spouse name: None    Number of children: None    Years of education: None    Highest education level: None   Tobacco Use    Smoking status: Every Day     Packs/day: 0.50     Years: 20.00     Pack years: 10.00     Types: Cigarettes    Smokeless tobacco: Never   Vaping Use    Vaping Use: Never used   Substance and Sexual Activity    Alcohol use: No     Alcohol/week: 0.0 standard drinks    Drug use: No    Sexual activity: Yes     Partners: Male     Review of Systems   Respiratory:  Negative for shortness of breath. Cardiovascular:  Negative for chest pain. Gastrointestinal:  Negative for constipation. Genitourinary:  Negative for difficulty urinating. Musculoskeletal:  Positive for arthralgias, back pain and myalgias. Psychiatric/Behavioral:  Positive for sleep disturbance. Objective:   Physical Exam  Vitals reviewed. Constitutional:       General: She is not in acute distress. Appearance: She is well-developed. She is not ill-appearing, toxic-appearing or diaphoretic. Interventions: She is not intubated. HENT:      Head: Normocephalic and atraumatic.    Cardiovascular: Rate and Rhythm: Normal rate. Pulmonary:      Effort: Pulmonary effort is normal. No tachypnea, bradypnea, accessory muscle usage or respiratory distress. She is not intubated. Skin:     General: Skin is warm and dry. Capillary Refill: Capillary refill takes less than 2 seconds. Coloration: Skin is not pale. Nails: There is no clubbing. Neurological:      Mental Status: She is alert and oriented to person, place, and time. GCS: GCS eye subscore is 4. GCS verbal subscore is 5. GCS motor subscore is 6. Cranial Nerves: No cranial nerve deficit. Psychiatric:         Speech: Speech normal.         Behavior: Behavior normal. Behavior is not agitated, aggressive, withdrawn or combative. Behavior is cooperative. Judgment: Judgment normal. Judgment is not impulsive or inappropriate. Assessment:      No diagnosis found. Plan:      Dr. Alexus Garcia note reviewed.  Will schedule bilateral L5 TFE and then [id number] RUTH ANN Tee MD

## 2022-11-08 ENCOUNTER — OFFICE VISIT (OUTPATIENT)
Dept: PAIN MANAGEMENT | Age: 52
End: 2022-11-08
Payer: COMMERCIAL

## 2022-11-08 VITALS
DIASTOLIC BLOOD PRESSURE: 82 MMHG | OXYGEN SATURATION: 99 % | BODY MASS INDEX: 31.7 KG/M2 | SYSTOLIC BLOOD PRESSURE: 122 MMHG | WEIGHT: 214 LBS | HEART RATE: 76 BPM | HEIGHT: 69 IN

## 2022-11-08 DIAGNOSIS — M54.16 LUMBAR RADICULOPATHY: ICD-10-CM

## 2022-11-08 DIAGNOSIS — M51.36 BULGING OF LUMBAR INTERVERTEBRAL DISC: Primary | ICD-10-CM

## 2022-11-08 DIAGNOSIS — M47.816 LUMBAR FACET JOINT SYNDROME: ICD-10-CM

## 2022-11-08 PROCEDURE — 99214 OFFICE O/P EST MOD 30 MIN: CPT | Performed by: NURSE PRACTITIONER

## 2022-11-08 ASSESSMENT — ENCOUNTER SYMPTOMS
BACK PAIN: 1
SHORTNESS OF BREATH: 0
CONSTIPATION: 0

## 2022-11-08 NOTE — PROGRESS NOTES
Subjective:      Patient ID: Freya Levy is a 50 y.o. female. Chief Complaint   Patient presents with    Post-Op Check     Feeling better since procedure     Back Pain  Pertinent negatives include no chest pain. Hip Pain      Underwent bilateral L4 TFE with good relief, feels like this is the best injection she has had so far. Pain Assessment  Location of Pain: Back  Severity of Pain: 3  Quality of Pain: Dull, Sharp (numbing)  Duration of Pain: Persistent  Frequency of Pain: Constant  Aggravating Factors: Stairs, Walking, Standing, Squatting, Kneeling, Straightening, Exercise, Stretching, Bending  Limiting Behavior: Yes  Relieving Factors: Heat (meds)  Result of Injury: No  Work-Related Injury: No    No Known Allergies      Outpatient Medications Marked as Taking for the 11/8/22 encounter (Office Visit) with Severo Sieving, APRN - CNP   Medication Sig Dispense Refill    ALPRAZolam (XANAX) 0.5 MG tablet take 1 tablet by mouth once daily if needed for anxiety      amitriptyline (ELAVIL) 50 MG tablet take 1 tablet by mouth at bedtime      omeprazole (PRILOSEC) 40 MG delayed release capsule take 1 capsule by mouth once daily      tiZANidine (ZANAFLEX) 4 MG tablet take 1 tablet by mouth at bedtime      Cholecalciferol (VITAMIN D) 50 MCG (2000 UT) CAPS capsule Take by mouth daily      meloxicam (MOBIC) 15 MG tablet take 1 tablet by mouth once daily 30 tablet 3       Past Medical History:   Diagnosis Date    Anxiety     Chronic low back pain     Disabling. Dyshidrotic eczema     Of a couple of toes. Dysmenorrhea     Endometritis     Ingrown toenail     Painful, infected, left hallux, mycotic. Menorrhagia     Peptic ulcer disease     Right shoulder pain     With consideration to impingement and rotator cuff syndrome. Tobacco abuse     Toenail fungus     Developing, right hallux.        Past Surgical History:   Procedure Laterality Date    APPENDECTOMY      CHOLECYSTECTOMY  01/17/1997    DILATION AND CURETTAGE OF UTERUS      ERCP  01/14/1997    With endoscopic retrograde sphincterotomy and common bile duct stone extraction. [facility] [place]. INJECTION PROCEDURE FOR SACROILIAC JOINT Bilateral 1/24/2020    Bilateral SACROILIAC & Piriformis INJ's performed by Helio Espana MD at [address] Bilateral 2/7/2020    Bilateral SACROILIAC & Piriformis INJ's performed by Helio Espana MD at [address] [facility] Bilateral 5/27/2022    Bilateral L5-S1 Diagnostic Medial Branch Block performed by Helio Espana MD at [address] [place] [address] Bilateral 7/8/2022    Bilateral L5-S1 Confirmatory Medal Branch Block performed by Helio Espana MD at [address] [place] [address] Bilateral 5/13/2022    Bilateral L5 TRANSFORAMINAL performed by Helio Espana MD at [address] [place] [address] Right 8/22/2022    Right L5-S1 Radiofrequency Ablation performed by Helio Espana MD at [address] [place] [address] Left 9/6/2022    Left L5-S1 Radiofrequency Ablation performed by Helio Espana MD at [address] [place] [address] Bilateral 10/21/2022    Bilateral L4 TRANSFORAMINAL performed by Helio Espana MD at [address] [place] [address] 0.6-1CM TRUNK,ARM,LEG Right 2/15/2018    Excision Lesion Sebaceous Cyst Right Shoulder performed by Ki Belcher MD at [facility]  03/30/1999       Family History   Problem Relation Age of Onset    Depression Mother     Anxiety Disorder Mother     Breast Cancer Mother     Cancer Mother     Heart Disease Father     Cancer Father         Pancreatic cancer.     Diabetes Father     High Blood Pressure Father     Depression Brother     Cancer Maternal Grandmother         cervical    Kidney Disease Maternal Grandmother     Other Paternal Grandmother         alzhiemers    Other Son         hydrocephaly    Mental Illness Daughter     Glaucoma Neg Hx     Cataracts Neg Hx        Social History     Socioeconomic History Marital status: Single     Spouse name: None    Number of children: None    Years of education: None    Highest education level: None   Tobacco Use    Smoking status: Every Day     Packs/day: 0.50     Years: 20.00     Pack years: 10.00     Types: Cigarettes    Smokeless tobacco: Never   Vaping Use    Vaping Use: Never used   Substance and Sexual Activity    Alcohol use: No     Alcohol/week: 0.0 standard drinks    Drug use: No    Sexual activity: Yes     Partners: Male     Review of Systems   Respiratory:  Negative for shortness of breath. Cardiovascular:  Negative for chest pain. Gastrointestinal:  Negative for constipation. Genitourinary:  Negative for difficulty urinating. Musculoskeletal:  Positive for arthralgias, back pain and myalgias. Psychiatric/Behavioral:  Positive for sleep disturbance. Objective:   Physical Exam  Vitals reviewed. Constitutional:       General: She is not in acute distress. Appearance: She is well-developed. She is not ill-appearing, toxic-appearing or diaphoretic. Interventions: She is not intubated. HENT:      Head: Normocephalic and atraumatic. Cardiovascular:      Rate and Rhythm: Normal rate. Pulmonary:      Effort: Pulmonary effort is normal. No tachypnea, bradypnea, accessory muscle usage or respiratory distress. She is not intubated. Skin:     General: Skin is warm and dry. Capillary Refill: Capillary refill takes less than 2 seconds. Coloration: Skin is not pale. Nails: There is no clubbing. Neurological:      Mental Status: She is alert and oriented to person, place, and time. GCS: GCS eye subscore is 4. GCS verbal subscore is 5. GCS motor subscore is 6. Cranial Nerves: No cranial nerve deficit. Psychiatric:         Speech: Speech normal.         Behavior: Behavior normal. Behavior is not agitated, aggressive, withdrawn or combative. Behavior is cooperative.          Judgment: Judgment normal. Judgment is not impulsive or inappropriate. Assessment:      1. Bulging of lumbar intervertebral disc    2. Lumbar radiculopathy    3.  Lumbar facet joint syndrome          Plan:      Consider repeating bilateral L4 TFE, she will reach out to Dr. Daniel Jessica for his opinion or further recommendations        Nette Askew, APRN - CNP

## (undated) DEVICE — NEEDLE, QUINCKE, 22GX5": Brand: MEDLINE

## (undated) DEVICE — 3M™ TEGADERM™ TRANSPARENT FILM DRESSING FRAME STYLE, 1624W, 2-3/8 IN X 2-3/4 IN (6 CM X 7 CM), 100/CT 4CT/CASE: Brand: 3M™ TEGADERM™

## (undated) DEVICE — TIDISHIELD BAND BAGS CLEAR POLYETHYLENE STERILE 20IN X 20IN 100 PER CASE: Brand: TIDISHIELD

## (undated) DEVICE — PAD ELECSURG GRND DISP

## (undated) DEVICE — NEEDLE FLTR 18GA L1.5IN MEM THK5UM BLNT DISP

## (undated) DEVICE — GLOVE SURG SZ 8 L12IN THK91MIL BRN LTX FREE POLYCHLOROPRENE

## (undated) DEVICE — SOLUTION IV IRRIG POUR BRL 0.9% SODIUM CHL 2F7124

## (undated) DEVICE — PACK SET UP

## (undated) DEVICE — GLOVE ORANGE PI 8   MSG9080

## (undated) DEVICE — Z DISCONTINUED USE 2624852 GLOVE SURG 7 PF TEXT NEOPRNE BRN STRL NEOLON 2G LF

## (undated) DEVICE — YANKAUER,BULB TIP,W/O VENT,RIGID,STERILE: Brand: MEDLINE

## (undated) DEVICE — GOWN,AURORA,NONREINFORCED,LARGE: Brand: MEDLINE

## (undated) DEVICE — BANDAGE ADH DIA7/8IN NAT FLEX-FABRIC WVN FOR WND PROTCT

## (undated) DEVICE — CHLORAPREP 26ML CLEAR

## (undated) DEVICE — SUTURE NONABSORBABLE MONOFILAMENT 4-0 PS-2 18 IN BLK ETHILON 1667G

## (undated) DEVICE — TRAY PAIN CUST

## (undated) DEVICE — SHEET, T, LAPAROTOMY, STERILE: Brand: MEDLINE

## (undated) DEVICE — LINE SAMP O2 6.5FT W/FEMALE CONN F/ADULT CAPNOLINE PLUS

## (undated) DEVICE — BASIN SET: Brand: MEDLINE INDUSTRIES, INC.

## (undated) DEVICE — Z DISCONTINUED USE 2273271 SOLUTION PREP 4OZ 10% POVIDONE IOD BTL FLIP TOP CAP

## (undated) DEVICE — COVER,TABLE,77X90,STERILE: Brand: MEDLINE

## (undated) DEVICE — GOWN,AURORA,NONRNF,XL,30/CS: Brand: MEDLINE

## (undated) DEVICE — PACK SURG PROC REMINDER N WVN DISPOSABLE BEAC TIME OUT

## (undated) DEVICE — C-ARMOR C-ARM EQUIPMENT COVERS CLEAR STERILE UNIVERSAL FIT 12 PER CASE: Brand: C-ARMOR

## (undated) DEVICE — INTENDED FOR TISSUE SEPARATION, AND OTHER PROCEDURES THAT REQUIRE A SHARP SURGICAL BLADE TO PUNCTURE OR CUT.: Brand: BARD-PARKER ® CARBON RIB-BACK BLADES

## (undated) DEVICE — 1200CC GUARDIAN II: Brand: GUARDIAN

## (undated) DEVICE — GLOVE ORANGE PI 7   MSG9070

## (undated) DEVICE — CANNULA 15CM, 5MM TIP, 18G: Brand: CANNULARFK

## (undated) DEVICE — SYRINGE ANGIO 3ML W/ CLR POLYCARB BRL YEL PLUNG BLK MED

## (undated) DEVICE — TOWEL,OR,DSP,ST,BLUE,STD,4/PK,20PK/CS: Brand: MEDLINE

## (undated) DEVICE — COVER LT HNDL BLU PLAS

## (undated) DEVICE — 9165 UNIVERSAL PATIENT PLATE: Brand: 3M™

## (undated) DEVICE — Z DISCONTINUED BY MEDLINE USE 2711682 TRAY SKIN PREP DRY W/ PREM GLV

## (undated) DEVICE — GAUZE,SPONGE,2"X2",8PLY,STERILE,LF,2'S: Brand: MEDLINE

## (undated) DEVICE — SOLUTION SCRB 4OZ 7.5% POVIDONE IOD FLIP TOP CAP

## (undated) DEVICE — TUBING, SUCTION, 3/16" X 20', STRAIGHT: Brand: MEDLINE

## (undated) DEVICE — GLOVE ORANGE PI 7 1/2   MSG9075

## (undated) DEVICE — MARKER W/PRE PRINTED CUSTOM LABEL

## (undated) DEVICE — SUTURE ETHLN SZ 3-0 L18IN NONABSORBABLE BLK FS-1 L24MM 3/8 663H

## (undated) DEVICE — SUTURE VCRL SZ 3-0 L18IN ABSRB UD PS-2 L19MM 3/8 CRV PRIM J497H